# Patient Record
Sex: FEMALE | Race: WHITE | NOT HISPANIC OR LATINO | Employment: FULL TIME | URBAN - METROPOLITAN AREA
[De-identification: names, ages, dates, MRNs, and addresses within clinical notes are randomized per-mention and may not be internally consistent; named-entity substitution may affect disease eponyms.]

---

## 2017-03-07 ENCOUNTER — OFFICE VISIT (OUTPATIENT)
Dept: INTERNAL MEDICINE | Age: 57
End: 2017-03-07

## 2017-03-07 VITALS
BODY MASS INDEX: 30.66 KG/M2 | TEMPERATURE: 97.4 F | SYSTOLIC BLOOD PRESSURE: 130 MMHG | RESPIRATION RATE: 12 BRPM | HEIGHT: 65 IN | DIASTOLIC BLOOD PRESSURE: 80 MMHG | WEIGHT: 184 LBS | HEART RATE: 72 BPM

## 2017-03-07 DIAGNOSIS — K52.9 GASTROENTERITIS, ACUTE: Primary | ICD-10-CM

## 2017-03-07 PROCEDURE — 99214 OFFICE O/P EST MOD 30 MIN: CPT | Performed by: INTERNAL MEDICINE

## 2017-03-07 RX ORDER — PROMETHAZINE HYDROCHLORIDE 12.5 MG/1
TABLET ORAL
Qty: 20 TABLET | Refills: 0 | Status: SHIPPED | OUTPATIENT
Start: 2017-03-07 | End: 2018-07-03 | Stop reason: SDUPTHER

## 2017-03-07 NOTE — PROGRESS NOTES
Carlie Brennan is a 56 y.o. female who presents with   Chief Complaint   Patient presents with   • Nausea     Nausea/vomiting off and on for the past week.  No fever or chills.  No diarrhea.   • Vomiting     History as above.   .    Nausea   This is a new problem. The current episode started in the past 7 days. The problem occurs intermittently. The problem has been unchanged. Associated symptoms include fatigue, nausea, vomiting and weakness. Pertinent negatives include no abdominal pain, anorexia, change in bowel habit, chills, congestion, coughing, fever or urinary symptoms. She has tried nothing for the symptoms.   Vomiting    This is a new problem. The current episode started in the past 7 days. The problem occurs intermittently. The problem has been unchanged. Pertinent negatives include no abdominal pain, chills, coughing or fever. She has tried nothing for the symptoms.        The following portions of the patient's history were reviewed and updated as appropriate: allergies, current medications, past medical history and problem list.    Review of Systems   Constitutional: Positive for fatigue. Negative for chills and fever.   HENT: Negative.  Negative for congestion.    Eyes: Negative.    Respiratory: Negative.  Negative for cough.    Cardiovascular: Negative.    Gastrointestinal: Positive for nausea and vomiting. Negative for abdominal pain, anorexia and change in bowel habit.   Genitourinary: Negative.    Musculoskeletal: Negative.    Skin: Negative.    Neurological: Positive for weakness.   Psychiatric/Behavioral: Negative.        Objective   Physical Exam   Constitutional: She is oriented to person, place, and time. She appears well-developed and well-nourished. No distress.   HENT:   Head: Normocephalic and atraumatic.   Eyes: Conjunctivae and EOM are normal. Pupils are equal, round, and reactive to light.   Neck: Normal range of motion. Neck supple. No thyromegaly present.   Neck exam negative.   Carotid auscultation normal-no bruits heard.   Cardiovascular: Normal rate, regular rhythm, normal heart sounds and intact distal pulses.  Exam reveals no gallop and no friction rub.    No murmur heard.  Pulmonary/Chest: Effort normal and breath sounds normal. No respiratory distress. She has no wheezes. She has no rales. She exhibits no tenderness.   Abdominal: Soft. Bowel sounds are normal. She exhibits no distension and no mass. There is no tenderness. There is no rebound and no guarding.   Neurological: She is alert and oriented to person, place, and time.   Psychiatric: She has a normal mood and affect. Her behavior is normal. Judgment and thought content normal.   Nursing note and vitals reviewed.      Assessment/Plan   Carlie was seen today for nausea and vomiting.    Diagnoses and all orders for this visit:    Gastroenteritis, acute  -     promethazine (PHENERGAN) 12.5 MG tablet; Take one tablet every 4 to 6 hours as needed for nausea or vomiting.        Plan: Clear liquids to tolerance.  Phenergan tablets as above.  Follow-up for this issue as needed.  Patient was given a note to remain off for the rest of today.

## 2017-06-14 ENCOUNTER — OFFICE VISIT (OUTPATIENT)
Dept: INTERNAL MEDICINE | Age: 57
End: 2017-06-14

## 2017-06-14 VITALS
BODY MASS INDEX: 28.59 KG/M2 | RESPIRATION RATE: 12 BRPM | SYSTOLIC BLOOD PRESSURE: 120 MMHG | DIASTOLIC BLOOD PRESSURE: 70 MMHG | WEIGHT: 171.6 LBS | HEART RATE: 70 BPM | HEIGHT: 65 IN | OXYGEN SATURATION: 98 %

## 2017-06-14 DIAGNOSIS — I10 ESSENTIAL HYPERTENSION: Primary | ICD-10-CM

## 2017-06-14 DIAGNOSIS — E78.2 MIXED HYPERLIPIDEMIA: ICD-10-CM

## 2017-06-14 LAB
ALBUMIN SERPL-MCNC: 4.9 G/DL (ref 3.5–5.2)
ALBUMIN/GLOB SERPL: 1.9 G/DL
ALP SERPL-CCNC: 76 U/L (ref 39–117)
ALT SERPL-CCNC: 19 U/L (ref 1–33)
AST SERPL-CCNC: 24 U/L (ref 1–32)
BILIRUB SERPL-MCNC: 0.4 MG/DL (ref 0.1–1.2)
BUN SERPL-MCNC: 12 MG/DL (ref 6–20)
BUN/CREAT SERPL: 10.8 (ref 7–25)
CALCIUM SERPL-MCNC: 10 MG/DL (ref 8.6–10.5)
CHLORIDE SERPL-SCNC: 99 MMOL/L (ref 98–107)
CHOLEST SERPL-MCNC: 200 MG/DL (ref 0–200)
CO2 SERPL-SCNC: 26.7 MMOL/L (ref 22–29)
CREAT SERPL-MCNC: 1.11 MG/DL (ref 0.57–1)
GLOBULIN SER CALC-MCNC: 2.6 GM/DL
GLUCOSE SERPL-MCNC: 93 MG/DL (ref 65–99)
HDLC SERPL-MCNC: 43 MG/DL (ref 40–60)
LDLC SERPL CALC-MCNC: 128 MG/DL (ref 0–100)
POTASSIUM SERPL-SCNC: 5.1 MMOL/L (ref 3.5–5.2)
PROT SERPL-MCNC: 7.5 G/DL (ref 6–8.5)
SODIUM SERPL-SCNC: 142 MMOL/L (ref 136–145)
TRIGL SERPL-MCNC: 144 MG/DL (ref 0–150)
VLDLC SERPL CALC-MCNC: 28.8 MG/DL (ref 5–40)

## 2017-06-14 PROCEDURE — 99214 OFFICE O/P EST MOD 30 MIN: CPT | Performed by: INTERNAL MEDICINE

## 2017-06-14 NOTE — PROGRESS NOTES
Carlie Brennan is a 56 y.o. female who presents with   Chief Complaint   Patient presents with   • Hypertension     Yearly checkup; lab updates   • Hyperlipidemia     As above.   .    Hypertension   This is a chronic problem. The current episode started more than 1 year ago. The problem is controlled. Treatments tried: Currently taking spironolactone as directed. The current treatment provides moderate improvement. There are no compliance problems.    Hyperlipidemia   This is a chronic problem. The current episode started more than 1 year ago. The problem is controlled. Recent lipid tests were reviewed and are normal. She is currently on no antihyperlipidemic treatment.        The following portions of the patient's history were reviewed and updated as appropriate: allergies, current medications, past medical history and problem list.    Review of Systems   Constitutional: Negative.    HENT: Negative.    Eyes: Negative.    Respiratory: Negative.    Cardiovascular: Negative.    Genitourinary: Negative.    Musculoskeletal: Negative.    Skin: Negative.    Neurological: Negative.    Psychiatric/Behavioral: Negative.        Objective   Physical Exam   Constitutional: She is oriented to person, place, and time. She appears well-developed and well-nourished. No distress.   HENT:   Head: Normocephalic and atraumatic.   Eyes: Conjunctivae and EOM are normal. Pupils are equal, round, and reactive to light.   Neck: Normal range of motion. Neck supple. No thyromegaly present.   Neck exam negative.  Carotid auscultation normal-no bruits heard.   Cardiovascular: Normal rate, regular rhythm, normal heart sounds and intact distal pulses.  Exam reveals no gallop and no friction rub.    No murmur heard.  Pulmonary/Chest: Effort normal and breath sounds normal. No respiratory distress. She has no wheezes. She has no rales. She exhibits no tenderness.   Neurological: She is alert and oriented to person, place, and time.   Psychiatric:  She has a normal mood and affect. Her behavior is normal. Judgment and thought content normal.   Nursing note and vitals reviewed.      Assessment/Plan   Carlie was seen today for hypertension and hyperlipidemia.    Diagnoses and all orders for this visit:    Essential hypertension  -     Comprehensive Metabolic Panel    Mixed hyperlipidemia  -     Comprehensive Metabolic Panel  -     Lipid Panel      Plan: Labs as above.Today's exam unremarkable for any new problems or events.  Continue all current treatment as prescribed.  A follow-up checkup/lab update visit is advised for one year.

## 2017-07-25 ENCOUNTER — TELEPHONE (OUTPATIENT)
Dept: INTERNAL MEDICINE | Age: 57
End: 2017-07-25

## 2017-07-25 DIAGNOSIS — Z00.00 HEALTHCARE MAINTENANCE: Primary | ICD-10-CM

## 2017-07-25 NOTE — TELEPHONE ENCOUNTER
P/pt it has been discussed in the past with  about getting a referral for a colonoscopy.    Pt is requesting a referral for a colonoscopy.    Pt can be reached #[phone].

## 2017-09-21 ENCOUNTER — TELEPHONE (OUTPATIENT)
Dept: INTERNAL MEDICINE | Age: 57
End: 2017-09-21

## 2017-09-26 ENCOUNTER — APPOINTMENT (OUTPATIENT)
Dept: WOMENS IMAGING | Facility: HOSPITAL | Age: 57
End: 2017-09-26

## 2017-09-26 PROCEDURE — 77063 BREAST TOMOSYNTHESIS BI: CPT | Performed by: RADIOLOGY

## 2017-09-26 PROCEDURE — 77067 SCR MAMMO BI INCL CAD: CPT | Performed by: RADIOLOGY

## 2018-02-02 ENCOUNTER — OFFICE VISIT (OUTPATIENT)
Dept: INTERNAL MEDICINE | Age: 58
End: 2018-02-02

## 2018-02-02 VITALS
BODY MASS INDEX: 26.66 KG/M2 | OXYGEN SATURATION: 98 % | HEIGHT: 65 IN | RESPIRATION RATE: 12 BRPM | DIASTOLIC BLOOD PRESSURE: 60 MMHG | SYSTOLIC BLOOD PRESSURE: 120 MMHG | WEIGHT: 160 LBS | TEMPERATURE: 97.3 F | HEART RATE: 80 BPM

## 2018-02-02 DIAGNOSIS — H81.13 BENIGN PAROXYSMAL POSITIONAL VERTIGO DUE TO BILATERAL VESTIBULAR DISORDER: Primary | ICD-10-CM

## 2018-02-02 DIAGNOSIS — I10 ESSENTIAL HYPERTENSION: ICD-10-CM

## 2018-02-02 PROCEDURE — 99214 OFFICE O/P EST MOD 30 MIN: CPT | Performed by: INTERNAL MEDICINE

## 2018-02-02 RX ORDER — SPIRONOLACTONE 50 MG/1
TABLET, FILM COATED ORAL
Refills: 0 | COMMUNITY
Start: 2018-01-30 | End: 2018-07-03 | Stop reason: SDUPTHER

## 2018-02-02 NOTE — PROGRESS NOTES
"  Carlie Brennan is a 57 y.o. female who presents with   Chief Complaint   Patient presents with   • Dizziness     started this morning.  Patient states that she is having problems with more and more dizziness.  In the past she used to have it \"a couple of times a year\" and now she is having it \"every few months\".  It is characterized by sudden onset of dizziness with exacerbation by positional change.  She said the more she is up the worse it gets and it can be associated with nausea.  There are no symptoms of paresthesias or cardiac irregularities.   .    Dizziness   This is a recurrent problem. The current episode started more than 1 year ago. The problem occurs constantly. The problem has been waxing and waning. Associated symptoms include nausea, vertigo and vomiting. Pertinent negatives include no chills, congestion, coughing, diaphoresis, fever, headaches, numbness, rash, sore throat, visual change or weakness. Treatments tried: Meclizine; Antivert; Dramamine. Improvement on treatment: She does get mild relief with these medications but nothing that seems to make the problem go away.        The following portions of the patient's history were reviewed and updated as appropriate: allergies, current medications, past medical history and problem list.    Review of Systems   Constitutional: Negative for chills, diaphoresis and fever.   HENT: Negative for congestion and sore throat.    Respiratory: Negative for cough.    Gastrointestinal: Positive for nausea and vomiting.   Skin: Negative for rash.   Neurological: Positive for dizziness and vertigo. Negative for weakness, numbness and headaches.       Objective   Physical Exam   Constitutional: She is oriented to person, place, and time. She appears well-developed and well-nourished. No distress.   HENT:   Head: Normocephalic and atraumatic.   Right Ear: External ear normal.   Left Ear: External ear normal.   Nose: Nose normal.   Mouth/Throat: Oropharynx is clear " and moist. No oropharyngeal exudate.   Eyes: Conjunctivae and EOM are normal. Pupils are equal, round, and reactive to light.   Neck: Normal range of motion. Neck supple. No thyromegaly present.   Neck exam negative.  Carotid auscultation normal-no bruits heard.   Cardiovascular: Normal rate, regular rhythm, normal heart sounds and intact distal pulses.  Exam reveals no gallop and no friction rub.    No murmur heard.  Pulmonary/Chest: Effort normal and breath sounds normal. No respiratory distress. She has no wheezes. She has no rales. She exhibits no tenderness.   Neurological: She is alert and oriented to person, place, and time.   Psychiatric: She has a normal mood and affect. Her behavior is normal. Judgment and thought content normal.   Nursing note and vitals reviewed.      Assessment/Plan   Carlie was seen today for dizziness.    Diagnoses and all orders for this visit:    Benign paroxysmal positional vertigo due to bilateral vestibular disorder  -     Ambulatory Referral to ENT (Otolaryngology)    Essential hypertension        Plan: From the history it sounds as if she is having benign paroxysmal positional vertigo.  Her blood pressure is acceptable and she will continue all currently prescribed medications as they are.  We will make an ENT referral to Dr. Kishore Davis for further evaluation.  Neurology evaluation suggested if ENT referral does not come up with a reason for her problem.  Labs from June reviewed and all were acceptable.  Yearly follow-up from then has been advised..

## 2018-02-12 ENCOUNTER — PREP FOR SURGERY (OUTPATIENT)
Dept: OTHER | Facility: HOSPITAL | Age: 58
End: 2018-02-12

## 2018-02-12 DIAGNOSIS — Z12.11 SCREEN FOR COLON CANCER: Primary | ICD-10-CM

## 2018-02-13 PROBLEM — Z12.11 SCREEN FOR COLON CANCER: Status: ACTIVE | Noted: 2018-02-13

## 2018-03-07 ENCOUNTER — TELEPHONE (OUTPATIENT)
Dept: INTERNAL MEDICINE | Age: 58
End: 2018-03-07

## 2018-03-07 RX ORDER — SCOLOPAMINE TRANSDERMAL SYSTEM 1 MG/1
PATCH, EXTENDED RELEASE TRANSDERMAL
Qty: 1 PATCH | Refills: 0 | Status: SHIPPED | OUTPATIENT
Start: 2018-03-07 | End: 2018-06-18

## 2018-03-07 NOTE — TELEPHONE ENCOUNTER
Pt called in and stated that she is having a Colonoscopy March 12th, pt stated that she gets sick off the anesthesia and wants to know if Dr Lancaster will call her in the patch that you place behind the ear.  Pt Pharm [facility] on [address].  PT # [phone]

## 2018-03-12 ENCOUNTER — ANESTHESIA EVENT (OUTPATIENT)
Dept: GASTROENTEROLOGY | Facility: HOSPITAL | Age: 58
End: 2018-03-12

## 2018-03-12 ENCOUNTER — HOSPITAL ENCOUNTER (OUTPATIENT)
Facility: HOSPITAL | Age: 58
Setting detail: HOSPITAL OUTPATIENT SURGERY
Discharge: HOME | End: 2018-03-12
Attending: INTERNAL MEDICINE | Admitting: INTERNAL MEDICINE

## 2018-03-12 ENCOUNTER — ANESTHESIA (OUTPATIENT)
Dept: GASTROENTEROLOGY | Facility: HOSPITAL | Age: 58
End: 2018-03-12

## 2018-03-12 VITALS
WEIGHT: 159.5 LBS | BODY MASS INDEX: 26.57 KG/M2 | HEIGHT: 65 IN | HEART RATE: 62 BPM | OXYGEN SATURATION: 98 % | TEMPERATURE: 97.8 F | SYSTOLIC BLOOD PRESSURE: 126 MMHG | DIASTOLIC BLOOD PRESSURE: 80 MMHG | RESPIRATION RATE: 16 BRPM

## 2018-03-12 DIAGNOSIS — Z12.11 SCREEN FOR COLON CANCER: ICD-10-CM

## 2018-03-12 PROCEDURE — 88305 TISSUE EXAM BY PATHOLOGIST: CPT | Performed by: INTERNAL MEDICINE

## 2018-03-12 PROCEDURE — S0260 H&P FOR SURGERY: HCPCS | Performed by: INTERNAL MEDICINE

## 2018-03-12 PROCEDURE — 45385 COLONOSCOPY W/LESION REMOVAL: CPT | Performed by: INTERNAL MEDICINE

## 2018-03-12 PROCEDURE — 25010000002 PROPOFOL 10 MG/ML EMULSION: Performed by: NURSE ANESTHETIST, CERTIFIED REGISTERED

## 2018-03-12 RX ORDER — SODIUM CHLORIDE, SODIUM LACTATE, POTASSIUM CHLORIDE, CALCIUM CHLORIDE 600; 310; 30; 20 MG/100ML; MG/100ML; MG/100ML; MG/100ML
1000 INJECTION, SOLUTION INTRAVENOUS CONTINUOUS PRN
Status: DISCONTINUED | OUTPATIENT
Start: 2018-03-12 | End: 2018-03-12 | Stop reason: HOSPADM

## 2018-03-12 RX ORDER — PROPOFOL 10 MG/ML
VIAL (ML) INTRAVENOUS CONTINUOUS PRN
Status: DISCONTINUED | OUTPATIENT
Start: 2018-03-12 | End: 2018-03-12 | Stop reason: SURG

## 2018-03-12 RX ADMIN — SODIUM CHLORIDE, POTASSIUM CHLORIDE, SODIUM LACTATE AND CALCIUM CHLORIDE 1000 ML: 600; 310; 30; 20 INJECTION, SOLUTION INTRAVENOUS at 10:10

## 2018-03-12 RX ADMIN — PROPOFOL 160 MCG/KG/MIN: 10 INJECTION, EMULSION INTRAVENOUS at 10:49

## 2018-03-12 NOTE — H&P
"[facility]  Pre Procedure History & Physical    Chief Complaint:   Colonoscopy screening    Subjective     HPI:   Patient 57-year-old female with history of hypertension, rosacea, vertigo and GERD presenting for screening.  Patient reports no change in bowel habits no melena or bright red blood per rectum.  Patient here for colonoscopy screening.    Past Medical History:   Past Medical History:   Diagnosis Date   • GERD (gastroesophageal reflux disease)    • Hypertension    • Rosacea    • Vertigo        Past Surgical History:  Past Surgical History:   Procedure Laterality Date   • ABDOMINAL SURGERY     •  SECTION     • COLONOSCOPY  ?    Normal.  Patient says she had this done when she had EGD for heartburn       Family History:  Family History   Problem Relation Age of Onset   • No Known Problems Mother        Social History:   reports that she has never smoked. She has never used smokeless tobacco. She reports that she does not drink alcohol.    Medications:   Prescriptions Prior to Admission   Medication Sig Dispense Refill Last Dose   • omeprazole (PriLOSEC) 20 MG capsule Take 20 mg by mouth daily as needed.   Past Month at Unknown time   • Scopolamine (TRANSDERM-SCOP) 1.5 MG/3DAYS patch PLACE 1 PATCH BEHIND THE EAR AFTER SURGERY 1 patch 0 3/12/2018 at Unknown time   • promethazine (PHENERGAN) 12.5 MG tablet Take one tablet every 4 to 6 hours as needed for nausea or vomiting. 20 tablet 0 More than a month at Unknown time   • spironolactone (ALDACTONE) 50 MG tablet TK 1  PO D  0 3/9/2018       Allergies:  Review of patient's allergies indicates no known allergies.    ROS:    Pertinent items are noted in HPI     Objective     Blood pressure 124/76, pulse 62, temperature 98.6 °F (37 °C), temperature source Oral, resp. rate 12, height 165.1 cm (65\"), weight 72.3 kg (159 lb 8 oz), SpO2 99 %.    Physical Exam   Constitutional: Pt is oriented to person, place, and time and " well-developed, well-nourished, and in no distress.   Mouth/Throat: Oropharynx is clear and moist.   Neck: Normal range of motion.   Cardiovascular: Normal rate, regular rhythm and normal heart sounds.    Pulmonary/Chest: Effort normal and breath sounds normal.   Abdominal: Soft. Nontender  Skin: Skin is warm and dry.   Psychiatric: Mood, memory, affect and judgment normal.     Assessment/Plan     Diagnosis:  Colonoscopy screening    Anticipated Surgical Procedure:  Colonoscopy    The risks, benefits, and alternatives of this procedure have been discussed with the patient or the responsible party- the patient understands and agrees to proceed.

## 2018-03-12 NOTE — ANESTHESIA PREPROCEDURE EVALUATION
Anesthesia Evaluation     Patient summary reviewed and Nursing notes reviewed   NPO Solid Status: > 8 hours  NPO Liquid Status: < 2 hours           Airway   Mallampati: II  TM distance: >3 FB  Neck ROM: full  Dental - normal exam     Pulmonary - negative pulmonary ROS and normal exam    breath sounds clear to auscultation  Cardiovascular - normal exam    Rhythm: regular  Rate: normal    (+) hypertension, valvular problems/murmurs MVP,   (-) angina, orthopnea, PND, COLON      Neuro/Psych  (+) dizziness/light headedness,     GI/Hepatic/Renal/Endo    (+)  GERD,      Musculoskeletal (-) negative ROS    Abdominal    Substance History - negative use     OB/GYN negative ob/gyn ROS         Other - negative ROS                       Anesthesia Plan    ASA 2     MAC     Anesthetic plan and risks discussed with patient.

## 2018-03-12 NOTE — BRIEF OP NOTE
COLONOSCOPY  Progress Note    Carlie Brennan  3/12/2018    Pre-op Diagnosis:   Screen for colon cancer [Z12.11]       Post-Op Diagnosis Codes:     * Colon polyp [K63.5]    Procedure/CPT® Codes:      Procedure(s):  COLONOSCOPY INTO CECUM AND NORMAL TI WITH HOT SNARE POLYPECTOMY    Surgeon(s):  Lincoln Chino MD    Anesthesia: Monitor Anesthesia Care    Staff:   Endo Nurse: Do Fraire RN; Milena Thomas RN    Estimated Blood Loss: none    Urine Voided: * No values recorded between 3/12/2018 10:48 AM and 3/12/2018 11:10 AM *    Specimens:                ID Type Source Tests Collected by Time   A : SIGMOID COLON POLYP  Polyp Large Intestine, Sigmoid Colon TISSUE PATHOLOGY EXAM Lincoln Chino MD 3/12/2018 1107         Drains:      Findings: Sigmoid polyp removed hot snare    Complications: None      Lincoln Chino MD     Date: 3/12/2018  Time: 11:18 AM

## 2018-03-13 LAB
CYTO UR: NORMAL
LAB AP CASE REPORT: NORMAL
Lab: NORMAL
PATH REPORT.FINAL DX SPEC: NORMAL
PATH REPORT.GROSS SPEC: NORMAL

## 2018-04-09 ENCOUNTER — TELEPHONE (OUTPATIENT)
Dept: GASTROENTEROLOGY | Facility: CLINIC | Age: 58
End: 2018-04-09

## 2018-04-09 NOTE — TELEPHONE ENCOUNTER
Patient called advised as per Dr. Chino's note her polyp was a benign adenoma and will need to repeat her colonoscopy in 5 years. She verb understanding and is agreeable to the plan. Patient's health maintenance record updated to reflect the need to repeat colonoscopy in 5 years.

## 2018-04-09 NOTE — TELEPHONE ENCOUNTER
----- Message from Lincoln Chino MD sent at 4/9/2018 10:30 AM EDT -----  Polyp was benign adenoma.  Repeat colonoscopy 5 years as discussed

## 2018-06-18 ENCOUNTER — OFFICE VISIT (OUTPATIENT)
Dept: INTERNAL MEDICINE | Age: 58
End: 2018-06-18

## 2018-06-18 VITALS
SYSTOLIC BLOOD PRESSURE: 120 MMHG | WEIGHT: 160 LBS | DIASTOLIC BLOOD PRESSURE: 70 MMHG | OXYGEN SATURATION: 98 % | BODY MASS INDEX: 26.66 KG/M2 | TEMPERATURE: 97.5 F | HEART RATE: 72 BPM | HEIGHT: 65 IN | RESPIRATION RATE: 13 BRPM

## 2018-06-18 DIAGNOSIS — E78.2 MIXED HYPERLIPIDEMIA: ICD-10-CM

## 2018-06-18 DIAGNOSIS — I10 ESSENTIAL HYPERTENSION: Primary | ICD-10-CM

## 2018-06-18 DIAGNOSIS — T75.3XXA MOTION SICKNESS, INITIAL ENCOUNTER: ICD-10-CM

## 2018-06-18 LAB
ALBUMIN SERPL-MCNC: 4.6 G/DL (ref 3.5–5.2)
ALBUMIN/GLOB SERPL: 1.6 G/DL
ALP SERPL-CCNC: 81 U/L (ref 39–117)
ALT SERPL-CCNC: 22 U/L (ref 1–33)
AST SERPL-CCNC: 22 U/L (ref 1–32)
BILIRUB SERPL-MCNC: 0.3 MG/DL (ref 0.1–1.2)
BUN SERPL-MCNC: 14 MG/DL (ref 6–20)
BUN/CREAT SERPL: 12.8 (ref 7–25)
CALCIUM SERPL-MCNC: 9.8 MG/DL (ref 8.6–10.5)
CHLORIDE SERPL-SCNC: 104 MMOL/L (ref 98–107)
CHOLEST SERPL-MCNC: 188 MG/DL (ref 0–200)
CO2 SERPL-SCNC: 26.8 MMOL/L (ref 22–29)
CREAT SERPL-MCNC: 1.09 MG/DL (ref 0.57–1)
GFR SERPLBLD CREATININE-BSD FMLA CKD-EPI: 52 ML/MIN/1.73
GFR SERPLBLD CREATININE-BSD FMLA CKD-EPI: 63 ML/MIN/1.73
GLOBULIN SER CALC-MCNC: 2.9 GM/DL
GLUCOSE SERPL-MCNC: 101 MG/DL (ref 65–99)
HDLC SERPL-MCNC: 48 MG/DL (ref 40–60)
LDLC SERPL CALC-MCNC: 113 MG/DL (ref 0–100)
POTASSIUM SERPL-SCNC: 5.1 MMOL/L (ref 3.5–5.2)
PROT SERPL-MCNC: 7.5 G/DL (ref 6–8.5)
SODIUM SERPL-SCNC: 144 MMOL/L (ref 136–145)
TRIGL SERPL-MCNC: 137 MG/DL (ref 0–150)
VLDLC SERPL CALC-MCNC: 27.4 MG/DL (ref 5–40)

## 2018-06-18 PROCEDURE — 99214 OFFICE O/P EST MOD 30 MIN: CPT | Performed by: INTERNAL MEDICINE

## 2018-06-18 RX ORDER — SCOLOPAMINE TRANSDERMAL SYSTEM 1 MG/1
1 PATCH, EXTENDED RELEASE TRANSDERMAL
Qty: 4 PATCH | Refills: 0 | Status: SHIPPED | OUTPATIENT
Start: 2018-06-18 | End: 2018-06-18 | Stop reason: SDUPTHER

## 2018-06-18 RX ORDER — SCOLOPAMINE TRANSDERMAL SYSTEM 1 MG/1
1 PATCH, EXTENDED RELEASE TRANSDERMAL
Qty: 4 PATCH | Refills: 0 | Status: SHIPPED | OUTPATIENT
Start: 2018-06-18 | End: 2018-08-23

## 2018-06-18 NOTE — PROGRESS NOTES
Carlie Brennan is a 57 y.o. female who presents with   Chief Complaint   Patient presents with   • Hypertension   • Hyperlipidemia   .    57-year-old female presents for her yearly checkup/lab update visit.  No complaints on today's visit.  She is going on a trip to [facility] in the near future.  She will be gone 9 days and would like a refill on her Transderm-Scop patches.      Hypertension   This is a chronic problem. The current episode started more than 1 year ago. The problem is controlled. Treatments tried: Spironolactone as directed. The current treatment provides moderate improvement. There are no compliance problems.    Hyperlipidemia   This is a chronic problem. The current episode started more than 1 year ago. The problem is controlled. She is currently on no antihyperlipidemic treatment.        The following portions of the patient's history were reviewed and updated as appropriate: allergies, current medications, past medical history and problem list.    Review of Systems   Constitutional: Negative.    HENT: Negative.    Eyes: Negative.    Respiratory: Negative.    Cardiovascular: Negative.    Genitourinary: Negative.    Musculoskeletal: Negative.    Skin: Negative.    Neurological: Negative.    Psychiatric/Behavioral: Negative.        Objective   Physical Exam   Constitutional: She is oriented to person, place, and time. She appears well-developed and well-nourished. No distress.   HENT:   Head: Normocephalic and atraumatic.   Eyes: Conjunctivae and EOM are normal. Pupils are equal, round, and reactive to light.   Neck: Normal range of motion. Neck supple. No thyromegaly present.   Neck exam negative.  Carotid auscultation normal-no bruits heard.   Cardiovascular: Normal rate, regular rhythm, normal heart sounds and intact distal pulses.  Exam reveals no gallop and no friction rub.    No murmur heard.  Pulmonary/Chest: Effort normal and breath sounds normal. No respiratory distress. She has no wheezes.  She has no rales. She exhibits no tenderness.   Neurological: She is alert and oriented to person, place, and time.   Psychiatric: She has a normal mood and affect. Her behavior is normal. Judgment and thought content normal.   Nursing note and vitals reviewed.      Assessment/Plan   Carlie was seen today for hypertension and hyperlipidemia.    Diagnoses and all orders for this visit:    Essential hypertension  -     Comprehensive Metabolic Panel    Mixed hyperlipidemia  -     Comprehensive Metabolic Panel  -     Lipid Panel    Motion sickness, initial encounter        Plan: Labs as above for the issues as outlined.  Refill Transderm Scop-#5 patches .  Apply behind the ear 24 hours prior to leaving on her trip.  Change every third day to alternate year.  Leave on for 24 hours after returning.    Tentative plans for a one-year follow-up checkup/lab update visit.  Continue all treatment as prescribed.

## 2018-06-19 NOTE — PROGRESS NOTES
All labs are within normal / acceptable ranges. Continue all current meds as they are. A followup visit to be seen and have labs updated in one year is advised.

## 2018-07-02 RX ORDER — SPIRONOLACTONE 50 MG/1
100 TABLET, FILM COATED ORAL DAILY
Qty: 60 TABLET | Refills: 7 | Status: CANCELLED | OUTPATIENT
Start: 2018-06-18

## 2018-07-03 ENCOUNTER — OFFICE VISIT (OUTPATIENT)
Dept: INTERNAL MEDICINE | Age: 58
End: 2018-07-03

## 2018-07-03 VITALS
DIASTOLIC BLOOD PRESSURE: 68 MMHG | TEMPERATURE: 97.6 F | BODY MASS INDEX: 27.29 KG/M2 | OXYGEN SATURATION: 98 % | RESPIRATION RATE: 13 BRPM | HEIGHT: 65 IN | SYSTOLIC BLOOD PRESSURE: 120 MMHG | WEIGHT: 163.8 LBS | HEART RATE: 73 BPM

## 2018-07-03 DIAGNOSIS — K52.9 GASTROENTERITIS, ACUTE: ICD-10-CM

## 2018-07-03 DIAGNOSIS — R11.0 NAUSEA: ICD-10-CM

## 2018-07-03 DIAGNOSIS — R42 DIZZINESS: Primary | ICD-10-CM

## 2018-07-03 PROCEDURE — 99214 OFFICE O/P EST MOD 30 MIN: CPT | Performed by: INTERNAL MEDICINE

## 2018-07-03 RX ORDER — MECLIZINE HYDROCHLORIDE 25 MG/1
25 TABLET ORAL 3 TIMES DAILY PRN
Qty: 15 TABLET | Refills: 1 | Status: SHIPPED | OUTPATIENT
Start: 2018-07-03 | End: 2019-09-13 | Stop reason: SDUPTHER

## 2018-07-03 RX ORDER — PROMETHAZINE HYDROCHLORIDE 12.5 MG/1
TABLET ORAL
Qty: 20 TABLET | Refills: 1 | Status: SHIPPED | OUTPATIENT
Start: 2018-07-03 | End: 2018-08-23

## 2018-07-03 NOTE — PROGRESS NOTES
Carlie Brennan is a 58 y.o. female who presents with   Chief Complaint   Patient presents with   • Dizziness   • Nausea   .    58-year-old female who just returned from a trip to [facility].  While on her trip she used Transderm-Scop patches to combat motion sickness.  She also used a patch for 24 hours after returning from her trip to wean herself away from from the patches that she used while on her trip.  She presents today complaining of vague symptoms of dry nose, dizziness, nausea, and generalized weakness.  She has had no fever or chills and no cough or shortness of breath.      Dizziness   This is a new problem. The current episode started in the past 7 days. The problem occurs constantly. The problem has been unchanged. Associated symptoms include fatigue, nausea and vertigo. Pertinent negatives include no abdominal pain, chills, congestion, coughing, diaphoresis, fever, headaches, sore throat or vomiting. Nothing aggravates the symptoms. She has tried nothing for the symptoms.   Nausea   This is a new problem. The current episode started in the past 7 days. The problem occurs intermittently. The problem has been unchanged. Associated symptoms include fatigue, nausea and vertigo. Pertinent negatives include no abdominal pain, chills, congestion, coughing, diaphoresis, fever, headaches, sore throat or vomiting. Nothing aggravates the symptoms. She has tried nothing for the symptoms.        The following portions of the patient's history were reviewed and updated as appropriate: allergies, current medications, past medical history and problem list.    Review of Systems   Constitutional: Positive for fatigue. Negative for chills, diaphoresis and fever.   HENT: Negative.  Negative for congestion, ear pain, postnasal drip, rhinorrhea, sinus pain, sinus pressure and sore throat.    Eyes: Negative.    Respiratory: Negative.  Negative for cough.    Cardiovascular: Negative.    Gastrointestinal: Positive for nausea.  Negative for abdominal pain and vomiting.   Genitourinary: Negative.    Musculoskeletal: Negative.    Skin: Negative.    Neurological: Positive for dizziness and vertigo. Negative for headaches.   Psychiatric/Behavioral: Negative.        Objective   Physical Exam   Constitutional: She is oriented to person, place, and time. She appears well-developed and well-nourished. No distress.   HENT:   Head: Normocephalic and atraumatic.   Right Ear: External ear normal.   Left Ear: External ear normal.   Nose: Nose normal.   Mouth/Throat: Oropharynx is clear and moist. No oropharyngeal exudate.   Eyes: Conjunctivae and EOM are normal. Pupils are equal, round, and reactive to light.   Neck: Normal range of motion. Neck supple. No thyromegaly present.   Neck exam negative.  Carotid auscultation normal-no bruits heard.   Cardiovascular: Normal rate, regular rhythm, normal heart sounds and intact distal pulses.  Exam reveals no gallop and no friction rub.    No murmur heard.  Pulmonary/Chest: Effort normal and breath sounds normal. No respiratory distress. She has no wheezes. She has no rales. She exhibits no tenderness.   Neurological: She is alert and oriented to person, place, and time.   Psychiatric: She has a normal mood and affect. Her behavior is normal. Judgment and thought content normal.   Nursing note and vitals reviewed.      Assessment/Plan   Carlie was seen today for dizziness and nausea.    Diagnoses and all orders for this visit:    Dizziness    Nausea    Gastroenteritis, acute  -     promethazine (PHENERGAN) 12.5 MG tablet; Take one tablet every 4 to 6 hours as needed for nausea or vomiting.    Other orders  -     meclizine (ANTIVERT) 25 MG tablet; Take 1 tablet by mouth 3 (Three) Times a Day As Needed for dizziness.      Plan: Phenergan/meclizine as above for the issues as outlined but in general she has very vague and nebulous symptoms.  I suspect there may be a side effect from the Transderm-Scop that she  used but she said she has had similar symptoms in the past when not using the patches.    ENT evaluation advised if problems persist, recur, or failed to respond to current treatment.

## 2018-08-23 ENCOUNTER — OFFICE VISIT (OUTPATIENT)
Dept: INTERNAL MEDICINE | Facility: CLINIC | Age: 58
End: 2018-08-23

## 2018-08-23 VITALS
SYSTOLIC BLOOD PRESSURE: 118 MMHG | HEIGHT: 65 IN | DIASTOLIC BLOOD PRESSURE: 74 MMHG | WEIGHT: 165 LBS | BODY MASS INDEX: 27.49 KG/M2 | RESPIRATION RATE: 14 BRPM

## 2018-08-23 DIAGNOSIS — Z00.00 HEALTH CARE MAINTENANCE: Primary | ICD-10-CM

## 2018-08-23 DIAGNOSIS — H81.03 MENIERE'S DISEASE OF BOTH EARS: ICD-10-CM

## 2018-08-23 DIAGNOSIS — I10 ESSENTIAL HYPERTENSION: ICD-10-CM

## 2018-08-23 DIAGNOSIS — Z78.0 POST-MENOPAUSE: ICD-10-CM

## 2018-08-23 PROBLEM — Z86.0101 HISTORY OF ADENOMATOUS POLYP OF COLON: Status: ACTIVE | Noted: 2018-08-23

## 2018-08-23 PROBLEM — Z86.010 HISTORY OF ADENOMATOUS POLYP OF COLON: Status: ACTIVE | Noted: 2018-08-23

## 2018-08-23 PROBLEM — D50.9 IRON DEFICIENCY ANEMIA: Status: ACTIVE | Noted: 2018-08-23

## 2018-08-23 PROCEDURE — 99396 PREV VISIT EST AGE 40-64: CPT | Performed by: INTERNAL MEDICINE

## 2018-08-23 RX ORDER — TRIAMTERENE AND HYDROCHLOROTHIAZIDE 37.5; 25 MG/1; MG/1
1 TABLET ORAL DAILY
Qty: 30 TABLET | Refills: 11 | Status: SHIPPED | OUTPATIENT
Start: 2018-08-23 | End: 2019-11-01 | Stop reason: SDUPTHER

## 2018-08-23 NOTE — PROGRESS NOTES
"Subjective   Carlie Brennan is a 58 y.o. female.     History of Present Illness   Carlie Brennan 58 y.o. female who is here to establish as a new patient. In a past had been to see .  Past medical and surgical history, family and social history was reviewed and/or obtained by me in the interview and recorded in the EHR. [facility] records available in [facility] had been reviewed and discussed with patient.     /74 (BP Location: Left arm, Patient Position: Sitting, Cuff Size: Adult)   Resp 14   Ht 165.1 cm (65\")   Wt 74.8 kg (165 lb)   BMI 27.46 kg/m²     Patient rates her own health as: good.Concerned that she had been having dizzy spells.  Tobacco use: none.  Alcohol use: seldom.  Recreational drugs use: none  Medication list rewieved.  Diet: Weight Watchers.  Exercise: 5-6 days a week, walking and resistance training.  Marital status: .   Employment: full time.  Patient rates her stress level as: high.  Dental health: good. Brushes teeth 2 times a day, flosses 1 time a day . Dental visits: 2 times a year .  Vision correction: reading glasses, had lasik surgery  Hearing: normal.    Recent vaccinations:   Flu  is up to date and recommended yearly  Tdap is due and will be administered today  Shingles prevention discussed and recommended to get Shindrix at [facility]    Patient is postmenopausal. Past pregnancies: . Currently patient is on no HRT .    Patient had developed HTN 2 years ago. Had been treated with Spironolactone with good compliance. No side effects. Not on a salt restriction. Walks a not.    Patient had been having occasional spells of dizziness and nausea. First had happen 15 years ago, was treated with Meclizine at that time. She had 2 episodes in the last few months. Each lasted 3 days or so. Had vertigo and nausea. In a pasty few episodes were triggered by discontinuation of Scopolamine patch that she uses for motion sickness for the travel and cruises. Mother has Meniere's, " sister has migraines. Patient is not on strict low salt diet.    Current Outpatient Prescriptions:   •  meclizine (ANTIVERT) 25 MG tablet, Take 1 tablet by mouth 3 (Three) Times a Day As Needed for dizziness., Disp: 15 tablet, Rfl: 1  •  omeprazole (PriLOSEC) 20 MG capsule, Take 20 mg by mouth daily as needed., Disp: , Rfl:   •  spironolactone (ALDACTONE) 50 MG tablet, Take 1 tablet(s) by mouth twice a day (Patient taking differently: Daily.), Disp: 60 tablet, Rfl: 3     The following portions of the patient's history were reviewed and updated as appropriate: allergies, current medications, past family history, past medical history, past social history, past surgical history and problem list.    Review of Systems   Constitutional: Negative for chills and fever.   HENT: Negative for postnasal drip, sinus pressure and sore throat.    Eyes: Negative for pain and itching.   Respiratory: Negative for cough and chest tightness.    Cardiovascular: Negative for chest pain and leg swelling.   Gastrointestinal: Negative for abdominal pain and blood in stool.   Endocrine: Negative for cold intolerance and heat intolerance.   Genitourinary: Negative for difficulty urinating and flank pain.   Musculoskeletal: Negative for back pain and neck pain.   Skin: Negative for color change and rash.   Neurological: Negative for dizziness, weakness and headaches.   Hematological: Negative for adenopathy. Does not bruise/bleed easily.   Psychiatric/Behavioral: Negative for sleep disturbance. The patient is not nervous/anxious.        Objective   Physical Exam   Constitutional: She is oriented to person, place, and time. Vital signs are normal. She appears well-developed. No distress.   HENT:   Head: Normocephalic and atraumatic.   Right Ear: External ear normal.   Left Ear: External ear normal.   Nose: Nose normal. No mucosal edema. Right sinus exhibits no maxillary sinus tenderness and no frontal sinus tenderness. Left sinus exhibits no  maxillary sinus tenderness and no frontal sinus tenderness.   Mouth/Throat: Oropharynx is clear and moist. No oropharyngeal exudate.   Eyes: Pupils are equal, round, and reactive to light. Conjunctivae, EOM and lids are normal. Right eye exhibits no discharge. Left eye exhibits no discharge. Right conjunctiva is not injected. Left conjunctiva is not injected. No scleral icterus. Right eye exhibits normal extraocular motion. Left eye exhibits normal extraocular motion.   Neck: Normal range of motion and full passive range of motion without pain. Neck supple. No JVD present. Carotid bruit is not present. No thyromegaly present.   Cardiovascular: Normal rate, regular rhythm, S1 normal, S2 normal, normal heart sounds and intact distal pulses.  PMI is not displaced.  Exam reveals no gallop and no friction rub.    No murmur heard.  Pulmonary/Chest: Effort normal and breath sounds normal. No accessory muscle usage. No respiratory distress. She has no decreased breath sounds. She has no wheezes. She has no rhonchi. She has no rales.   Abdominal: Soft. Bowel sounds are normal. She exhibits no distension, no pulsatile liver, no fluid wave, no abdominal bruit, no ascites and no mass. There is no tenderness. There is no rebound and no guarding.   Musculoskeletal: She exhibits no edema or deformity.   Lymphadenopathy:        Head (right side): No submental, no submandibular, no preauricular, no posterior auricular and no occipital adenopathy present.        Head (left side): No submental, no submandibular, no tonsillar, no preauricular, no posterior auricular and no occipital adenopathy present.     She has no cervical adenopathy.        Right cervical: No superficial cervical, no deep cervical and no posterior cervical adenopathy present.       Left cervical: No superficial cervical, no deep cervical and no posterior cervical adenopathy present.        Right: No supraclavicular adenopathy present.        Left: No  supraclavicular adenopathy present.   Neurological: She is alert and oriented to person, place, and time. She has normal strength and normal reflexes. She displays normal reflexes. No cranial nerve deficit. She exhibits normal muscle tone. Coordination normal.   Reflex Scores:       Bicep reflexes are 2+ on the right side and 2+ on the left side.       Patellar reflexes are 2+ on the right side and 2+ on the left side.  Skin: Skin is warm and dry. No rash noted. She is not diaphoretic. No erythema.   Psychiatric: She has a normal mood and affect. Her speech is normal and behavior is normal. Thought content normal.   Vitals reviewed.      Assessment/Plan   Carlie was seen today for establish care.    Diagnoses and all orders for this visit:    Health care maintenance  -     Hepatitis C Antibody; Future  -     CBC & Differential; Future  -     TSH; Future    Post-menopause  -     DEXA Bone Density Axial; Future    Essential hypertension  -     triamterene-hydrochlorothiazide (MAXZIDE-25) 37.5-25 MG per tablet; Take 1 tablet by mouth Daily.    Meniere's disease of both ears  -     triamterene-hydrochlorothiazide (MAXZIDE-25) 37.5-25 MG per tablet; Take 1 tablet by mouth Daily.  -     Audiometry With Tympanometry; Future        Risk evaluation:  1. Cardiovascular risk factors: hypertension . Her calculated next 10 years CV risk is still very low at 2.7%.  2. Diabetes risk factors: large babies at childbirth.  3. Cancer risk factors: no risk factors for cancer.  4. Risky behavior: none. Use of seat belts: regular. Use of sunscreens: regular. SPF 50.   Tattoos: none.  H/o blood transfusions/organ transplants before 1992 or clotting factor transfusion before 1987: none.     Prevention:  Cholesterol test  up to date. Cholesterol is Your next 10 years cardiovascular disease risk calculated according to American College of Cardiology Guidelines is still low at 2.7 % and no medical treatment is indicated at that time..  Blood  sugar test : had borderline fasting blood sugar in June. Needs to limit starches and sweets in the diet. Will monitor.  Hep C testing (for patients born [id number]): discussed and recommended, will proceed with testing..  Mammogram up to date. Recommended every year.. Breast self exams recommended once a month.  Colonoscopy: up to date. Recommended every 5 years. Next screening is recommended in 2023..  PAP smear : up to date. Recommendations per GYN..  DEXA : is due, will schedule. Benefits explained.      HTN - well controlled  with current medication. Reminded of the importance of low salt diet. Normal kidney tests and electrolytes as checked in June 2018. Will change diuretic as abovce and reevaluate in a month.. Continue same treatment.                   Meniere's disease - will change Spironolactone to Maxzide and reevaluate. Refer for audiogram. Limit daily salt intake to 2-2.5 gm, and avoid high doses of salt or coffeine or alcohol at once.  H/o iron deficiency anemia - repeat CBC.

## 2018-08-23 NOTE — PATIENT INSTRUCTIONS
Risk evaluation:  1. Cardiovascular risk factors: hypertension . Her calculated next 10 years CV risk is still very low at 2.7%.  2. Diabetes risk factors: large babies at childbirth.  3. Cancer risk factors: no risk factors for cancer.  4. Risky behavior: none. Use of seat belts: regular. Use of sunscreens: regular. SPF 50.   Tattoos: none.  H/o blood transfusions/organ transplants before 1992 or clotting factor transfusion before 1987: none.     Prevention:  Cholesterol test  up to date. Cholesterol is Your next 10 years cardiovascular disease risk calculated according to American College of Cardiology Guidelines is still low at 2.7 % and no medical treatment is indicated at that time..  Blood sugar test : had borderline fasting blood sugar in June. Needs to limit starches and sweets in the diet. Will monitor.  Hep C testing (for patients born [id number]): discussed and recommended, will proceed with testing..  Mammogram up to date. Recommended every year.. Breast self exams recommended once a month.  Colonoscopy: up to date. Recommended every 5 years. Next screening is recommended in 2023..  PAP smear : up to date. Recommendations per GYN..  DEXA : is due, will schedule. Benefits explained.      HTN - well controlled  with current medication. Reminded of the importance of low salt diet. Normal kidney tests and electrolytes as checked in June 2018. Will change diuretic as abovce and reevaluate in a month.. Continue same treatment.                   Meniere's disease - will change Spironolactone to Maxzide and reevaluate. Refer for audiogram. Limit daily salt intake to 2-2.5 gm, and avoid high doses of salt or coffeine or alcohol at once.  H/o iron deficiency anemia - repeat CBC.

## 2018-08-29 ENCOUNTER — LAB (OUTPATIENT)
Dept: INTERNAL MEDICINE | Facility: CLINIC | Age: 58
End: 2018-08-29

## 2018-08-29 DIAGNOSIS — Z00.00 HEALTH CARE MAINTENANCE: ICD-10-CM

## 2018-08-29 DIAGNOSIS — D75.1 POLYCYTHEMIA: ICD-10-CM

## 2018-08-29 LAB
BASOPHILS # BLD AUTO: 0.04 10*3/MM3 (ref 0–0.2)
BASOPHILS NFR BLD AUTO: 0.7 % (ref 0–2)
DEPRECATED RDW RBC AUTO: 38.9 FL (ref 37–54)
EOSINOPHIL # BLD AUTO: 0.15 10*3/MM3 (ref 0–0.7)
EOSINOPHIL NFR BLD AUTO: 2.6 % (ref 0–5)
ERYTHROCYTE [DISTWIDTH] IN BLOOD BY AUTOMATED COUNT: 12.3 % (ref 11.5–15)
HCT VFR BLD AUTO: 49.1 % (ref 34.1–44.9)
HGB BLD-MCNC: 16.9 G/DL (ref 11.2–15.7)
LYMPHOCYTES # BLD AUTO: 2.29 10*3/MM3 (ref 0.8–7)
LYMPHOCYTES NFR BLD AUTO: 39.4 % (ref 10–60)
MCH RBC QN AUTO: 30.2 PG (ref 26–34)
MCHC RBC AUTO-ENTMCNC: 34.4 G/DL (ref 31–37)
MCV RBC AUTO: 87.7 FL (ref 80–100)
MONOCYTES # BLD AUTO: 0.6 10*3/MM3 (ref 0–1)
MONOCYTES NFR BLD AUTO: 10.3 % (ref 0–13)
NEUTROPHILS # BLD AUTO: 2.73 10*3/MM3 (ref 1–11)
NEUTROPHILS NFR BLD AUTO: 47 % (ref 30–85)
PLATELET # BLD AUTO: 281 10*3/MM3 (ref 150–450)
PMV BLD AUTO: 9.4 FL (ref 6–12)
RBC # BLD AUTO: 5.6 10*6/MM3 (ref 3.93–5.22)
TSH SERPL-ACNC: 2.29 MIU/ML (ref 0.27–4.2)
WBC NRBC COR # BLD: 5.81 10*3/MM3 (ref 5–10)

## 2018-08-29 PROCEDURE — 85025 COMPLETE CBC W/AUTO DIFF WBC: CPT | Performed by: INTERNAL MEDICINE

## 2018-08-30 DIAGNOSIS — D75.1 POLYCYTHEMIA: Primary | ICD-10-CM

## 2018-08-30 PROBLEM — D50.9 IRON DEFICIENCY ANEMIA: Status: RESOLVED | Noted: 2018-08-23 | Resolved: 2018-08-30

## 2018-08-30 LAB
HCV AB S/CO SERPL IA: 0.1 S/CO RATIO (ref 0–0.9)
SPECIMEN STATUS: NORMAL

## 2018-08-31 ENCOUNTER — CLINICAL SUPPORT (OUTPATIENT)
Dept: INTERNAL MEDICINE | Facility: CLINIC | Age: 58
End: 2018-08-31

## 2018-08-31 DIAGNOSIS — Z78.0 POST-MENOPAUSE: ICD-10-CM

## 2018-08-31 LAB
ETHNIC BACKGROUND STATED: 5.9 MIU/ML (ref 2.6–18.5)
REF LAB TEST METHOD: NORMAL

## 2018-08-31 PROCEDURE — 77080 DXA BONE DENSITY AXIAL: CPT | Performed by: INTERNAL MEDICINE

## 2018-09-10 ENCOUNTER — LAB (OUTPATIENT)
Dept: LAB | Facility: HOSPITAL | Age: 58
End: 2018-09-10

## 2018-09-10 DIAGNOSIS — D75.1 POLYCYTHEMIA: ICD-10-CM

## 2018-09-10 PROCEDURE — 81270 JAK2 GENE: CPT

## 2018-09-10 PROCEDURE — 36415 COLL VENOUS BLD VENIPUNCTURE: CPT

## 2018-09-13 LAB
JAK2 P.V617F BLD/T QL: NORMAL
LAB DIRECTOR NAME PROVIDER: NORMAL
LABORATORY COMMENT REPORT: NORMAL

## 2018-09-14 DIAGNOSIS — H81.03 MENIERE'S DISEASE OF BOTH EARS: ICD-10-CM

## 2018-09-20 ENCOUNTER — OFFICE VISIT (OUTPATIENT)
Dept: INTERNAL MEDICINE | Facility: CLINIC | Age: 58
End: 2018-09-20

## 2018-09-20 VITALS
RESPIRATION RATE: 14 BRPM | DIASTOLIC BLOOD PRESSURE: 70 MMHG | BODY MASS INDEX: 27.16 KG/M2 | HEIGHT: 65 IN | WEIGHT: 163 LBS | SYSTOLIC BLOOD PRESSURE: 110 MMHG

## 2018-09-20 DIAGNOSIS — I10 ESSENTIAL HYPERTENSION: Primary | ICD-10-CM

## 2018-09-20 DIAGNOSIS — D75.1 POLYCYTHEMIA: Primary | ICD-10-CM

## 2018-09-20 DIAGNOSIS — H81.01 MENIERE'S DISEASE OF RIGHT EAR: ICD-10-CM

## 2018-09-20 DIAGNOSIS — D75.1 POLYCYTHEMIA: ICD-10-CM

## 2018-09-20 PROBLEM — Z12.11 SCREEN FOR COLON CANCER: Status: RESOLVED | Noted: 2018-02-13 | Resolved: 2018-09-20

## 2018-09-20 LAB
ANION GAP SERPL CALCULATED.3IONS-SCNC: 10 MMOL/L
BASOPHILS # BLD AUTO: 0.04 10*3/MM3 (ref 0–0.2)
BASOPHILS NFR BLD AUTO: 0.7 % (ref 0–2)
BUN BLD-MCNC: 14 MG/DL (ref 6–20)
BUN/CREAT SERPL: 16.5 (ref 7–25)
CALCIUM SPEC-SCNC: 9.7 MG/DL (ref 8.6–10.5)
CHLORIDE SERPL-SCNC: 101 MMOL/L (ref 98–107)
CO2 SERPL-SCNC: 30 MMOL/L (ref 22–29)
CREAT BLD-MCNC: 0.85 MG/DL (ref 0.57–1)
DEPRECATED RDW RBC AUTO: 39.5 FL (ref 37–54)
EOSINOPHIL # BLD AUTO: 0.13 10*3/MM3 (ref 0–0.7)
EOSINOPHIL NFR BLD AUTO: 2.4 % (ref 0–5)
ERYTHROCYTE [DISTWIDTH] IN BLOOD BY AUTOMATED COUNT: 12.5 % (ref 11.5–15)
GFR SERPL CREATININE-BSD FRML MDRD: 69 ML/MIN/1.73
GLUCOSE BLD-MCNC: 99 MG/DL (ref 65–99)
HCT VFR BLD AUTO: 41.6 % (ref 34.1–44.9)
HGB BLD-MCNC: 14.5 G/DL (ref 11.2–15.7)
LYMPHOCYTES # BLD AUTO: 2.45 10*3/MM3 (ref 0.8–7)
LYMPHOCYTES NFR BLD AUTO: 45.3 % (ref 10–60)
MCH RBC QN AUTO: 30.7 PG (ref 26–34)
MCHC RBC AUTO-ENTMCNC: 34.9 G/DL (ref 31–37)
MCV RBC AUTO: 88.1 FL (ref 80–100)
MONOCYTES # BLD AUTO: 0.56 10*3/MM3 (ref 0–1)
MONOCYTES NFR BLD AUTO: 10.4 % (ref 0–13)
NEUTROPHILS # BLD AUTO: 2.23 10*3/MM3 (ref 1–11)
NEUTROPHILS NFR BLD AUTO: 41.2 % (ref 30–85)
PLATELET # BLD AUTO: 250 10*3/MM3 (ref 150–450)
PMV BLD AUTO: 9 FL (ref 6–12)
POTASSIUM BLD-SCNC: 4.2 MMOL/L (ref 3.5–5.2)
RBC # BLD AUTO: 4.72 10*6/MM3 (ref 3.93–5.22)
SODIUM BLD-SCNC: 141 MMOL/L (ref 136–145)
WBC NRBC COR # BLD: 5.41 10*3/MM3 (ref 5–10)

## 2018-09-20 PROCEDURE — 36415 COLL VENOUS BLD VENIPUNCTURE: CPT | Performed by: INTERNAL MEDICINE

## 2018-09-20 PROCEDURE — 99213 OFFICE O/P EST LOW 20 MIN: CPT | Performed by: INTERNAL MEDICINE

## 2018-09-20 PROCEDURE — 80048 BASIC METABOLIC PNL TOTAL CA: CPT | Performed by: INTERNAL MEDICINE

## 2018-09-20 PROCEDURE — 85025 COMPLETE CBC W/AUTO DIFF WBC: CPT | Performed by: INTERNAL MEDICINE

## 2018-09-20 NOTE — PROGRESS NOTES
Please call patient: please cALL PATIENT: PERFECTLY NORMAL LABS!! No need for any extra tests. Will see her in 6 months as planned.

## 2018-09-20 NOTE — PATIENT INSTRUCTIONS
HTN - well controlled  with current medication. Reminded of the importance of low salt diet. Will check kidney tests and electrolytes. Continue same treatment.  Menieres disease - doing well with low salt diet and Triamterene/HCTZ. Continue.  Policytemia - normal erythropoietin level and negative JAK2 mutation. Will repeat CBC.

## 2018-09-20 NOTE — PROGRESS NOTES
"Subjective   Carlie Brennan is a 58 y.o. female.     History of Present Illness   Carlie Brennan 58 y.o. female presents today for benign essential hypertension f/u. Last was seen on 08/23/2018 and on that visit medication was changed due to newly diagnosed Menieres disease..We had discontinued Spironolactone and started Triamterene/HCTZ.  Patient is compliant with treatment and denies  side effects. Patient reports no change in symptoms with medication change.Had no more episodes of lightheadedness/vertigo.    Patient was noticed to have elevated Hb and Hct. Erythropoietin level is normal and PATRICA 2 mutation was negative.    /70 (BP Location: Left arm, Patient Position: Sitting, Cuff Size: Adult)   Resp 14   Ht 165.1 cm (65\")   Wt 73.9 kg (163 lb)   BMI 27.12 kg/m²     Current Outpatient Prescriptions:   •  meclizine (ANTIVERT) 25 MG tablet, Take 1 tablet by mouth 3 (Three) Times a Day As Needed for dizziness., Disp: 15 tablet, Rfl: 1  •  omeprazole (PriLOSEC) 20 MG capsule, Take 20 mg by mouth daily as needed., Disp: , Rfl:   •  triamterene-hydrochlorothiazide (MAXZIDE-25) 37.5-25 MG per tablet, Take 1 tablet by mouth Daily., Disp: 30 tablet, Rfl: 11  The following portions of the patient's history were reviewed and updated as appropriate: allergies, current medications, past family history, past medical history, past social history, past surgical history and problem list.    Review of Systems   Constitutional: Negative for chills and fever.   Eyes: Negative for pain and redness.   Respiratory: Negative for cough and shortness of breath.    Cardiovascular: Negative for chest pain and leg swelling.   Neurological: Negative for dizziness and headaches.       Objective   Physical Exam   Constitutional: She is oriented to person, place, and time. She appears well-developed and well-nourished.   HENT:   Head: Normocephalic and atraumatic.   Right Ear: Tympanic membrane, external ear and ear canal normal. "   Left Ear: Tympanic membrane, external ear and ear canal normal.   Nose: Nose normal. Right sinus exhibits no maxillary sinus tenderness and no frontal sinus tenderness. Left sinus exhibits no maxillary sinus tenderness and no frontal sinus tenderness.   Mouth/Throat: Uvula is midline, oropharynx is clear and moist and mucous membranes are normal.   Eyes: Pupils are equal, round, and reactive to light. Conjunctivae and EOM are normal. Right eye exhibits no discharge. Left eye exhibits no discharge. No scleral icterus.   Neck: Neck supple. No JVD present.   Cardiovascular: Normal rate, regular rhythm and normal heart sounds.  Exam reveals no gallop and no friction rub.    No murmur heard.  Pulmonary/Chest: Effort normal and breath sounds normal. She has no wheezes. She has no rales.   Musculoskeletal: She exhibits no edema.   Lymphadenopathy:     She has no cervical adenopathy.   Neurological: She is alert and oriented to person, place, and time. No cranial nerve deficit.   Skin: Skin is warm and dry. No rash noted.   Psychiatric: She has a normal mood and affect. Her behavior is normal.   Vitals reviewed.      Assessment/Plan   Carlie was seen today for hypertension.    Diagnoses and all orders for this visit:    Essential hypertension    Meniere's disease of both ears    Polycythemia  -     CBC & Differential; Future    HTN - well controlled  with current medication. Reminded of the importance of low salt diet. Will check kidney tests and electrolytes. Continue same treatment.  Menieres disease - doing well with low salt diet and Triamterene/HCTZ. Continue.  Policytemia - normal erythropoietin level and negative JAK2 mutation. Will repeat CBC.

## 2018-10-10 ENCOUNTER — APPOINTMENT (OUTPATIENT)
Dept: WOMENS IMAGING | Facility: HOSPITAL | Age: 58
End: 2018-10-10

## 2018-10-10 PROCEDURE — 77067 SCR MAMMO BI INCL CAD: CPT | Performed by: RADIOLOGY

## 2018-10-31 ENCOUNTER — OFFICE VISIT (OUTPATIENT)
Dept: INTERNAL MEDICINE | Facility: CLINIC | Age: 58
End: 2018-10-31

## 2018-10-31 VITALS
TEMPERATURE: 99.3 F | DIASTOLIC BLOOD PRESSURE: 86 MMHG | HEART RATE: 76 BPM | OXYGEN SATURATION: 99 % | BODY MASS INDEX: 27.16 KG/M2 | WEIGHT: 163 LBS | SYSTOLIC BLOOD PRESSURE: 110 MMHG | HEIGHT: 65 IN

## 2018-10-31 DIAGNOSIS — J01.90 ACUTE SINUSITIS, RECURRENCE NOT SPECIFIED, UNSPECIFIED LOCATION: Primary | ICD-10-CM

## 2018-10-31 PROCEDURE — 99213 OFFICE O/P EST LOW 20 MIN: CPT | Performed by: NURSE PRACTITIONER

## 2018-10-31 RX ORDER — GUAIFENESIN 600 MG/1
600 TABLET, EXTENDED RELEASE ORAL EVERY 12 HOURS SCHEDULED
Qty: 14 TABLET | Refills: 0
Start: 2018-10-31 | End: 2018-11-07

## 2018-10-31 RX ORDER — FLUTICASONE PROPIONATE 50 MCG
2 SPRAY, SUSPENSION (ML) NASAL DAILY
Qty: 16 G | Refills: 1 | Status: SHIPPED | OUTPATIENT
Start: 2018-10-31 | End: 2018-11-30

## 2018-10-31 RX ORDER — AMOXICILLIN 875 MG/1
875 TABLET, COATED ORAL 2 TIMES DAILY
Qty: 14 TABLET | Refills: 0 | Status: SHIPPED | OUTPATIENT
Start: 2018-10-31 | End: 2018-11-07

## 2018-10-31 NOTE — PATIENT INSTRUCTIONS

## 2018-10-31 NOTE — PROGRESS NOTES
Subjective   Carlie Brennan is a 58 y.o. female.     She did have flu shot about 2 weeks ago. She has no recent travel. Several individuals at work have been sick      Sinusitis   This is a new problem. The current episode started in the past 7 days. The problem has been gradually worsening since onset. There has been no fever. Her pain is at a severity of 7/10. The pain is mild. Associated symptoms include chills, congestion, ear pain (both ), headaches, sinus pressure and a sore throat (irritated). Pertinent negatives include no coughing, shortness of breath or sneezing. (Body aches ) Treatments tried: aleve  The treatment provided mild relief.        The following portions of the patient's history were reviewed and updated as appropriate: allergies, current medications, past family history, past medical history, past social history, past surgical history and problem list.    Review of Systems   Constitutional: Positive for chills. Negative for appetite change, fatigue and fever.   HENT: Positive for congestion, ear pain (both ), sinus pressure and sore throat (irritated). Negative for ear discharge, facial swelling, hearing loss, postnasal drip, rhinorrhea, sneezing and tinnitus.    Respiratory: Negative for cough, chest tightness, shortness of breath and wheezing.    Cardiovascular: Negative for chest pain, palpitations and leg swelling.   Gastrointestinal: Negative for abdominal pain, diarrhea, nausea and vomiting.   Musculoskeletal: Positive for myalgias.   Allergic/Immunologic: Negative for environmental allergies.   Neurological: Positive for headaches.   Hematological: Negative for adenopathy.       Objective   Physical Exam   Constitutional: She appears well-developed and well-nourished. She is cooperative. She does not have a sickly appearance. She does not appear ill.   HENT:   Head: Normocephalic.   Right Ear: Hearing, tympanic membrane and external ear normal. No drainage, swelling or tenderness. No  mastoid tenderness. Tympanic membrane is not injected, not scarred, not erythematous and not bulging. Tympanic membrane mobility is normal. No middle ear effusion. No decreased hearing is noted.   Left Ear: Hearing, tympanic membrane and external ear normal. No drainage, swelling or tenderness. No mastoid tenderness. Tympanic membrane is not injected, not scarred, not erythematous and not bulging. Tympanic membrane mobility is normal.  No middle ear effusion. No decreased hearing is noted.   Nose: No mucosal edema, rhinorrhea, sinus tenderness or nasal deformity. Right sinus exhibits maxillary sinus tenderness and frontal sinus tenderness. Left sinus exhibits maxillary sinus tenderness and frontal sinus tenderness.   Mouth/Throat: Mucous membranes are normal. Normal dentition. Posterior oropharyngeal erythema present. No tonsillar exudate.   Eyes: Pupils are equal, round, and reactive to light. Conjunctivae and lids are normal. Right eye exhibits no discharge and no exudate. Left eye exhibits no discharge and no exudate.   Neck: Trachea normal and normal range of motion. No edema present. No thyroid mass and no thyromegaly present.   Cardiovascular: Regular rhythm, normal heart sounds and normal pulses.    No murmur heard.  Pulmonary/Chest: Breath sounds normal. No respiratory distress. She has no decreased breath sounds. She has no wheezes. She has no rhonchi. She has no rales.   Lymphadenopathy:        Head (right side): No submental, no submandibular, no tonsillar, no preauricular, no posterior auricular and no occipital adenopathy present.        Head (left side): No submental, no submandibular, no tonsillar, no preauricular, no posterior auricular and no occipital adenopathy present.     She has no cervical adenopathy.   Neurological: She is alert.   Skin: Skin is warm, dry and intact. No cyanosis. Nails show no clubbing.       Assessment/Plan   Carlie was seen today for sinusitis.    Diagnoses and all orders  for this visit:    Acute sinusitis, recurrence not specified, unspecified location  Comments:  drink plenty of water   Orders:  -     guaiFENesin (MUCINEX) 600 MG 12 hr tablet; Take 1 tablet by mouth Every 12 (Twelve) Hours for 7 days.  -     fluticasone (FLONASE) 50 MCG/ACT nasal spray; 2 sprays into the nostril(s) as directed by provider Daily for 30 days. Administer 2 sprays in each nostril for each dose.  -     amoxicillin (AMOXIL) 875 MG tablet; Take 1 tablet by mouth 2 (Two) Times a Day for 7 days.    She will return for worsening of symptoms. She will wait 2-3 days prior to starting antibiotic.

## 2019-02-20 ENCOUNTER — TRANSCRIBE ORDERS (OUTPATIENT)
Dept: PHYSICAL THERAPY | Facility: HOSPITAL | Age: 59
End: 2019-02-20

## 2019-02-20 DIAGNOSIS — S46.912A STRAIN OF LEFT SHOULDER, INITIAL ENCOUNTER: Primary | ICD-10-CM

## 2019-02-21 ENCOUNTER — HOSPITAL ENCOUNTER (OUTPATIENT)
Dept: PHYSICAL THERAPY | Facility: HOSPITAL | Age: 59
Setting detail: THERAPIES SERIES
Discharge: HOME | End: 2019-02-21

## 2019-02-21 DIAGNOSIS — M25.512 LEFT SHOULDER PAIN, UNSPECIFIED CHRONICITY: Primary | ICD-10-CM

## 2019-02-21 PROCEDURE — 97162 PT EVAL MOD COMPLEX 30 MIN: CPT

## 2019-02-21 PROCEDURE — 97110 THERAPEUTIC EXERCISES: CPT

## 2019-02-21 NOTE — THERAPY EVALUATION
"    Outpatient Physical Therapy Ortho Initial Evaluation  [facility]     Patient Name: Carlie Brennan  : 1960  MRN: [id number]  Today's Date: 2019      Visit Date: 2019    Patient Active Problem List   Diagnosis   • Essential hypertension   • Gastroesophageal reflux disease without esophagitis   • Mitral valve prolapse   • Rosacea   • Hyperlipidemia   • Health care maintenance   • History of adenomatous polyp of colon   • Meniere's disease of right ear   • Polycythemia        Past Medical History:   Diagnosis Date   • GERD (gastroesophageal reflux disease)    • Hypertension    • Iron deficiency anemia 2018   • Pregnancy        • Rosacea    • Vertigo         Past Surgical History:   Procedure Laterality Date   • ABDOMINOPLASTY     •  SECTION      x 2   • COLONOSCOPY N/A 3/12/2018    Procedure: COLONOSCOPY INTO CECUM AND NORMAL TI WITH HOT SNARE POLYPECTOMY;  Surgeon: Lincoln Chino MD;  Location: [facility] ENDOSCOPY;  Service: Gastroenterology   • LASIK         Visit Dx:     ICD-10-CM ICD-9-CM   1. Left shoulder pain, unspecified chronicity M25.512 719.41       Patient History     Row Name 19 1500             History    Chief Complaint  Pain  -CA      Type of Pain  Shoulder pain  -CA      Date Current Problem(s) Began  19  -CA      Brief Description of Current Complaint  Ms. Brennan is a 58 y.o. female referred to PT for L shoulder pain. 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 Pt went to occupational medicine and was referred to PT for RTC strain, no imaging has been done. Pt denies n/t in the L UE. Pt reports difficulty with upper body dressing, reaching behind back, reaching overhead, and stocking at work. Pt has had to modify some of her job tasks and " ADLs. PMH includes mitral valve prolapse, high BP.   -CA      Previous treatment for THIS PROBLEM  Medication  -CA      Current Tobacco Use  No  -CA      Smoking Status  never  -CA      Hand Dominance  right-handed  -CA      Occupation/sports/leisure activities  Materials Management Employee at [employer]  -CA         Pain     Pain Location  Shoulder  -CA      Pain at Present  0  -CA      Pain at Best  0  -CA      Pain at Worst  7  -CA      Pain Frequency  Intermittent  -CA      Pain Description  Aching  -CA      What Performance Factors Make the Current Problem(s) WORSE?  activity: overhead, reaching behind back  -CA      What Performance Factors Make the Current Problem(s) BETTER?  ice, heat, Aleve, Naprosyn  -CA      Is your sleep disturbed?  No  -CA         Fall Risk Assessment    Any falls in the past year:  Yes  -CA         Daily Activities    Primary Language  English  -CA      Are you able to read  Yes  -CA      Are you able to write  Yes  -CA      How does patient learn best?  Listening;Reading;Demonstration  -CA      Teaching needs identified  Home Exercise Program;Management of Condition  -CA      Patient is concerned about/has problems with  Difficulty with self care (i.e. bathing, dressing, toileting:;Performing home management (household chores, shopping, care of dependents);Performing job responsibilities/community activities (work, school,;Reaching over head;Repetitive movements of the hand, arm, shoulder  -CA      Does patient have problems with the following?  None  -CA      Barriers to learning  None  -CA      Pt Participated in POC and Goals  Yes  -CA         Safety    Are you being hurt, hit, or frightened by anyone at home or in your life?  No  -CA      Are you being neglected by a caregiver  No  -CA        User Key  (r) = Recorded By, (t) = Taken By, (c) = Cosigned By    Initials Name Provider Type    Nina Jordan PT Physical Therapist          PT Ortho     Row Name 02/21/19 6528        Posture/Observations    Posture/Observations Comments  mild fwd head and rounded shoulders  -CA       Shoulder Impingement/Rotator Cuff Special Tests    Leo-Robson Test (RC Lesion vs. Bursitis)  Left:;Positive  -CA    Neer Impingement Test (RC Lesion vs. Bursitis)  Left:;Negative  -CA    Full Can Test (RC Lesion)  Left:;Positive  -CA    Empty Can Test (RC Lesion)  Left:;Positive  -CA    Drop Arm Test (Full Thickness RC Lesion)  Left:;Positive  -CA    Shoulder Impingement/Rotator Cuff Special Tests Comments  R side all negative  -CA       Shoulder Girdle Palpation    Shoulder Girdle Palpation?  No Abnormality/Tenderness  -CA       Right Upper Ext    Rt Shoulder Flexion AROM  0-175 in supine  -CA    Rt Shoulder External Rotation AROM  Occiput  -CA    Rt Shoulder Internal Rotation AROM  Sacrum  -CA       Left Upper Ext    Lt Shoulder Flexion AROM  0-125 in supine  -CA    Lt Shoulder External Rotation AROM  T3  -CA    Lt Shoulder Internal Rotation AROM  T5  -CA       MMT Right Upper Ext    Rt Shoulder Flexion MMT, Gross Movement  (5/5) normal  -CA    Rt Shoulder ABduction MMT, Gross Movement  (5/5) normal  -CA    Rt Shoulder Internal Rotation MMT, Gross Movement  (5/5) normal  -CA    Rt Shoulder External Rotation MMT, Gross Movement  (5/5) normal  -CA    Rt Elbow Flexion MMT, Gross Movement:  (5/5) normal  -CA    Rt Elbow Extension MMT, Gross Movement:  (5/5) normal  -CA    Rt Wrist Flexion MMT, Gross Movement  (5/5) normal  -CA    Rt Wrist Extension MMT, Gross Movement  (5/5) normal  -CA       MMT Left Upper Ext    Lt Shoulder Flexion MMT, Gross Movement  (2/5) poor  -CA    Lt Shoulder ABduction MMT, Gross Movement  (2/5) poor  -CA    Lt Shoulder Internal Rotation MMT, Gross Movement  (3-/5) fair minus  -CA    Lt Shoulder External Rotation MMT, Gross Movement  (3-/5) fair minus  -CA    Lt Elbow Flexion MMT, Gross Movement  (5/5) normal  -CA    Lt Elbow Extension MMT, Gross Movement  (5/5) normal  -CA    Lt Wrist  Flexion MMT, Gross Movement  (5/5) normal  -CA    Lt Wrist Extension MMT, Gross Movement  (5/5) normal  -CA       Sensation    Sensation WNL?  WNL  -CA      User Key  (r) = Recorded By, (t) = Taken By, (c) = Cosigned By    Initials Name Provider Type    Nina Jordan, PT Physical Therapist                            PT OP Goals     Row Name 02/21/19 1800          PT Short Term Goals    STG Date to Achieve  03/21/19  -CA     STG 1  Pt will be independent and verbalized good understanding of initial HEP to improve ability to manage pain, as well as improve strength and ROM  -CA     STG 1 Progress  New  -CA     STG 2  Pt will improve L shoulder flexion to at least 145 deg to improve ability to reach overhead.   -CA     STG 2 Progress  New  -CA        Long Term Goals    LTG Date to Achieve  04/18/19  -CA     LTG 1  Pt will be independent and verbalized good understanding of advanced HEP to improve ability to manage pain, as well as improve strength and ROM.  -CA     LTG 1 Progress  New  -CA     LTG 2  Pt will improve L shoulder flexion to at least 160 deg to improve ability to reach overhead.   -CA     LTG 2 Progress  New  -CA     LTG 3  Pt will report </=3/10 pain in L shoulder with overhead activities to improve participation in ADLs.  -CA     LTG 3 Progress  New  -CA     LTG 4  Pt will improve DASH score to at least </=20% perceived disability to show overall improved quality of life.  -CA     LTG 4 Progress  New  -CA       User Key  (r) = Recorded By, (t) = Taken By, (c) = Cosigned By    Initials Name Provider Type    Nina Jordan, PT Physical Therapist          PT Assessment/Plan     Row Name 02/21/19 1800          PT Assessment    Functional Limitations  Limitation in home management;Limitations in community activities;Performance in leisure activities;Performance in self-care ADL;Performance in work activities  -CA     Impairments  Range of motion;Posture;Pain;Muscle strength;Joint mobility  -CA      Assessment Comments  Carlie Brennan is a 58 y.o. female referred to physical therapy for L RTC strain. She presents with a Evolving clinical presentation, along with comorbidities of mitral valve prolapse and personal factors of active job that requires overhead lifting  that may impact her progress in the plan of care. Pt presents today with L shoulder weakness, decreased shoulder ROM, pain with activity, and poor posture . These impairments limit her ability to reach overhead, perform job requirements, don upper body garments, and reach behind her back. Pt will benefit from skilled PT to address the previous impairments and return to PLOF.  -CA     Please refer to paper survey for additional self-reported information  Yes  -CA     Rehab Potential  Good  -CA     Patient/caregiver participated in establishment of treatment plan and goals  Yes  -CA     Patient would benefit from skilled therapy intervention  Yes  -CA        PT Plan    PT Frequency  2x/week  -CA     Predicted Duration of Therapy Intervention (Therapy Eval)  10 weeks  -CA     Planned CPT's?  PT EVAL MOD COMPLELITY: 58798;PT RE-EVAL: 50655;PT THER PROC EA 15 MIN: 13168;PT THER ACT EA 15 MIN: 15757;PT MANUAL THERAPY EA 15 MIN: 07837;PT NEUROMUSC RE-EDUCATION EA 15 MIN: 52957;PT GAIT TRAINING EA 15 MIN: 29100;PT SELF CARE/HOME MGMT/TRAIN EA 15: 63254;PT ELECTRICAL STIM UNATTEND: ;PT ULTRASOUND EA 15 MIN: 21202;PT HOT/COLD PACK WC NONMCARE: 03034  -CA     PT Plan Comments  PT POC includes manual therapy, L shoulder girdle strengthening, education, postural awareness, l shoulder ROM, and modalities as needed. Next visit consider addition of L shoulder Grade I joint mobs and PROM, rows, and long duration shoulder isometrics.  -CA       User Key  (r) = Recorded By, (t) = Taken By, (c) = Cosigned By    Initials Name Provider Type    Nina Jordan, IMANI Physical Therapist            Exercises     Row Name 02/21/19 1600             Total Minutes     50779 - PT Therapeutic Exercise Minutes  15  -CA         Exercise 1    Exercise Name 1  AAROM, R hand hold L wrist in suping  -CA      Cueing 1  Verbal  -CA      Reps 1  10  -CA      Time 1  10s  -CA      Additional Comments  pain free range  -CA         Exercise 2    Exercise Name 2  Scap Squeeze  -CA      Cueing 2  Verbal  -CA      Reps 2  15  -CA      Time 2  5s  -CA         Exercise 3    Exercise Name 3  Posterior Shldr Roll  -CA      Cueing 3  Verbal  -CA      Reps 3  15  -CA         Exercise 4    Exercise Name 4  B ER Isometric  -CA      Reps 4  3  -CA      Time 4  30s  -CA      Additional Comments  Pt in hooklying, neutral abduction and performing ER isometric by pressing hands into gait belt  -CA        User Key  (r) = Recorded By, (t) = Taken By, (c) = Cosigned By    Initials Name Provider Type    Nina Jordan, IMANI Physical Therapist                        Outcome Measure Options: Disabilities of the Arm, Shoulder, and Hand (DASH)  DASH  Open a tight or new jar.: No Difficulty  Write: No Difficulty  Turn a key: No Difficulty  Prepare a meal: No Difficulty  Push open a heavy door: No Difficulty  Place an object on a shelf above your head: Moderate Difficulty  Do heavy household chores (e.g., wash walls, wash floors): No Difficulty  Garden or do yard work: No Difficulty  Make a bed: No Difficulty  Carry a shopping bag or briefcase: No Difficulty  Carry a heavy object (over 10 lbs): Mild Difficulty  Change a lightbulb overhead: Moderate Difficulty  Wash or blow dry your hair: Moderate Difficulty  Wash your back: Moderate Difficulty  Put on a pullover sweater: Severe Difficulty  Use a knife to cut food: No Difficulty  Recreational activities in which require little effort (e.g., cardplaying, knitting, etc.): No Difficulty  Recreational activities in which you take some force or impact through your arm, should or hand (e.g. golf, hammering, tennis, etc.): Severe Difficulty  Recreational Activities in which  you move your arm freely (e.g., frisbee, badminton, etc.): Severe Difficulty  Manage transportation needs (getting from one place to another): No Difficulty  During the past week, to what extent has your arm, shoulder, or hand problem interfered with your normal social activites with family, friends, neighbors or groups?: Slightly  During the past week, were you limited in your work or other regular daily activities as a result of your arm, shoulder or hand problem?: Very limited  DASH Sum : 44  Number of Questions Answered: 22  DASH Score: 25         Time Calculation:     Therapy Suggested Charges     Code   Minutes Charges    82096 (CPT®) Hc Pt Neuromusc Re Education Ea 15 Min      66598 (CPT®) Hc Pt Ther Proc Ea 15 Min 15 1    45069 (CPT®) Hc Gait Training Ea 15 Min      69808 (CPT®) Hc Pt Therapeutic Act Ea 15 Min      63318 (CPT®) Hc Pt Manual Therapy Ea 15 Min      64059 (CPT®) Hc Pt Ther Massage- Per 15 Min      97045 (CPT®) Hc Pt Iontophoresis Ea 15 Min      68883 (CPT®) Hc Pt Elec Stim Ea-Per 15 Min      16274 (CPT®) Hc Pt Ultrasound Ea 15 Min      65890 (CPT®) Hc Pt Self Care/Mgmt/Train Ea 15 Min      46497 (CPT®) Hc Pt Prosthetic (S) Train Initial Encounter, Each 15 Min      85623 (CPT®) Hc Orthotic(S) Mgmt/Train Initial Encounter, Each 15min      00197 (CPT®) Hc Pt Aquatic Therapy Ea 15 Min      45479 (CPT®) Hc Pt Orthotic(S)/Prosthetic(S) Encounter, Each 15 Min       (CPT®) Hc Pt Electrical Stim Unattended      Total  15 1          Start Time: 1530  Stop Time: 1610  Time Calculation (min): 40 min  Total Timed Code Minutes- PT: 15 minute(s)     Therapy Charges for Today     Code Description Service Date Service Provider Modifiers Qty    28795733605 HC PT THER PROC EA 15 MIN 2/21/2019 Nina Castellanos, PT GP 1    48889566216 HC PT EVAL MOD COMPLEXITY 2 2/21/2019 Nina Castellanos, PT GP 1          PT G-Codes  Outcome Measure Options: Disabilities of the Arm, Shoulder, and Hand (DASH)         Nina  Emanuel, PT  2/21/2019

## 2019-02-26 ENCOUNTER — HOSPITAL ENCOUNTER (OUTPATIENT)
Dept: PHYSICAL THERAPY | Facility: HOSPITAL | Age: 59
Setting detail: THERAPIES SERIES
Discharge: HOME | End: 2019-02-26

## 2019-02-26 DIAGNOSIS — M25.512 LEFT SHOULDER PAIN, UNSPECIFIED CHRONICITY: Primary | ICD-10-CM

## 2019-02-26 PROCEDURE — 97140 MANUAL THERAPY 1/> REGIONS: CPT | Performed by: PHYSICAL THERAPIST

## 2019-02-26 PROCEDURE — 97112 NEUROMUSCULAR REEDUCATION: CPT | Performed by: PHYSICAL THERAPIST

## 2019-02-26 PROCEDURE — 97110 THERAPEUTIC EXERCISES: CPT | Performed by: PHYSICAL THERAPIST

## 2019-02-26 NOTE — THERAPY TREATMENT NOTE
Outpatient Physical Therapy Ortho Treatment Note  [facility]     Patient Name: Carlie Brennan  : 1960  MRN: [id number]  Today's Date: 2019      Visit Date: 2019    Visit Dx:    ICD-10-CM ICD-9-CM   1. Left shoulder pain, unspecified chronicity M25.512 719.41       Patient Active Problem List   Diagnosis   • Essential hypertension   • Gastroesophageal reflux disease without esophagitis   • Mitral valve prolapse   • Rosacea   • Hyperlipidemia   • Health care maintenance   • History of adenomatous polyp of colon   • Meniere's disease of right ear   • Polycythemia        Past Medical History:   Diagnosis Date   • GERD (gastroesophageal reflux disease)    • Hypertension    • Iron deficiency anemia 2018   • Pregnancy        • Rosacea    • Vertigo         Past Surgical History:   Procedure Laterality Date   • ABDOMINOPLASTY     •  SECTION      x 2   • COLONOSCOPY N/A 3/12/2018    Procedure: COLONOSCOPY INTO CECUM AND NORMAL TI WITH HOT SNARE POLYPECTOMY;  Surgeon: Lincoln Chino MD;  Location: [facility] ENDOSCOPY;  Service: Gastroenterology   • LASIK                         PT Assessment/Plan     Row Name 19 0816          PT Assessment    Assessment Comments  Carlie returns for first f/u after initial evaluation left shoulder pain s/s consistent with L RTC strain. Updated HEP as tolerated. Progressed well with theraband activities. Marked increased symptoms with manual techniques, may focus on therapeutic exercises pending progress.   -SC        PT Plan    PT Plan Comments  assess updated HEP, progress with focus on left shoulder girdle strengthening, manual if indicated  -SC       User Key  (r) = Recorded By, (t) = Taken By, (c) = Cosigned By    Initials Name Provider Type    Lashonda Harrison, PT Physical Therapist              Exercises     Row Name 19 0816             Precautions    Existing Precautions/Restrictions  no known precautions/restrictions  -SC          Subjective Comments    Subjective Comments  Doing well. I have been doing my exercises the PT gave me. They hurt but I do them. My arm at rest no pain at all. Just reaching up and reaching behind, like changing my shirt and putting on my jacket. I can do my job ok though. I go back to the MD on 03/12.   -SC         Subjective Pain    Able to rate subjective pain?  yes  -SC      Pre-Treatment Pain Level  0  -SC      Subjective Pain Comment  0/10 at rest, 5/10 with movement  -SC         Total Minutes    86268 - PT Manual Therapy Minutes  10  -SC         Exercise 1    Exercise Name 1  --  -SC      Cueing 1  --  -SC      Reps 1  --  -SC      Time 1  --  -SC         Exercise 2    Exercise Name 2  --  -SC      Cueing 2  --  -SC      Reps 2  --  -SC      Time 2  --  -SC         Exercise 3    Exercise Name 3  --  -SC      Cueing 3  --  -SC      Reps 3  --  -SC         Exercise 4    Exercise Name 4  --  -SC      Reps 4  --  -SC      Time 4  --  -SC         Exercise 5    Exercise Name 5  pulleys flex, ER  -SC      Reps 5  10  -SC      Time 5  5 seconds  -SC      Additional Comments  ea  -SC         Exercise 6    Exercise Name 6  lat pull down TB seated  -SC      Reps 6  10  -SC      Additional Comments  RTB  -SC         Exercise 7    Exercise Name 7  shoulder rows TB standing  -SC      Reps 7  10  -SC      Additional Comments  RTB  -SC         Exercise 8    Exercise Name 8  shoulder ext TB standing  -SC      Reps 8  10  -SC      Additional Comments  RTB  -SC         Exercise 9    Exercise Name 9  shoulder ER TB standing palms up  -SC      Reps 9  10  -SC      Additional Comments  RTB  -SC         Exercise 10    Exercise Name 10  standing doorway stretch (arms down)  -SC      Reps 10  3  -SC      Time 10  20 seconds  -SC        User Key  (r) = Recorded By, (t) = Taken By, (c) = Cosigned By    Initials Name Provider Type    Lashonda Harrison, PT Physical Therapist                        Manual Rx (last 36 hours)       Manual Treatments     Row Name 02/26/19 0816             Total Minutes    95031 - PT Manual Therapy Minutes  10  -SC         Manual Rx 1    Manual Rx 1 Location  left shoulder  -SC      Manual Rx 1 Type  patient supine with one pillow, AP and inferior joint mobs, inferior oscillations and lateral distraction. PROM left shoulder all planes  -SC      Manual Rx 1 Grade  II-IV  -SC        User Key  (r) = Recorded By, (t) = Taken By, (c) = Cosigned By    Initials Name Provider Type    SC Lashonda Hernandez, PT Physical Therapist          PT OP Goals     Row Name 02/26/19 0816          PT Short Term Goals    STG Date to Achieve  03/21/19  -SC     STG 1  Pt will be independent and verbalized good understanding of initial HEP to improve ability to manage pain, as well as improve strength and ROM  -SC     STG 1 Progress  Progressing  -SC     STG 1 Progress Comments  updated and instructed today  -SC     STG 2  Pt will improve L shoulder flexion to at least 145 deg to improve ability to reach overhead.   -SC     STG 2 Progress  Progressing  -SC     STG 2 Progress Comments  achieved AAROM wallslides today without increase in symptoms  -SC        Long Term Goals    LTG Date to Achieve  04/18/19  -SC     LTG 1  Pt will be independent and verbalized good understanding of advanced HEP to improve ability to manage pain, as well as improve strength and ROM.  -SC     LTG 1 Progress  Ongoing  -SC     LTG 2  Pt will improve L shoulder flexion to at least 160 deg to improve ability to reach overhead.   -SC     LTG 2 Progress  Ongoing  -SC     LTG 3  Pt will report </=3/10 pain in L shoulder with overhead activities to improve participation in ADLs.  -[facility] 3 Progress  Progressing  -SC     LTG 4  Pt will improve DASH score to at least </=20% perceived disability to show overall improved quality of life.  -[facility] 4 Progress  Ongoing  -SC        Time Calculation    PT Goal Re-Cert Due Date  04/18/19  -SC       User Key  (r) =  Recorded By, (t) = Taken By, (c) = Cosigned By    Initials Name Provider Type    SC Lashonda Hernandez, PT Physical Therapist          Therapy Education  Education Details: updated HEP and provided red theraband for home program, diagnosis, reasons for symptoms, prognosis  Given: HEP, Symptoms/condition management, Pain management, Mobility training, Other (comment)  Program: New  How Provided: Verbal, Demonstration, Written  Provided to: Patient  Level of Understanding: Teach back education performed, Verbalized, Demonstrated              Time Calculation:   Start Time: 0816  Stop Time: 0855  Time Calculation (min): 39 min  Therapy Suggested Charges     Code   Minutes Charges    69340 (CPT®) Hc Pt Neuromusc Re Education Ea 15 Min      77261 (CPT®) Hc Pt Ther Proc Ea 15 Min      81127 (CPT®) Hc Gait Training Ea 15 Min      04442 (CPT®) Hc Pt Therapeutic Act Ea 15 Min      77703 (CPT®) Hc Pt Manual Therapy Ea 15 Min 10 1    74457 (CPT®) Hc Pt Ther Massage- Per 15 Min      47390 (CPT®) Hc Pt Iontophoresis Ea 15 Min      65995 (CPT®) Hc Pt Elec Stim Ea-Per 15 Min      55778 (CPT®) Hc Pt Ultrasound Ea 15 Min      66190 (CPT®) Hc Pt Self Care/Mgmt/Train Ea 15 Min      66846 (CPT®) Hc Pt Prosthetic (S) Train Initial Encounter, Each 15 Min      50763 (CPT®) Hc Orthotic(S) Mgmt/Train Initial Encounter, Each 15min      44767 (CPT®) Hc Pt Aquatic Therapy Ea 15 Min      53965 (CPT®) Hc Pt Orthotic(S)/Prosthetic(S) Encounter, Each 15 Min       (CPT®) Hc Pt Electrical Stim Unattended      Total  10 1        Therapy Charges for Today     Code Description Service Date Service Provider Modifiers Qty    97472948563 HC PT THER PROC EA 15 MIN 2/26/2019 Lashonda Hernandez, PT GP 1    95639438997 HC PT NEUROMUSC RE EDUCATION EA 15 MIN 2/26/2019 Lashonda Hernandez, PT GP 1    23453543455 HC PT MANUAL THERAPY EA 15 MIN 2/26/2019 Lashonda Hernandez, PT GP 1                    Lashonda Villatoro PT  2/26/2019

## 2019-03-01 ENCOUNTER — HOSPITAL ENCOUNTER (OUTPATIENT)
Dept: PHYSICAL THERAPY | Facility: HOSPITAL | Age: 59
Setting detail: THERAPIES SERIES
Discharge: HOME | End: 2019-03-01

## 2019-03-01 DIAGNOSIS — M25.512 LEFT SHOULDER PAIN, UNSPECIFIED CHRONICITY: Primary | ICD-10-CM

## 2019-03-01 PROCEDURE — 97110 THERAPEUTIC EXERCISES: CPT | Performed by: PHYSICAL THERAPIST

## 2019-03-01 NOTE — THERAPY TREATMENT NOTE
Outpatient Physical Therapy Ortho Treatment Note  [facility]     Patient Name: Carlie Brennan  : 1960  MRN: [id number]  Today's Date: 3/1/2019      Visit Date: 2019    Visit Dx:    ICD-10-CM ICD-9-CM   1. Left shoulder pain, unspecified chronicity M25.512 719.41       Patient Active Problem List   Diagnosis   • Essential hypertension   • Gastroesophageal reflux disease without esophagitis   • Mitral valve prolapse   • Rosacea   • Hyperlipidemia   • Health care maintenance   • History of adenomatous polyp of colon   • Meniere's disease of right ear   • Polycythemia        Past Medical History:   Diagnosis Date   • GERD (gastroesophageal reflux disease)    • Hypertension    • Iron deficiency anemia 2018   • Pregnancy        • Rosacea    • Vertigo         Past Surgical History:   Procedure Laterality Date   • ABDOMINOPLASTY     •  SECTION      x 2   • COLONOSCOPY N/A 3/12/2018    Procedure: COLONOSCOPY INTO CECUM AND NORMAL TI WITH HOT SNARE POLYPECTOMY;  Surgeon: Lincoln Chino MD;  Location: [facility] ENDOSCOPY;  Service: Gastroenterology   • LASIK                         PT Assessment/Plan     Row Name 19 1436          PT Assessment    Assessment Comments  excellent scapular mobility, s/s consistent with RTC strain.   -SC        PT Plan    PT Plan Comments  progress with focus on left shoulder girdle strengthening, continue manual if indicated, possible progression with prone if tolerates.   -SC       User Key  (r) = Recorded By, (t) = Taken By, (c) = Cosigned By    Initials Name Provider Type    SC Lashonda Hernandez, PT Physical Therapist              Exercises     Row Name 19 1436             Precautions    Existing Precautions/Restrictions  no known precautions/restrictions  -SC         Subjective Comments    Subjective Comments  Doing about the same. I felt fine after I left here last visit. Was sore when you stretched me but totally fine after. hurts only  with certain movements. At rest no pain. The stretch in the door hurts. Overhead with light lifting at work increases my pain. Reaching behind is the worse. Avoiding that movement right now. I go back to the  MD next week. 03/12. I have to modifiy how I put my bra on and take shirts and coats off.   -SC         Subjective Pain    Able to rate subjective pain?  yes  -SC      Pre-Treatment Pain Level  0  -SC      Subjective Pain Comment  5/10 with movement  -SC         Total Minutes    91558 - PT Therapeutic Exercise Minutes  27  -SC      96937 - PT Manual Therapy Minutes  6  -SC         Exercise 5    Exercise Name 5  pulleys flex, ER  -SC      Reps 5  10  -SC      Time 5  5 seconds  -SC      Additional Comments  ea  -SC         Exercise 6    Exercise Name 6  lat pull down TB seated  -SC      Reps 6  15  -SC      Additional Comments  RTB  -SC         Exercise 7    Exercise Name 7  shoulder rows TB standing  -SC      Reps 7  15  -SC      Additional Comments  RTB  -SC         Exercise 8    Exercise Name 8  shoulder ext TB standing  -SC      Reps 8  15  -SC      Additional Comments  RTB  -SC         Exercise 9    Exercise Name 9  shoulder ER TB standing palms up  -SC      Reps 9  10  -SC         Exercise 10    Exercise Name 10  standing doorway stretch (arms down)  -SC      Reps 10  3  -SC      Time 10  20 seconds  -SC         Exercise 11    Exercise Name 11  wallslides flex B standing  -SC      Reps 11  10  -SC      Time 11  5 seconds  -SC         Exercise 12    Exercise Name 12  UBE  -SC      Time 12  3 minutes   -SC      Additional Comments  L2  -SC         Exercise 13    Exercise Name 13  supine PNF D2 flx TB HL  -SC      Reps 13  15  -SC      Additional Comments  RTB  -SC        User Key  (r) = Recorded By, (t) = Taken By, (c) = Cosigned By    Initials Name Provider Type    SC Lashonda Hernandez PT Physical Therapist                        Manual Rx (last 36 hours)      Manual Treatments     Row Name 03/01/19  1436             Total Minutes    51276 - PT Manual Therapy Minutes  6  -SC         Manual Rx 1    Manual Rx 1 Location  left shoulder  -SC      Manual Rx 1 Type  patient in right sidelying, scapular mobs into retraction, depression, upward rotation, scapular lift/tilt, scapular clock, patient supine with one pillow, AP and inferior joint mobs, inferior oscillations and lateral distraction. PROM left shoulder all planes  -SC      Manual Rx 1 Grade  II-IV  -SC        User Key  (r) = Recorded By, (t) = Taken By, (c) = Cosigned By    Initials Name Provider Type    Lashonda Harrison, PT Physical Therapist          PT OP Goals     Row Name 03/01/19 1347          PT Short Term Goals    STG Date to Achieve  03/21/19  -SC     STG 1  Pt will be independent and verbalized good understanding of initial HEP to improve ability to manage pain, as well as improve strength and ROM  -SC     STG 1 Progress  Progressing  -SC     STG 2  Pt will improve L shoulder flexion to at least 145 deg to improve ability to reach overhead.   -SC     STG 2 Progress  Progressing  -SC     STG 2 Progress Comments  AAROM pulleys left 149 today (right 160)  -SC        Long Term Goals    LTG Date to Achieve  04/18/19  -SC     LTG 1  Pt will be independent and verbalized good understanding of advanced HEP to improve ability to manage pain, as well as improve strength and ROM.  -SC     LTG 1 Progress  Ongoing  -SC     LTG 2  Pt will improve L shoulder flexion to at least 160 deg to improve ability to reach overhead.   -SC     LTG 2 Progress  Ongoing  -SC     LTG 3  Pt will report </=3/10 pain in L shoulder with overhead activities to improve participation in ADLs.  -SC     LTG 3 Progress  Progressing  -SC     LTG 4  Pt will improve DASH score to at least </=20% perceived disability to show overall improved quality of life.  [facility]  Ongoing  -SC        Time Calculation    PT Goal Re-Cert Due Date  04/18/19  -SC       User Key  (r) =  Recorded By, (t) = Taken By, (c) = Cosigned By    Initials Name Provider Type    SC Lashonda Hernandez, PT Physical Therapist          Therapy Education  Education Details: reviewed HEP, education of prognosis, referral to ortho if indicated (returns to w/c MD next week)  Given: HEP, Symptoms/condition management, Pain management, Mobility training, Other (comment)  Program: Reinforced  How Provided: Verbal, Demonstration  Provided to: Patient  Level of Understanding: Teach back education performed, Demonstrated, Verbalized              Time Calculation:   Start Time: 1436  Stop Time: 1509  Time Calculation (min): 33 min  Therapy Suggested Charges     Code   Minutes Charges    52503 (CPT®) Hc Pt Neuromusc Re Education Ea 15 Min      10535 (CPT®) Hc Pt Ther Proc Ea 15 Min 27 2    63052 (CPT®) Hc Gait Training Ea 15 Min      35957 (CPT®) Hc Pt Therapeutic Act Ea 15 Min      84449 (CPT®) Hc Pt Manual Therapy Ea 15 Min 6     40053 (CPT®) Hc Pt Ther Massage- Per 15 Min      64769 (CPT®) Hc Pt Iontophoresis Ea 15 Min      69470 (CPT®) Hc Pt Elec Stim Ea-Per 15 Min      08980 (CPT®) Hc Pt Ultrasound Ea 15 Min      50910 (CPT®) Hc Pt Self Care/Mgmt/Train Ea 15 Min      25810 (CPT®) Hc Pt Prosthetic (S) Train Initial Encounter, Each 15 Min      80131 (CPT®) Hc Orthotic(S) Mgmt/Train Initial Encounter, Each 15min      37612 (CPT®) Hc Pt Aquatic Therapy Ea 15 Min      75985 (CPT®) Hc Pt Orthotic(S)/Prosthetic(S) Encounter, Each 15 Min       (CPT®) Hc Pt Electrical Stim Unattended      Total  33 2        Therapy Charges for Today     Code Description Service Date Service Provider Modifiers Qty    75165184803 HC PT THER PROC EA 15 MIN 3/1/2019 Lashonda Hernandez, PT GP 2                    Lashonda Villatoro PT  3/1/2019

## 2019-03-05 ENCOUNTER — HOSPITAL ENCOUNTER (OUTPATIENT)
Dept: PHYSICAL THERAPY | Facility: HOSPITAL | Age: 59
Setting detail: THERAPIES SERIES
Discharge: HOME | End: 2019-03-05

## 2019-03-05 DIAGNOSIS — M25.512 LEFT SHOULDER PAIN, UNSPECIFIED CHRONICITY: Primary | ICD-10-CM

## 2019-03-05 PROCEDURE — 97110 THERAPEUTIC EXERCISES: CPT

## 2019-03-05 PROCEDURE — 97140 MANUAL THERAPY 1/> REGIONS: CPT

## 2019-03-05 NOTE — THERAPY TREATMENT NOTE
Outpatient Physical Therapy Ortho Treatment Note  [facility]     Patient Name: Carlie Brennan  : 1960  MRN: [id number]  Today's Date: 3/5/2019      Visit Date: 2019    Visit Dx:    ICD-10-CM ICD-9-CM   1. Left shoulder pain, unspecified chronicity M25.512 719.41       Patient Active Problem List   Diagnosis   • Essential hypertension   • Gastroesophageal reflux disease without esophagitis   • Mitral valve prolapse   • Rosacea   • Hyperlipidemia   • Health care maintenance   • History of adenomatous polyp of colon   • Meniere's disease of right ear   • Polycythemia        Past Medical History:   Diagnosis Date   • GERD (gastroesophageal reflux disease)    • Hypertension    • Iron deficiency anemia 2018   • Pregnancy        • Rosacea    • Vertigo         Past Surgical History:   Procedure Laterality Date   • ABDOMINOPLASTY     •  SECTION      x 2   • COLONOSCOPY N/A 3/12/2018    Procedure: COLONOSCOPY INTO CECUM AND NORMAL TI WITH HOT SNARE POLYPECTOMY;  Surgeon: Lincoln Chino MD;  Location: [facility] ENDOSCOPY;  Service: Gastroenterology   • LASIK                         PT Assessment/Plan     Row Name 19 9664          PT Assessment    Assessment Comments  Pt reports slow, steady improvement in symptoms and tolerating manual and exercises without increased pain. Added several strenghtening exercises met with fatigue and soreness noted at end of session. Pt to continue with current HEP as tolerated and will continue to progress with strengthening and ROM as able.   -CN        PT Plan    PT Plan Comments  Assess response to added exercises and continue to progress shoulder girdle strengthening as tolerated. Consider prone strengthening next visit.   -CN       User Key  (r) = Recorded By, (t) = Taken By, (c) = Cosigned By    Initials Name Provider Type    Sumaya Irizarry, PT Physical Therapist              Exercises     Row Name 19 1600 19 1500           Subjective Comments    Subjective Comments  It is getting better I think. Things are going well at home.   -CN  --        Subjective Pain    Able to rate subjective pain?  yes  -CN  --     Pre-Treatment Pain Level  0  -CN  --     Subjective Pain Comment  5/10 with movement  -CN  --        Total Minutes    20881 - PT Therapeutic Exercise Minutes  30  -CN  --     10219 - PT Manual Therapy Minutes  --  10  -CN        Exercise 1    Exercise Name 1  Supine B UE flexion with band at wrist  -CN  --     Cueing 1  Verbal;Demo  -CN  --     Reps 1  10  -CN  --     Additional Comments  RTB  -CN  --        Exercise 2    Exercise Name 2  SL ER  -CN  --     Cueing 2  Verbal;Tactile  -CN  --     Sets 2  2  -CN  --     Reps 2  10  -CN  --        Exercise 5    Exercise Name 5  pulleys flex, ER  -CN  --     Reps 5  10  -CN  --     Time 5  5 seconds  -CN  --     Additional Comments  ea  -CN  --        Exercise 6    Exercise Name 6  lat pull down TB seated  -CN  --     Reps 6  15  -CN  --     Additional Comments  RTB  -CN  --        Exercise 7    Exercise Name 7  shoulder rows TB standing  -CN  --     Reps 7  15  -CN  --     Additional Comments  RTB  -CN  --        Exercise 8    Exercise Name 8  shoulder ext TB standing  -CN  --     Reps 8  15  -CN  --     Additional Comments  RTB  -CN  --        Exercise 9    Exercise Name 9  shoulder ER TB standing palms up  -CN  --     Reps 9  10  -CN  --        Exercise 10    Exercise Name 10  standing doorway stretch (arms down)  -CN  --     Reps 10  3  -CN  --     Time 10  20 seconds  -CN  --        Exercise 11    Exercise Name 11  wallslides flex B standing  -CN  --     Reps 11  10  -CN  --     Time 11  5 seconds  -CN  --        Exercise 12    Exercise Name 12  UBE  -CN  --     Time 12  3 minutes   -CN  --        Exercise 13    Exercise Name 13  supine PNF D2 flx TB HL  -CN  --     Sets 13  2  -CN  --     Reps 13  10  -CN  --     Additional Comments  RTB  -CN  --        Exercise 14    Exercise  Name 14  Supine punches  -CN  --     Cueing 14  Demo  -CN  --     Reps 14  15  -CN  --       User Key  (r) = Recorded By, (t) = Taken By, (c) = Cosigned By    Initials Name Provider Type    Sumaya Irizarry, PT Physical Therapist                        Manual Rx (last 36 hours)      Manual Treatments     Row Name 03/05/19 1500             Total Minutes    80365 - PT Manual Therapy Minutes  10  -CN         Manual Rx 1    Manual Rx 1 Location  left shoulder  -CN      Manual Rx 1 Type  patient in right sidelying, scapular mobs into retraction, depression, upward rotation, scapular lift/tilt, scapular clock, patient supine with one pillow, AP and inferior joint mobs, inferior oscillations and lateral distraction. PROM left shoulder all planes  -CN      Manual Rx 1 Grade  II-IV  -CN        User Key  (r) = Recorded By, (t) = Taken By, (c) = Cosigned By    Initials Name Provider Type    Sumaya Irizarry, PT Physical Therapist          PT OP Goals     Row Name 03/05/19 1700          PT Short Term Goals    STG Date to Achieve  03/21/19  -CN     STG 1  Pt will be independent and verbalized good understanding of initial HEP to improve ability to manage pain, as well as improve strength and ROM  -CN     STG 1 Progress  Progressing  -CN     STG 1 Progress Comments  Pt compliant with current HEP.   -CN     STG 2  Pt will improve L shoulder flexion to at least 145 deg to improve ability to reach overhead.   -CN     STG 2 Progress  Progressing  -CN        Long Term Goals    LTG Date to Achieve  04/18/19  -CN     LTG 1  Pt will be independent and verbalized good understanding of advanced HEP to improve ability to manage pain, as well as improve strength and ROM.  -CN     LTG 1 Progress  Ongoing  -CN     LTG 2  Pt will improve L shoulder flexion to at least 160 deg to improve ability to reach overhead.   -CN     LTG 2 Progress  Ongoing  -CN     LTG 3  Pt will report </=3/10 pain in L shoulder with overhead  activities to improve participation in ADLs.  -CN     LTG 3 Progress  Progressing  -CN     LTG 4  Pt will improve DASH score to at least </=20% perceived disability to show overall improved quality of life.  -CN     LTG 4 Progress  Ongoing  -CN       User Key  (r) = Recorded By, (t) = Taken By, (c) = Cosigned By    Initials Name Provider Type    Sumaya Irizarry, PT Physical Therapist          Therapy Education  Given: HEP, Symptoms/condition management, Pain management, Mobility training, Other (comment)  Program: Reinforced  How Provided: Verbal, Demonstration  Provided to: Patient  Level of Understanding: Teach back education performed, Demonstrated, Verbalized              Time Calculation:   Start Time: 1615  Stop Time: 1655  Time Calculation (min): 40 min  Therapy Suggested Charges     Code   Minutes Charges    38536 (CPT®) Hc Pt Neuromusc Re Education Ea 15 Min      19602 (CPT®) Hc Pt Ther Proc Ea 15 Min 30 2    55704 (CPT®) Hc Gait Training Ea 15 Min      92684 (CPT®) Hc Pt Therapeutic Act Ea 15 Min      95545 (CPT®) Hc Pt Manual Therapy Ea 15 Min 10 1    43648 (CPT®) Hc Pt Ther Massage- Per 15 Min      00249 (CPT®) Hc Pt Iontophoresis Ea 15 Min      82994 (CPT®) Hc Pt Elec Stim Ea-Per 15 Min      52534 (CPT®) Hc Pt Ultrasound Ea 15 Min      25258 (CPT®) Hc Pt Self Care/Mgmt/Train Ea 15 Min      35420 (CPT®) Hc Pt Prosthetic (S) Train Initial Encounter, Each 15 Min      21338 (CPT®) Hc Orthotic(S) Mgmt/Train Initial Encounter, Each 15min      02350 (CPT®) Hc Pt Aquatic Therapy Ea 15 Min      48172 (CPT®) Hc Pt Orthotic(S)/Prosthetic(S) Encounter, Each 15 Min       (CPT®) Hc Pt Electrical Stim Unattended      Total  40 3        Therapy Charges for Today     Code Description Service Date Service Provider Modifiers Qty    78747830399 HC PT THER PROC EA 15 MIN 3/5/2019 Sumaya Hernandez, PT GP 2    48773351076 HC PT MANUAL THERAPY EA 15 MIN 3/5/2019 Sumaya Hernandez, PT GP 1                     Sumaya Hernandez, PT  3/5/2019

## 2019-03-07 ENCOUNTER — HOSPITAL ENCOUNTER (OUTPATIENT)
Dept: PHYSICAL THERAPY | Facility: HOSPITAL | Age: 59
Setting detail: THERAPIES SERIES
Discharge: HOME | End: 2019-03-07

## 2019-03-07 DIAGNOSIS — M25.512 LEFT SHOULDER PAIN, UNSPECIFIED CHRONICITY: Primary | ICD-10-CM

## 2019-03-07 PROCEDURE — 97140 MANUAL THERAPY 1/> REGIONS: CPT

## 2019-03-07 PROCEDURE — 97110 THERAPEUTIC EXERCISES: CPT

## 2019-03-07 NOTE — THERAPY TREATMENT NOTE
Outpatient Physical Therapy Ortho Treatment Note  [facility]     Patient Name: Carlie Brennan  : 1960  MRN: [id number]  Today's Date: 3/7/2019      Visit Date: 2019    Visit Dx:    ICD-10-CM ICD-9-CM   1. Left shoulder pain, unspecified chronicity M25.512 719.41       Patient Active Problem List   Diagnosis   • Essential hypertension   • Gastroesophageal reflux disease without esophagitis   • Mitral valve prolapse   • Rosacea   • Hyperlipidemia   • Health care maintenance   • History of adenomatous polyp of colon   • Meniere's disease of right ear   • Polycythemia        Past Medical History:   Diagnosis Date   • GERD (gastroesophageal reflux disease)    • Hypertension    • Iron deficiency anemia 2018   • Pregnancy        • Rosacea    • Vertigo         Past Surgical History:   Procedure Laterality Date   • ABDOMINOPLASTY     •  SECTION      x 2   • COLONOSCOPY N/A 3/12/2018    Procedure: COLONOSCOPY INTO CECUM AND NORMAL TI WITH HOT SNARE POLYPECTOMY;  Surgeon: Lincoln Chino MD;  Location: [facility] ENDOSCOPY;  Service: Gastroenterology   • LASIK                         PT Assessment/Plan     Row Name 19 1716          PT Assessment    Assessment Comments  Pt reports continued low level symptoms at rest and tolerating manual techniques well with only reports of discomfort at end ranges. Pt most limited in ER at this time and reports compliance with current HEP. Added several prone scapular stabilization exercises this date without reports of pain.   -CN        PT Plan    PT Plan Comments  Assess response to prone exercises and update HEP next visit.   -CN       User Key  (r) = Recorded By, (t) = Taken By, (c) = Cosigned By    Initials Name Provider Type    Sumaya Irizarry, PT Physical Therapist              Exercises     Row Name 19 1500             Subjective Comments    Subjective Comments  It's not too bad today. It felt ok after last time.   -CN          Subjective Pain    Able to rate subjective pain?  yes  -CN      Pre-Treatment Pain Level  1  -CN         Total Minutes    40058 - PT Therapeutic Exercise Minutes  30  -CN      29208 - PT Manual Therapy Minutes  10  -CN         Exercise 1    Exercise Name 1  Supine B UE flexion with band at wrist  -CN      Cueing 1  Verbal;Demo  -CN      Reps 1  10  -CN      Additional Comments  RTB  -CN         Exercise 2    Exercise Name 2  SL ER  -CN      Cueing 2  Verbal;Tactile  -CN      Sets 2  2  -CN      Reps 2  10  -CN         Exercise 5    Exercise Name 5  pulleys flex, ER  -CN      Reps 5  10  -CN      Time 5  5 seconds  -CN      Additional Comments  ea  -CN         Exercise 7    Exercise Name 7  shoulder rows TB standing  -CN      Reps 7  15  -CN      Additional Comments  RTB  -CN         Exercise 8    Exercise Name 8  shoulder ext TB standing  -CN      Reps 8  15  -CN      Additional Comments  RTB  -CN         Exercise 9    Exercise Name 9  shoulder ER TB standing palms up  -CN      Reps 9  10  -CN         Exercise 11    Exercise Name 11  wallslides flex B standing  -CN      Reps 11  10  -CN      Time 11  5 seconds  -CN         Exercise 12    Exercise Name 12  UBE  -CN      Time 12  4 min  -CN         Exercise 13    Exercise Name 13  supine PNF D2 flx TB HL  -CN      Sets 13  2  -CN      Reps 13  10  -CN      Additional Comments  RTB  -CN         Exercise 14    Exercise Name 14  Supine punches  -CN      Cueing 14  Demo  -CN      Reps 14  15  -CN      Additional Comments  2#  -CN         Exercise 15    Exercise Name 15  Prone Ts, rows and extension  -CN      Cueing 15  Verbal;Tactile  -CN      Reps 15  10 ea  -CN        User Key  (r) = Recorded By, (t) = Taken By, (c) = Cosigned By    Initials Name Provider Type    Sumaya Irizarry, PT Physical Therapist                        Manual Rx (last 36 hours)      Manual Treatments     Row Name 03/07/19 1500             Total Minutes    87558 - PT Manual Therapy  Minutes  10  -CN         Manual Rx 1    Manual Rx 1 Location  left shoulder  -CN      Manual Rx 1 Type  PROM all planes, gentle post and inf joint mobs  -CN        User Key  (r) = Recorded By, (t) = Taken By, (c) = Cosigned By    Initials Name Provider Type    Sumaya Irizarry, PT Physical Therapist              Therapy Education  Given: HEP, Symptoms/condition management, Pain management, Mobility training, Other (comment)  Program: Reinforced  How Provided: Verbal, Demonstration  Provided to: Patient  Level of Understanding: Teach back education performed, Demonstrated, Verbalized              Time Calculation:   Start Time: 1530  Stop Time: 1610  Time Calculation (min): 40 min  Therapy Suggested Charges     Code   Minutes Charges    04773 (CPT®) Hc Pt Neuromusc Re Education Ea 15 Min      86637 (CPT®) Hc Pt Ther Proc Ea 15 Min 30 2    73456 (CPT®) Hc Gait Training Ea 15 Min      06744 (CPT®) Hc Pt Therapeutic Act Ea 15 Min      66813 (CPT®) Hc Pt Manual Therapy Ea 15 Min 10 1    41529 (CPT®) Hc Pt Ther Massage- Per 15 Min      05131 (CPT®) Hc Pt Iontophoresis Ea 15 Min      34608 (CPT®) Hc Pt Elec Stim Ea-Per 15 Min      43233 (CPT®) Hc Pt Ultrasound Ea 15 Min      52616 (CPT®) Hc Pt Self Care/Mgmt/Train Ea 15 Min      96895 (CPT®) Hc Pt Prosthetic (S) Train Initial Encounter, Each 15 Min      60304 (CPT®) Hc Orthotic(S) Mgmt/Train Initial Encounter, Each 15min      78583 (CPT®) Hc Pt Aquatic Therapy Ea 15 Min      12446 (CPT®) Hc Pt Orthotic(S)/Prosthetic(S) Encounter, Each 15 Min       (CPT®) Hc Pt Electrical Stim Unattended      Total  40 3        Therapy Charges for Today     Code Description Service Date Service Provider Modifiers Qty    98083859712 HC PT THER PROC EA 15 MIN 3/7/2019 Sumaya Hernandez, PT GP 2    01048295257 HC PT MANUAL THERAPY EA 15 MIN 3/7/2019 Smuaya Hernandez, PT GP 1                    Sumaya Hernandez PT  3/7/2019

## 2019-03-11 ENCOUNTER — HOSPITAL ENCOUNTER (OUTPATIENT)
Dept: PHYSICAL THERAPY | Facility: HOSPITAL | Age: 59
Setting detail: THERAPIES SERIES
Discharge: HOME | End: 2019-03-11

## 2019-03-11 DIAGNOSIS — M25.512 LEFT SHOULDER PAIN, UNSPECIFIED CHRONICITY: Primary | ICD-10-CM

## 2019-03-11 PROCEDURE — 97110 THERAPEUTIC EXERCISES: CPT

## 2019-03-11 PROCEDURE — 97140 MANUAL THERAPY 1/> REGIONS: CPT

## 2019-03-11 NOTE — THERAPY TREATMENT NOTE
Outpatient Physical Therapy Ortho Treatment Note  [facility]     Patient Name: Carlie Brennan  : 1960  MRN: [id number]  Today's Date: 3/11/2019      Visit Date: 2019    Visit Dx:    ICD-10-CM ICD-9-CM   1. Left shoulder pain, unspecified chronicity M25.512 719.41       Patient Active Problem List   Diagnosis   • Essential hypertension   • Gastroesophageal reflux disease without esophagitis   • Mitral valve prolapse   • Rosacea   • Hyperlipidemia   • Health care maintenance   • History of adenomatous polyp of colon   • Meniere's disease of right ear   • Polycythemia        Past Medical History:   Diagnosis Date   • GERD (gastroesophageal reflux disease)    • Hypertension    • Iron deficiency anemia 2018   • Pregnancy        • Rosacea    • Vertigo         Past Surgical History:   Procedure Laterality Date   • ABDOMINOPLASTY     •  SECTION      x 2   • COLONOSCOPY N/A 3/12/2018    Procedure: COLONOSCOPY INTO CECUM AND NORMAL TI WITH HOT SNARE POLYPECTOMY;  Surgeon: Lincoln Chino MD;  Location: [facility] ENDOSCOPY;  Service: Gastroenterology   • LASIK                         PT Assessment/Plan     Row Name 19 1628          PT Assessment    Assessment Comments  Pt reporting only soreness after last visit and decresed consistency of pain with movements. Pt able to tolerate progressed weights and resistance today without exacerbation of symptoms and demonstrates improved posture and decreased cues required. Pt continues to be limited with work tasks as well as ADLs and would benefit from skilled PT to address the deficits and return to PLOF.   -CN        PT Plan    PT Plan Comments  Continue to progress exercises as tolerated. Consider scapular star and begin to address functional impairments as able.   -CN       User Key  (r) = Recorded By, (t) = Taken By, (c) = Cosigned By    Initials Name Provider Type    Sumaya Irizarry, PT Physical Therapist               Exercises     Row Name 03/11/19 1500             Subjective Comments    Subjective Comments  It felt ok after last time, no increased pain.   -CN         Subjective Pain    Able to rate subjective pain?  yes  -CN      Pre-Treatment Pain Level  0  -CN         Total Minutes    86901 - PT Therapeutic Exercise Minutes  30  -CN      78002 - PT Manual Therapy Minutes  10  -CN         Exercise 1    Exercise Name 1  Supine B UE flexion with band at wrist  -CN      Cueing 1  Verbal;Demo  -CN      Reps 1  15  -CN      Additional Comments  RTB  -CN         Exercise 2    Exercise Name 2  SL ER  -CN      Cueing 2  Verbal;Tactile  -CN      Sets 2  2  -CN      Reps 2  10  -CN      Additional Comments  1#  -CN         Exercise 5    Exercise Name 5  pulleys flex, ER  -CN      Reps 5  10  -CN      Time 5  5 seconds  -CN      Additional Comments  ea  -CN         Exercise 6    Exercise Name 6  lat pull down TB seated  -CN      Reps 6  15  -CN      Additional Comments  GTB  -CN         Exercise 7    Exercise Name 7  shoulder rows TB standing  -CN      Reps 7  15  -CN      Additional Comments  GTB  -CN         Exercise 8    Exercise Name 8  shoulder ext TB standing  -CN      Reps 8  15  -CN      Additional Comments  GTB  -CN         Exercise 9    Exercise Name 9  shoulder ER TB standing palms up  -CN      Reps 9  15  -CN      Additional Comments  RTB  -CN         Exercise 11    Exercise Name 11  wallslides flex B standing  -CN      Reps 11  10  -CN      Time 11  5 seconds  -CN         Exercise 12    Exercise Name 12  UBE  -CN      Time 12  4 min  -CN         Exercise 13    Exercise Name 13  supine PNF D2 flx TB HL  -CN      Sets 13  2  -CN      Reps 13  10  -CN      Additional Comments  GTB  -CN         Exercise 14    Exercise Name 14  Supine punches  -CN      Cueing 14  Demo  -CN      Reps 14  15  -CN      Additional Comments  2#  -CN         Exercise 15    Exercise Name 15  Prone Ts, rows and extension  -CN      Cueing 15   Verbal;Tactile  -CN      Reps 15  10 ea  -CN      Additional Comments  2# for rows and ext  -CN        User Key  (r) = Recorded By, (t) = Taken By, (c) = Cosigned By    Initials Name Provider Type    Sumaya Irizarry, PT Physical Therapist                        Manual Rx (last 36 hours)      Manual Treatments     Row Name 03/11/19 1500             Total Minutes    84132 - PT Manual Therapy Minutes  10  -CN         Manual Rx 1    Manual Rx 1 Location  left shoulder  -CN      Manual Rx 1 Type  PROM all planes, gentle post and inf joint mobs  -CN        User Key  (r) = Recorded By, (t) = Taken By, (c) = Cosigned By    Initials Name Provider Type    Sumaya Irizarry, PT Physical Therapist          PT OP Goals     Row Name 03/11/19 1600          PT Short Term Goals    STG Date to Achieve  03/21/19  -CN     STG 1  Pt will be independent and verbalized good understanding of initial HEP to improve ability to manage pain, as well as improve strength and ROM  -CN     STG 1 Progress  Met  -CN     STG 2  Pt will improve L shoulder flexion to at least 145 deg to improve ability to reach overhead.   -CN     STG 2 Progress  Progressing  -CN        Long Term Goals    LTG Date to Achieve  04/18/19  -CN     LTG 1  Pt will be independent and verbalized good understanding of advanced HEP to improve ability to manage pain, as well as improve strength and ROM.  -CN     LTG 1 Progress  Ongoing  -CN     LTG 2  Pt will improve L shoulder flexion to at least 160 deg to improve ability to reach overhead.   -CN     LTG 2 Progress  Ongoing  -CN     LTG 3  Pt will report </=3/10 pain in L shoulder with overhead activities to improve participation in ADLs.  -CN     LTG 3 Progress  Progressing  -CN     LTG 4  Pt will improve DASH score to at least </=20% perceived disability to show overall improved quality of life.  -CN     LTG 4 Progress  Ongoing  -CN       User Key  (r) = Recorded By, (t) = Taken By, (c) = Cosigned By     Initials Name Provider Type    Sumaya Irizarry, PT Physical Therapist          Therapy Education  Given: HEP, Symptoms/condition management, Pain management, Mobility training, Other (comment)  Program: Progressed  How Provided: Verbal, Demonstration  Provided to: Patient  Level of Understanding: Teach back education performed, Demonstrated, Verbalized              Time Calculation:   Start Time: 1540  Stop Time: 1620  Time Calculation (min): 40 min  Therapy Suggested Charges     Code   Minutes Charges    90706 (CPT®) Hc Pt Neuromusc Re Education Ea 15 Min      59998 (CPT®) Hc Pt Ther Proc Ea 15 Min 30 2    58457 (CPT®) Hc Gait Training Ea 15 Min      80516 (CPT®) Hc Pt Therapeutic Act Ea 15 Min      85828 (CPT®) Hc Pt Manual Therapy Ea 15 Min 10 1    82921 (CPT®) Hc Pt Ther Massage- Per 15 Min      95005 (CPT®) Hc Pt Iontophoresis Ea 15 Min      36347 (CPT®) Hc Pt Elec Stim Ea-Per 15 Min      18112 (CPT®) Hc Pt Ultrasound Ea 15 Min      63917 (CPT®) Hc Pt Self Care/Mgmt/Train Ea 15 Min      60240 (CPT®) Hc Pt Prosthetic (S) Train Initial Encounter, Each 15 Min      66444 (CPT®) Hc Orthotic(S) Mgmt/Train Initial Encounter, Each 15min      02309 (CPT®) Hc Pt Aquatic Therapy Ea 15 Min      64234 (CPT®) Hc Pt Orthotic(S)/Prosthetic(S) Encounter, Each 15 Min       (CPT®) Hc Pt Electrical Stim Unattended      Total  40 3        Therapy Charges for Today     Code Description Service Date Service Provider Modifiers Qty    25205956690 HC PT THER PROC EA 15 MIN 3/11/2019 Sumaya Hernandez, PT GP 2    11905463467 HC PT MANUAL THERAPY EA 15 MIN 3/11/2019 Sumaya Hernandez, PT GP 1                    Sumaya Hernandez PT  3/11/2019

## 2019-03-14 ENCOUNTER — HOSPITAL ENCOUNTER (OUTPATIENT)
Dept: PHYSICAL THERAPY | Facility: HOSPITAL | Age: 59
Setting detail: THERAPIES SERIES
Discharge: HOME | End: 2019-03-14

## 2019-03-14 DIAGNOSIS — M25.512 LEFT SHOULDER PAIN, UNSPECIFIED CHRONICITY: Primary | ICD-10-CM

## 2019-03-14 PROCEDURE — 97140 MANUAL THERAPY 1/> REGIONS: CPT

## 2019-03-14 PROCEDURE — 97110 THERAPEUTIC EXERCISES: CPT

## 2019-03-14 NOTE — THERAPY TREATMENT NOTE
Outpatient Physical Therapy Ortho Treatment Note  [facility]     Patient Name: Carlie Brennan  : 1960  MRN: [id number]  Today's Date: 3/14/2019      Visit Date: 2019    Visit Dx:    ICD-10-CM ICD-9-CM   1. Left shoulder pain, unspecified chronicity M25.512 719.41       Patient Active Problem List   Diagnosis   • Essential hypertension   • Gastroesophageal reflux disease without esophagitis   • Mitral valve prolapse   • Rosacea   • Hyperlipidemia   • Health care maintenance   • History of adenomatous polyp of colon   • Meniere's disease of right ear   • Polycythemia        Past Medical History:   Diagnosis Date   • GERD (gastroesophageal reflux disease)    • Hypertension    • Iron deficiency anemia 2018   • Pregnancy        • Rosacea    • Vertigo         Past Surgical History:   Procedure Laterality Date   • ABDOMINOPLASTY     •  SECTION      x 2   • COLONOSCOPY N/A 3/12/2018    Procedure: COLONOSCOPY INTO CECUM AND NORMAL TI WITH HOT SNARE POLYPECTOMY;  Surgeon: Lincoln Chino MD;  Location: [facility] ENDOSCOPY;  Service: Gastroenterology   • LASIK                         PT Assessment/Plan     Row Name 19 4853          PT Assessment    Assessment Comments  Pt reports slight increase in soreness last visit, however states she has been using it more with ADLs and work tasks. Pt continues with painful and limited ROM, however able to progress strengthening exercises with only mild reports of pain. Pt with poor scapular strength leading to decreased stability with UE movement.   -CN        PT Plan    PT Plan Comments  Continue with scapular and shoulder girdle strengthening as tolerated.   -CN       User Key  (r) = Recorded By, (t) = Taken By, (c) = Cosigned By    Initials Name Provider Type    Sumaya Irizarry, PT Physical Therapist              Exercises     Row Name 19 1500             Subjective Comments    Subjective Comments  It has been sore this  week. I haven't been babying it as much and I think thats why.   -CN         Subjective Pain    Able to rate subjective pain?  yes  -CN      Pre-Treatment Pain Level  3  -CN         Total Minutes    28391 - PT Therapeutic Exercise Minutes  30  -CN      11199 - PT Manual Therapy Minutes  10  -CN         Exercise 1    Exercise Name 1  Supine B UE flexion with band at wrist  -CN      Cueing 1  Verbal;Demo  -CN      Reps 1  15  -CN      Additional Comments  RTB  -CN         Exercise 2    Exercise Name 2  SL ER  -CN      Cueing 2  Verbal;Tactile  -CN      Sets 2  2  -CN      Reps 2  10  -CN      Additional Comments  1#  -CN         Exercise 3    Exercise Name 3  SL abduction  -CN      Cueing 3  Demo  -CN      Reps 3  10  -CN      Additional Comments  cues to retract scapula  -CN         Exercise 4    Exercise Name 4  SL scapular clock  -CN      Cueing 4  Demo  -CN      Reps 4  10 ea  -CN         Exercise 5    Exercise Name 5  pulleys flex, ER  -CN      Reps 5  10  -CN      Time 5  5 seconds  -CN      Additional Comments  ea  -CN         Exercise 6    Exercise Name 6  lat pull down TB seated  -CN      Reps 6  15  -CN      Additional Comments  GTB  -CN         Exercise 7    Exercise Name 7  shoulder rows TB standing  -CN      Sets 7  2  -CN      Reps 7  10  -CN      Additional Comments  GTB  -CN         Exercise 8    Exercise Name 8  shoulder ext TB standing  -CN      Sets 8  2  -CN      Reps 8  10  -CN      Additional Comments  GTB  -CN         Exercise 9    Exercise Name 9  shoulder ER TB standing palms up  -CN      Reps 9  15  -CN      Additional Comments  RTB  -CN         Exercise 11    Exercise Name 11  wallslides flex B standing  -CN      Reps 11  10  -CN      Time 11  5 seconds  -CN         Exercise 12    Exercise Name 12  UBE  -CN      Time 12  4 min  -CN         Exercise 13    Exercise Name 13  supine PNF D2 flx TB HL  -CN      Sets 13  2  -CN      Reps 13  10  -CN      Additional Comments  GTB  -CN          Exercise 14    Exercise Name 14  Supine punches  -CN      Cueing 14  Demo  -CN      Reps 14  15  -CN      Additional Comments  3#  -CN         Exercise 16    Exercise Name 16  Supine star  -CN      Cueing 16  Demo  -CN      Reps 16  10 ea  -CN      Additional Comments  RTB  -CN        User Key  (r) = Recorded By, (t) = Taken By, (c) = Cosigned By    Initials Name Provider Type    Sumaya Irizarry, IMANI Physical Therapist                        Manual Rx (last 36 hours)      Manual Treatments     Row Name 03/14/19 1500             Total Minutes    01481 - PT Manual Therapy Minutes  10  -CN         Manual Rx 1    Manual Rx 1 Location  left shoulder  -CN      Manual Rx 1 Type  PROM all planes, gentle post and inf joint mobs  -CN        User Key  (r) = Recorded By, (t) = Taken By, (c) = Cosigned By    Initials Name Provider Type    Sumaya Irizarry, IMANI Physical Therapist              Therapy Education  Given: HEP, Symptoms/condition management, Pain management, Mobility training, Other (comment)  Program: Progressed  How Provided: Verbal, Demonstration  Provided to: Patient  Level of Understanding: Teach back education performed, Demonstrated, Verbalized              Time Calculation:   Start Time: 1530  Stop Time: 1610  Time Calculation (min): 40 min  Therapy Suggested Charges     Code   Minutes Charges    77681 (CPT®) Hc Pt Neuromusc Re Education Ea 15 Min      30352 (CPT®) Hc Pt Ther Proc Ea 15 Min 30 2    60173 (CPT®) Hc Gait Training Ea 15 Min      26287 (CPT®) Hc Pt Therapeutic Act Ea 15 Min      70063 (CPT®) Hc Pt Manual Therapy Ea 15 Min 10 1    70325 (CPT®) Hc Pt Ther Massage- Per 15 Min      22680 (CPT®) Hc Pt Iontophoresis Ea 15 Min      29465 (CPT®) Hc Pt Elec Stim Ea-Per 15 Min      85971 (CPT®) Hc Pt Ultrasound Ea 15 Min      89211 (CPT®) Hc Pt Self Care/Mgmt/Train Ea 15 Min      59729 (CPT®) Hc Pt Prosthetic (S) Train Initial Encounter, Each 15 Min      00230 (CPT®) Hc Orthotic(S)  Mgmt/Train Initial Encounter, Each 15min      04644 (CPT®) Hc Pt Aquatic Therapy Ea 15 Min      14665 (CPT®) Hc Pt Orthotic(S)/Prosthetic(S) Encounter, Each 15 Min       (CPT®) Hc Pt Electrical Stim Unattended      Total  40 3        Therapy Charges for Today     Code Description Service Date Service Provider Modifiers Qty    51353569132 HC PT THER PROC EA 15 MIN 3/14/2019 Sumaya Hernandez, PT GP 2    11118953879 HC PT MANUAL THERAPY EA 15 MIN 3/14/2019 Sumaya Hernandez, PT GP 1                    Sumaya Hernandez, PT  3/14/2019

## 2019-03-18 ENCOUNTER — HOSPITAL ENCOUNTER (OUTPATIENT)
Dept: PHYSICAL THERAPY | Facility: HOSPITAL | Age: 59
Setting detail: THERAPIES SERIES
Discharge: HOME | End: 2019-03-18

## 2019-03-18 DIAGNOSIS — M25.512 LEFT SHOULDER PAIN, UNSPECIFIED CHRONICITY: Primary | ICD-10-CM

## 2019-03-18 PROCEDURE — 97110 THERAPEUTIC EXERCISES: CPT

## 2019-03-18 PROCEDURE — 97140 MANUAL THERAPY 1/> REGIONS: CPT

## 2019-03-21 ENCOUNTER — HOSPITAL ENCOUNTER (OUTPATIENT)
Dept: PHYSICAL THERAPY | Facility: HOSPITAL | Age: 59
Setting detail: THERAPIES SERIES
Discharge: HOME | End: 2019-03-21

## 2019-03-21 DIAGNOSIS — M25.512 LEFT SHOULDER PAIN, UNSPECIFIED CHRONICITY: Primary | ICD-10-CM

## 2019-03-21 PROCEDURE — 97140 MANUAL THERAPY 1/> REGIONS: CPT

## 2019-03-21 PROCEDURE — 97110 THERAPEUTIC EXERCISES: CPT

## 2019-03-21 NOTE — THERAPY TREATMENT NOTE
Outpatient Physical Therapy Ortho Treatment Note  [facility]     Patient Name: Carlie Brennan  : 1960  MRN: [id number]  Today's Date: 3/21/2019      Visit Date: 2019    Visit Dx:    ICD-10-CM ICD-9-CM   1. Left shoulder pain, unspecified chronicity M25.512 719.41       Patient Active Problem List   Diagnosis   • Essential hypertension   • Gastroesophageal reflux disease without esophagitis   • Mitral valve prolapse   • Rosacea   • Hyperlipidemia   • Health care maintenance   • History of adenomatous polyp of colon   • Meniere's disease of right ear   • Polycythemia        Past Medical History:   Diagnosis Date   • GERD (gastroesophageal reflux disease)    • Hypertension    • Iron deficiency anemia 2018   • Pregnancy        • Rosacea    • Vertigo         Past Surgical History:   Procedure Laterality Date   • ABDOMINOPLASTY     •  SECTION      x 2   • COLONOSCOPY N/A 3/12/2018    Procedure: COLONOSCOPY INTO CECUM AND NORMAL TI WITH HOT SNARE POLYPECTOMY;  Surgeon: Lincoln Chino MD;  Location: [facility] ENDOSCOPY;  Service: Gastroenterology   • LASIK                         PT Assessment/Plan     Row Name 19 1616          PT Assessment    Assessment Comments  Pt reports no pain this date and continued to progress shoulder girdle and scapular strenghtening. Pt tolerated increased weights/resistance today with cues for scap retraction and dec UT activation at times. Added prone Ys, Ts and Ws which pt was able to perform in small range, however much difficulty noted raising against gravity.   -CN        PT Plan    PT Plan Comments  Assess response to progressed weights/resistance and continue to work on scapular strengthening to improve ROM with functional activities.   -CN       User Key  (r) = Recorded By, (t) = Taken By, (c) = Cosigned By    Initials Name Provider Type    Sumaya Irizarry, PT Physical Therapist            Exercises     Row Name 19 0374              Subjective Comments    Subjective Comments  It feels ok today. Nothing new going on.   -CN         Subjective Pain    Able to rate subjective pain?  yes  -CN      Pre-Treatment Pain Level  0  -CN         Total Minutes    68261 - PT Therapeutic Exercise Minutes  25  -CN      99408 - PT Manual Therapy Minutes  15  -CN         Exercise 1    Exercise Name 1  Supine B UE flexion with band at wrist  -CN      Cueing 1  Verbal;Demo  -CN      Reps 1  15  -CN      Additional Comments  GTB  -CN         Exercise 2    Exercise Name 2  SL ER  -CN      Cueing 2  Verbal;Tactile  -CN      Sets 2  2  -CN      Reps 2  10  -CN      Additional Comments  2#  -CN         Exercise 3    Exercise Name 3  SL abduction  -CN      Cueing 3  Demo  -CN      Reps 3  10  -CN      Additional Comments  cues for scap retraction  -CN         Exercise 4    Exercise Name 4  SL scapular clock  -CN      Cueing 4  Demo  -CN      Reps 4  10 ea  -CN         Exercise 5    Exercise Name 5  pulleys flex, ER  -CN      Reps 5  10  -CN      Time 5  5 seconds  -CN      Additional Comments  ea  -CN         Exercise 7    Exercise Name 7  shoulder rows TB standing  -CN      Sets 7  2  -CN      Reps 7  10  -CN      Additional Comments  BTB  -CN         Exercise 8    Exercise Name 8  shoulder ext TB standing  -CN      Sets 8  2  -CN      Reps 8  10  -CN      Additional Comments  BTB  -CN         Exercise 10    Exercise Name 10  Prone Ys, Ts, Ws  -CN      Cueing 10  Demo;Verbal;Tactile  -CN      Reps 10  10 ea  -CN         Exercise 11    Exercise Name 11  wallslides flex B standing  -CN      Reps 11  10  -CN      Time 11  5 seconds  -CN         Exercise 12    Exercise Name 12  UBE  -CN      Time 12  4 min  -CN         Exercise 13    Exercise Name 13  supine PNF D2 flx TB HL  -CN      Sets 13  2  -CN      Reps 13  10  -CN      Additional Comments  GTB  -CN         Exercise 14    Exercise Name 14  Supine punches  -CN      Cueing 14  Demo  -CN      Reps 14  15  -CN       Additional Comments  4#  -CN         Exercise 16    Exercise Name 16  Supine star  -CN      Cueing 16  Demo  -CN      Reps 16  10 ea  -CN      Additional Comments  GTB  -CN         Exercise 17    Exercise Name 17  Supine small circles  -CN      Cueing 17  Demo  -CN      Reps 17  10 CW and CCW  -CN      Additional Comments  2#  -CN        User Key  (r) = Recorded By, (t) = Taken By, (c) = Cosigned By    Initials Name Provider Type    Sumaya Irizarry, IMANI Physical Therapist                      Manual Rx (last 36 hours)      Manual Treatments     Row Name 03/21/19 1500             Total Minutes    91366 - PT Manual Therapy Minutes  15  -CN         Manual Rx 1    Manual Rx 1 Location  left shoulder  -CN      Manual Rx 1 Type  PROM all planes, gentle post and inf joint mobs  -CN         Manual Rx 2    Manual Rx 2 Location  D2 PNF patterns  -CN        User Key  (r) = Recorded By, (t) = Taken By, (c) = Cosigned By    Initials Name Provider Type    Sumaya Irizarry PT Physical Therapist              Therapy Education  Given: HEP, Symptoms/condition management, Pain management, Mobility training, Other (comment)  Program: Progressed  How Provided: Verbal, Demonstration  Provided to: Patient  Level of Understanding: Teach back education performed, Demonstrated, Verbalized              Time Calculation:   Start Time: 1537  Stop Time: 1617  Time Calculation (min): 40 min  Therapy Charges for Today     Code Description Service Date Service Provider Modifiers Qty    94168695463  PT THER PROC EA 15 MIN 3/21/2019 Sumaya Hernandez, PT GP 2    [id number]  PT MANUAL THERAPY EA 15 MIN 3/21/2019 Sumaya Hernandez, PT GP 1                    Sumaya Hernandez PT  3/21/2019

## 2019-03-29 ENCOUNTER — OFFICE VISIT (OUTPATIENT)
Dept: INTERNAL MEDICINE | Facility: CLINIC | Age: 59
End: 2019-03-29

## 2019-03-29 VITALS
DIASTOLIC BLOOD PRESSURE: 78 MMHG | HEIGHT: 65 IN | BODY MASS INDEX: 27.66 KG/M2 | SYSTOLIC BLOOD PRESSURE: 118 MMHG | RESPIRATION RATE: 14 BRPM | WEIGHT: 166 LBS

## 2019-03-29 DIAGNOSIS — H81.01 MENIERE'S DISEASE OF RIGHT EAR: ICD-10-CM

## 2019-03-29 DIAGNOSIS — Z00.00 HEALTH CARE MAINTENANCE: ICD-10-CM

## 2019-03-29 DIAGNOSIS — Z23 NEED FOR SHINGLES VACCINE: ICD-10-CM

## 2019-03-29 DIAGNOSIS — I10 ESSENTIAL HYPERTENSION: Primary | ICD-10-CM

## 2019-03-29 DIAGNOSIS — Z23 NEED FOR TETANUS BOOSTER: ICD-10-CM

## 2019-03-29 PROBLEM — D75.1 POLYCYTHEMIA: Status: RESOLVED | Noted: 2018-08-30 | Resolved: 2019-03-29

## 2019-03-29 PROCEDURE — 99213 OFFICE O/P EST LOW 20 MIN: CPT | Performed by: INTERNAL MEDICINE

## 2019-03-29 PROCEDURE — 90472 IMMUNIZATION ADMIN EACH ADD: CPT | Performed by: INTERNAL MEDICINE

## 2019-03-29 PROCEDURE — 90471 IMMUNIZATION ADMIN: CPT | Performed by: INTERNAL MEDICINE

## 2019-03-29 PROCEDURE — 90715 TDAP VACCINE 7 YRS/> IM: CPT | Performed by: INTERNAL MEDICINE

## 2019-03-29 PROCEDURE — 90750 HZV VACC RECOMBINANT IM: CPT | Performed by: INTERNAL MEDICINE

## 2019-03-29 NOTE — PROGRESS NOTES
"Subjective   Carlie Brennan is a 58 y.o. female.     History of Present Illness     /78 (BP Location: Left arm, Patient Position: Sitting, Cuff Size: Adult)   Resp 14   Ht 165.1 cm (65\")   Wt 75.3 kg (166 lb)   BMI 27.62 kg/m²     Current Outpatient Medications:   •  meclizine (ANTIVERT) 25 MG tablet, Take 1 tablet by mouth 3 (Three) Times a Day As Needed for dizziness., Disp: 15 tablet, Rfl: 1  •  metroNIDAZOLE (METROGEL) 1 % gel, Apply  topically to the appropriate area on face  as directed., Disp: 60 g, Rfl: PRN  •  naproxen (NAPROSYN) 500 MG tablet, Take 1 tablet by mouth 2 (Two) Times a Day With Meals., Disp: 60 tablet, Rfl: 0  •  omeprazole (PriLOSEC) 20 MG capsule, Take 20 mg by mouth daily as needed., Disp: , Rfl:   •  triamterene-hydrochlorothiazide (MAXZIDE-25) 37.5-25 MG per tablet, Take 1 tablet by mouth Daily., Disp: 30 tablet, Rfl: 11    Patient has long-standing benign essential HTN.  BP is usually well controlled with daily use of thiazide diuretic. On low salt diet with good compliance. Takes medication regularly. Denies chest pain, dyspnea,lightheadedness,  lower extremity edema. Patient does not check blood pressure    Patient has been diagnosed with Meniere's disease. Patient is on low salt diet with good compliance. Patient is compliant with treatment: daily use of diuretic. Side effects of medication: none. Had only one very mild episode since we had changed her medication.      The following portions of the patient's history were reviewed and updated as appropriate: allergies, current medications, past family history, past medical history, past social history, past surgical history and problem list.    Review of Systems   Constitutional: Negative for chills and fever.   Eyes: Negative for pain and redness.   Respiratory: Negative for cough and shortness of breath.    Cardiovascular: Negative for chest pain and leg swelling.   Neurological: Negative for dizziness and headaches. "       Objective   Physical Exam   Constitutional: She is oriented to person, place, and time. She appears well-developed and well-nourished.   HENT:   Head: Normocephalic and atraumatic.   Right Ear: Tympanic membrane, external ear and ear canal normal.   Left Ear: Tympanic membrane, external ear and ear canal normal.   Nose: Nose normal. Right sinus exhibits no maxillary sinus tenderness and no frontal sinus tenderness. Left sinus exhibits no maxillary sinus tenderness and no frontal sinus tenderness.   Mouth/Throat: Uvula is midline, oropharynx is clear and moist and mucous membranes are normal.   Eyes: Conjunctivae and EOM are normal. Pupils are equal, round, and reactive to light. Right eye exhibits no discharge. Left eye exhibits no discharge. No scleral icterus.   Neck: Neck supple. No JVD present.   Cardiovascular: Normal rate, regular rhythm and normal heart sounds. Exam reveals no gallop and no friction rub.   No murmur heard.  Pulmonary/Chest: Effort normal and breath sounds normal. She has no wheezes. She has no rales.   Musculoskeletal: She exhibits no edema.   Lymphadenopathy:     She has no cervical adenopathy.   Neurological: She is alert and oriented to person, place, and time. No cranial nerve deficit.   Skin: Skin is warm and dry. No rash noted.   Psychiatric: She has a normal mood and affect. Her behavior is normal.   Vitals reviewed.      Assessment/Plan   Carlie was seen today for hypertension.    Diagnoses and all orders for this visit:    Essential hypertension    Meniere's disease of right ear    Health care maintenance  -     CBC & Differential; Future  -     Comprehensive Metabolic Panel; Future  -     Lipid Panel; Future  -     TSH; Future    Need for tetanus booster  -     Tdap Vaccine Greater Than or Equal To 8yo IM    Need for shingles vaccine  -     Shingrix Vaccine      HTN - well controlled with current medication regimen. Normal kidney tests and electrolytes. Recommended low salt  diet. Continue same medical treatment.  Meniere's disease - well controlled with low salt diet and current medication. Will continue same.

## 2019-04-03 ENCOUNTER — HOSPITAL ENCOUNTER (OUTPATIENT)
Dept: PHYSICAL THERAPY | Facility: HOSPITAL | Age: 59
Setting detail: THERAPIES SERIES
Discharge: HOME | End: 2019-04-03

## 2019-04-03 DIAGNOSIS — M25.512 LEFT SHOULDER PAIN, UNSPECIFIED CHRONICITY: Primary | ICD-10-CM

## 2019-04-03 PROCEDURE — 97110 THERAPEUTIC EXERCISES: CPT

## 2019-04-03 PROCEDURE — 97140 MANUAL THERAPY 1/> REGIONS: CPT

## 2019-04-03 NOTE — THERAPY PROGRESS REPORT/RE-CERT
Outpatient Physical Therapy Ortho Re-Assessment  [facility]     Patient Name: Carlie Brennan  : 1960  MRN: [id number]  Today's Date: 4/3/2019      Visit Date: 2019    Patient Active Problem List   Diagnosis   • Essential hypertension   • Gastroesophageal reflux disease without esophagitis   • Mitral valve prolapse   • Rosacea   • Hyperlipidemia   • Health care maintenance   • History of adenomatous polyp of colon   • Meniere's disease of right ear        Past Medical History:   Diagnosis Date   • GERD (gastroesophageal reflux disease)    • Hypertension    • Iron deficiency anemia 2018   • Pregnancy        • Rosacea    • Vertigo         Past Surgical History:   Procedure Laterality Date   • ABDOMINOPLASTY     •  SECTION      x 2   • COLONOSCOPY N/A 3/12/2018    Procedure: COLONOSCOPY INTO CECUM AND NORMAL TI WITH HOT SNARE POLYPECTOMY;  Surgeon: Lincoln Chino MD;  Location: [facility] ENDOSCOPY;  Service: Gastroenterology   • LASIK         Visit Dx:     ICD-10-CM ICD-9-CM   1. Left shoulder pain, unspecified chronicity M25.512 719.41             PT Ortho     Row Name 19 1600       Left Upper Ext    Lt Shoulder Flexion AROM  0-134  -CN      User Key  (r) = Recorded By, (t) = Taken By, (c) = Cosigned By    Initials Name Provider Type    Sumaya Irizarry, PT Physical Therapist                    Therapy Education  Given: HEP, Symptoms/condition management, Pain management, Mobility training, Other (comment)  Program: Progressed  How Provided: Verbal, Demonstration  Provided to: Patient  Level of Understanding: Teach back education performed, Demonstrated, Verbalized     PT OP Goals     Row Name 19 1600          PT Short Term Goals    STG Date to Achieve  19  -CN     STG 1  Pt will be independent and verbalized good understanding of initial HEP to improve ability to manage pain, as well as improve strength and ROM  -CN     STG 1 Progress  Met  -CN      STG 2  Pt will improve L shoulder flexion to at least 145 deg to improve ability to reach overhead.   -CN     STG 2 Progress  Progressing  -CN     STG 2 Progress Comments  Pt able to achieve active flexion to 134 degrees today.   -CN        Long Term Goals    LTG Date to Achieve  04/18/19  -CN     LTG 1  Pt will be independent and verbalized good understanding of advanced HEP to improve ability to manage pain, as well as improve strength and ROM.  -CN     LTG 1 Progress  Progressing  -CN     LTG 1 Progress Comments  Progressing HEP to tolerance for improved RTC and scapular stablity.   -CN     LTG 2  Pt will improve L shoulder flexion to at least 160 deg to improve ability to reach overhead.   -CN     LTG 2 Progress  Ongoing  -CN     LTG 3  Pt will report </=3/10 pain in L shoulder with overhead activities to improve participation in ADLs.  -CN     LTG 3 Progress  Progressing  -CN     LTG 3 Progress Comments  Pt continues with increased pain levels with OH and behind back reaching.   -CN     LTG 4  Pt will improve DASH score to at least </=20% perceived disability to show overall improved quality of life.  -CN     LTG 4 Progress  Progressing  -CN     LTG 4 Progress Comments  Pt scored 21% where 0% is no disability.   -CN       User Key  (r) = Recorded By, (t) = Taken By, (c) = Cosigned By    Initials Name Provider Type    Sumaya Irizarry, PT Physical Therapist          PT Assessment/Plan     Row Name 04/03/19 4947          PT Assessment    Assessment Comments  Pt has attended 10 skilled therapy sessions for treatment of L shoulder pain. Pt with improved ROM measurements, however continues with functional limitations with reaching OH and into IR. Pt reports difficulty reaching into cabinets and donning/doffing shirts, especially scrub top. Pt is progressing well with strengthening and tolerating increased weights/resistance and scored 21% on the DASH where 0% is no disability. Pt would benefit from  continued skilled therapy services to address the deficits and regain full functional mobility.   -CN        PT Plan    PT Frequency  1x/week;2x/week  -CN     Predicted Duration of Therapy Intervention (Therapy Eval)  4 weeks  -CN     PT Plan Comments  PT to continue treating 1-2x/week for 4 weeks consisting of ROM and GH/scapular stability exercises.   -CN       User Key  (r) = Recorded By, (t) = Taken By, (c) = Cosigned By    Initials Name Provider Type    Sumaya Irizarry, PT Physical Therapist            Exercises     Row Name 04/03/19 1500             Subjective Comments    Subjective Comments  It feels ok, I still have trouble reaching OH and putting on a shirt.   -CN         Subjective Pain    Able to rate subjective pain?  yes  -CN         Total Minutes    51510 - PT Therapeutic Exercise Minutes  25  -CN      47968 - PT Manual Therapy Minutes  15  -CN         Exercise 1    Exercise Name 1  Supine B UE flexion with band at wrist  -CN      Cueing 1  Verbal;Demo  -CN      Reps 1  15  -CN      Additional Comments  GTB  -CN         Exercise 2    Exercise Name 2  SL ER  -CN      Cueing 2  Verbal;Tactile  -CN      Sets 2  2  -CN      Reps 2  10  -CN      Additional Comments  2#  -CN         Exercise 3    Exercise Name 3  SL abduction  -CN      Cueing 3  Demo  -CN      Reps 3  10  -CN      Additional Comments  cues for scap retraction  -CN         Exercise 4    Exercise Name 4  SL scapular clock  -CN      Cueing 4  Demo  -CN      Reps 4  10 ea  -CN         Exercise 5    Exercise Name 5  pulleys flex  -CN      Reps 5  10  -CN      Time 5  5 seconds  -CN         Exercise 7    Exercise Name 7  shoulder rows TB standing  -CN      Sets 7  2  -CN      Reps 7  10  -CN      Additional Comments  BTB  -CN         Exercise 8    Exercise Name 8  shoulder ext TB standing  -CN      Sets 8  2  -CN      Reps 8  10  -CN      Additional Comments  BTB  -CN         Exercise 11    Exercise Name 11  wallslides flex B standing   -CN      Reps 11  10  -CN      Time 11  5 seconds  -CN         Exercise 12    Exercise Name 12  UBE  -CN      Time 12  4 min  -CN         Exercise 13    Exercise Name 13  supine PNF D2 flx TB HL  -CN      Sets 13  2  -CN      Reps 13  10  -CN      Additional Comments  GTB  -CN         Exercise 14    Exercise Name 14  Supine punches  -CN      Cueing 14  Demo  -CN      Reps 14  15  -CN      Additional Comments  4#  -CN         Exercise 16    Exercise Name 16  Supine star  -CN      Cueing 16  Demo  -CN      Reps 16  10 ea  -CN      Additional Comments  GTB  -CN         Exercise 17    Exercise Name 17  Supine small circles  -CN      Cueing 17  Demo  -CN      Reps 17  10 CW and CCW  -CN      Additional Comments  3#  -CN        User Key  (r) = Recorded By, (t) = Taken By, (c) = Cosigned By    Initials Name Provider Type    Sumaya Irizarry, PT Physical Therapist           Manual Rx (last 36 hours)      Manual Treatments     Row Name 04/03/19 1500             Total Minutes    32657 - PT Manual Therapy Minutes  15  -CN         Manual Rx 1    Manual Rx 1 Location  left shoulder  -CN      Manual Rx 1 Type  PROM all planes, gentle post and inf joint mobs  -CN         Manual Rx 2    Manual Rx 2 Location  D2 PNF patterns  -CN        User Key  (r) = Recorded By, (t) = Taken By, (c) = Cosigned By    Initials Name Provider Type    Sumaya Irizarry, IMANI Physical Therapist                      Outcome Measure Options: Disabilities of the Arm, Shoulder, and Hand (DASH)(21% where 0% is no disability)         Time Calculation:     Start Time: 1533  Stop Time: 1613  Time Calculation (min): 40 min     Therapy Charges for Today     Code Description Service Date Service Provider Modifiers Qty    10891356853  PT THER PROC EA 15 MIN 4/3/2019 Sumaya Hernandez, PT GP 2    [id number] HC PT MANUAL THERAPY EA 15 MIN 4/3/2019 Sumaya Hernandez, PT GP 1          PT G-Codes  Outcome Measure Options: Disabilities of  the Arm, Shoulder, and Hand (DASH)(21% where 0% is no disability)         Sumaya Hernandez, PT  4/3/2019

## 2019-04-08 ENCOUNTER — APPOINTMENT (OUTPATIENT)
Dept: PHYSICAL THERAPY | Facility: HOSPITAL | Age: 59
End: 2019-04-08

## 2019-04-17 ENCOUNTER — HOSPITAL ENCOUNTER (OUTPATIENT)
Dept: PHYSICAL THERAPY | Facility: HOSPITAL | Age: 59
Setting detail: THERAPIES SERIES
Discharge: HOME | End: 2019-04-17

## 2019-04-17 DIAGNOSIS — M25.512 LEFT SHOULDER PAIN, UNSPECIFIED CHRONICITY: Primary | ICD-10-CM

## 2019-04-17 PROCEDURE — 97110 THERAPEUTIC EXERCISES: CPT

## 2019-04-17 PROCEDURE — 97140 MANUAL THERAPY 1/> REGIONS: CPT

## 2019-04-17 NOTE — THERAPY TREATMENT NOTE
Outpatient Physical Therapy Ortho Treatment Note  [facility]     Patient Name: Carlie Brennan  : 1960  MRN: [id number]  Today's Date: 2019      Visit Date: 2019    Visit Dx:    ICD-10-CM ICD-9-CM   1. Left shoulder pain, unspecified chronicity M25.512 719.41       Patient Active Problem List   Diagnosis   • Essential hypertension   • Gastroesophageal reflux disease without esophagitis   • Mitral valve prolapse   • Rosacea   • Hyperlipidemia   • Health care maintenance   • History of adenomatous polyp of colon   • Meniere's disease of right ear        Past Medical History:   Diagnosis Date   • GERD (gastroesophageal reflux disease)    • Hypertension    • Iron deficiency anemia 2018   • Pregnancy        • Rosacea    • Vertigo         Past Surgical History:   Procedure Laterality Date   • ABDOMINOPLASTY     •  SECTION      x 2   • COLONOSCOPY N/A 3/12/2018    Procedure: COLONOSCOPY INTO CECUM AND NORMAL TI WITH HOT SNARE POLYPECTOMY;  Surgeon: Lincoln Chino MD;  Location: [facility] ENDOSCOPY;  Service: Gastroenterology   • LASIK                         PT Assessment/Plan     Row Name 19 6065          PT Assessment    Assessment Comments  Pt with no change in symptoms since last visit, however low level symptoms due to avoiding exacerbating activities. Pt continues with ROM limitations and to return to MD on Friday. Pt with decreased scapular stability overall, however tolerating current program well with cues.   -CN        PT Plan    PT Plan Comments  Follow up regarding MD visit and continue with emphasis on scapular strenghtening. Consider beach chair stretch next visit.   -CN       User Key  (r) = Recorded By, (t) = Taken By, (c) = Cosigned By    Initials Name Provider Type    Sumaya Irizarry, PT Physical Therapist            Exercises     Row Name 19 1500             Subjective Comments    Subjective Comments  It feels ok, I just avoid the things  that hurt. I go to the orthopedist on Friday.   -CN         Subjective Pain    Able to rate subjective pain?  yes  -CN      Pre-Treatment Pain Level  0  -CN         Total Minutes    89643 - PT Therapeutic Exercise Minutes  25  -CN      24515 - PT Manual Therapy Minutes  15  -CN         Exercise 1    Exercise Name 1  Supine B UE flexion with band at wrist  -CN      Cueing 1  Verbal;Demo  -CN      Reps 1  15  -CN      Additional Comments  GTB  -CN         Exercise 2    Exercise Name 2  SL ER  -CN      Cueing 2  Verbal;Tactile  -CN      Sets 2  2  -CN      Reps 2  10  -CN      Additional Comments  2#  -CN         Exercise 3    Exercise Name 3  SL abduction  -CN      Cueing 3  Demo  -CN      Reps 3  10  -CN      Additional Comments  cue for scap retraction  -CN         Exercise 4    Exercise Name 4  SL scapular clock  -CN      Cueing 4  Demo  -CN      Reps 4  10 ea  -CN         Exercise 5    Exercise Name 5  pulleys flex  -CN      Reps 5  10  -CN      Time 5  5 seconds  -CN         Exercise 9    Exercise Name 9  shoulder ER TB standing palms up  -CN      Sets 9  2  -CN      Reps 9  10  -CN      Additional Comments  GTB  -CN         Exercise 10    Exercise Name 10  Prone Ys, Ts, Ws  -CN      Cueing 10  Demo;Verbal;Tactile  -CN      Reps 10  10 ea  -CN         Exercise 11    Exercise Name 11  wallslides flex B standing  -CN      Reps 11  10  -CN      Time 11  5 seconds  -CN      Additional Comments  a  -CN         Exercise 12    Exercise Name 12  UBE  -CN      Time 12  4 min  -CN         Exercise 13    Exercise Name 13  supine PNF D2 flx TB HL  -CN      Sets 13  2  -CN      Reps 13  10  -CN      Additional Comments  GTB  -CN         Exercise 14    Exercise Name 14  Supine punches  -CN      Cueing 14  Demo  -CN      Reps 14  15  -CN      Additional Comments  4#  -CN         Exercise 16    Exercise Name 16  Supine star  -CN      Cueing 16  Demo  -CN      Reps 16  10 ea  -CN      Additional Comments  GTB  -CN          Exercise 17    Exercise Name 17  Supine small circles  -CN      Cueing 17  Demo  -CN      Reps 17  10 CW and CCW  -CN      Additional Comments  3#  -CN        User Key  (r) = Recorded By, (t) = Taken By, (c) = Cosigned By    Initials Name Provider Type    Sumaya Irizarry, PT Physical Therapist                      Manual Rx (last 36 hours)      Manual Treatments     Row Name 04/17/19 1500             Total Minutes    85888 - PT Manual Therapy Minutes  15  -CN         Manual Rx 1    Manual Rx 1 Location  left shoulder  -CN      Manual Rx 1 Type  PROM all planes, gentle post and inf joint mobs  -CN         Manual Rx 2    Manual Rx 2 Location  D2 PNF patterns, rhythmic stabilization into IR/ER  -CN        User Key  (r) = Recorded By, (t) = Taken By, (c) = Cosigned By    Initials Name Provider Type    Sumaya Irizarry, PT Physical Therapist          PT OP Goals     Row Name 04/17/19 1700          PT Short Term Goals    STG Date to Achieve  03/21/19  -CN     STG 1  Pt will be independent and verbalized good understanding of initial HEP to improve ability to manage pain, as well as improve strength and ROM  -CN     STG 1 Progress  Met  -CN     STG 2  Pt will improve L shoulder flexion to at least 145 deg to improve ability to reach overhead.   -CN     STG 2 Progress  Progressing  -CN     STG 2 Progress Comments  Pt continues with ROM impairments into flexion, abduction and ER.   -CN        Long Term Goals    LTG Date to Achieve  04/18/19  -CN     LTG 1  Pt will be independent and verbalized good understanding of advanced HEP to improve ability to manage pain, as well as improve strength and ROM.  -CN     LTG 1 Progress  Progressing  -CN     LTG 2  Pt will improve L shoulder flexion to at least 160 deg to improve ability to reach overhead.   -CN     LTG 2 Progress  Ongoing  -CN     LTG 3  Pt will report </=3/10 pain in L shoulder with overhead activities to improve participation in ADLs.  -CN     LTG 3  Progress  Progressing  -CN     LTG 4  Pt will improve DASH score to at least </=20% perceived disability to show overall improved quality of life.  -CN     LTG 4 Progress  Progressing  -CN       User Key  (r) = Recorded By, (t) = Taken By, (c) = Cosigned By    Initials Name Provider Type    Sumaya Irizarry, PT Physical Therapist          Therapy Education  Given: HEP, Symptoms/condition management, Pain management, Mobility training, Other (comment)  Program: Progressed  How Provided: Verbal, Demonstration  Provided to: Patient  Level of Understanding: Teach back education performed, Demonstrated, Verbalized              Time Calculation:   Start Time: 1530  Stop Time: 1610  Time Calculation (min): 40 min  Therapy Charges for Today     Code Description Service Date Service Provider Modifiers Qty    64573088192  PT THER PROC EA 15 MIN 4/17/2019 Sumaya Hernandez, PT GP 2    [id number]  PT MANUAL THERAPY EA 15 MIN 4/17/2019 Sumaya Hernandez, PT GP 1                    Sumaya Hernandez PT  4/17/2019

## 2019-04-19 ENCOUNTER — OFFICE VISIT (OUTPATIENT)
Dept: ORTHOPEDIC SURGERY | Facility: CLINIC | Age: 59
End: 2019-04-19

## 2019-04-19 VITALS — TEMPERATURE: 98.4 F | BODY MASS INDEX: 26.66 KG/M2 | HEIGHT: 65 IN | WEIGHT: 160 LBS

## 2019-04-19 DIAGNOSIS — M25.512 LEFT SHOULDER PAIN, UNSPECIFIED CHRONICITY: Primary | ICD-10-CM

## 2019-04-19 DIAGNOSIS — M75.02 ADHESIVE CAPSULITIS OF LEFT SHOULDER: ICD-10-CM

## 2019-04-19 PROCEDURE — 99203 OFFICE O/P NEW LOW 30 MIN: CPT | Performed by: ORTHOPAEDIC SURGERY

## 2019-04-19 PROCEDURE — 20610 DRAIN/INJ JOINT/BURSA W/O US: CPT | Performed by: ORTHOPAEDIC SURGERY

## 2019-04-19 PROCEDURE — 73030 X-RAY EXAM OF SHOULDER: CPT | Performed by: ORTHOPAEDIC SURGERY

## 2019-04-19 RX ADMIN — METHYLPREDNISOLONE ACETATE 80 MG: 80 INJECTION, SUSPENSION INTRA-ARTICULAR; INTRALESIONAL; INTRAMUSCULAR; SOFT TISSUE at 15:16

## 2019-04-19 RX ADMIN — LIDOCAINE HYDROCHLORIDE 4 ML: 20 INJECTION, SOLUTION EPIDURAL; INFILTRATION; INTRACAUDAL; PERINEURAL at 15:16

## 2019-04-19 NOTE — PROGRESS NOTES
New left  Shoulder      Patient: Carlie Brennan        YOB: 1960    Medical Record Number: [id number]        Chief Complaints: left shoulder pain  Chief Complaint   Patient presents with   • Left Shoulder - Pain, Establish Care           History of Present Illness: This is a  Is 58-year-old female who is right-hand-dominant presents with left shoulder pain began 2019 she was then [employer] lifting a package off the top shelf when it fell she caught it with her left arm she had pain that time much worse pain the next day.  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   • metroNIDAZOLE (METROGEL) 1 % gel Apply  topically to the appropriate area on face  as directed.   • naproxen (NAPROSYN) 500 MG tablet Take 1 tablet by mouth 2 (Two) Times a Day With Meals.   • omeprazole (PriLOSEC) 20 MG capsule Take 20 mg by mouth daily as needed.   • triamterene-hydrochlorothiazide (MAXZIDE-25) 37.5-25 MG per tablet Take 1 tablet by mouth Daily.     No current facility-administered medications on file prior to visit.      Current Medications:  Scheduled Meds:  Continuous Infusions:  No current facility-administered medications for this visit.   PRN Meds:.    Past Medical History:   Diagnosis Date   • GERD (gastroesophageal reflux disease)    • Hypertension    • Iron deficiency anemia 2018   • Pregnancy        • Rosacea    • Vertigo         Past Surgical History:   Procedure Laterality Date   • ABDOMINOPLASTY     •  " SECTION      x 2   • COLONOSCOPY N/A 3/12/2018    Procedure: COLONOSCOPY INTO CECUM AND NORMAL TI WITH HOT SNARE POLYPECTOMY;  Surgeon: Lincoln Chino MD;  Location: [facility] ENDOSCOPY;  Service: Gastroenterology   • LASIK          Social History     Occupational History   • Not on file   Tobacco Use   • Smoking status: Never Smoker   • Smokeless tobacco: Never Used   Substance and Sexual Activity   • Alcohol use: No   • Drug use: No   • Sexual activity: Defer      Social History     Social History Narrative   • Not on file        Family History   Problem Relation Age of Onset   • Meniere's disease Mother    • Migraines Sister              Review of Systems: Shoulder pain only the remainder negative per the patient    Review of Systems      Physical Exam: 58 y.o. female  General Appearance:    Alert, cooperative, in no acute distress                   Vitals:    19 1446   Temp: 98.4 °F (36.9 °C)   Weight: 72.6 kg (160 lb)   Height: 165.1 cm (65\")      Patient is alert and read ×3 no acute distress appears her above-listed at height weight and age.  Affect is normal respiratory rate is normal unlabored. Heart rate regular rate rhythm, sclera, dentition and hearing are normal for the purpose of this exam.    Ortho Exam  Physical exam of the left shoulder reveals no overlying skin changes no lymphedema no lymphadenopathy.  Patient has active flexion 150 with mild symptoms passively I can get him to about 160 abduction is similar external rotation is to 0 and internal rotation to his buttocks with  symptoms.  Patient has good rotator cuff strength 4+ over 5 with isometric strength testing with pain.  Patient has a positive impingement and a positive Leo sign.  Patient has good cervical range of motion which is full and asymptomatic no radicular symptoms.  Patient has a normal elbow exam.  Good distal pulses are present    Large Joint Arthrocentesis: L glenohumeral  Date/Time: 2019 3:16 " PM  Consent given by: patient  Site marked: site marked  Timeout: Immediately prior to procedure a time out was called to verify the correct patient, procedure, equipment, support staff and site/side marked as required   Supporting Documentation  Indications: pain   Procedure Details  Location: shoulder - L glenohumeral  Preparation: Patient was prepped and draped in the usual sterile fashion  Needle size: 22 G  Approach: posterior  Medications administered: 80 mg methylPREDNISolone acetate 80 MG/ML; 4 mL lidocaine PF 2% 2 %  Patient tolerance: patient tolerated the procedure well with no immediate complications                Radiology:   AP, Scapular Y and Axillary Lateral of the left shoulder were ordered/reviewed to evauate shoulder pain.  Have no comparative films these are normal other than some mild narrowing of her acromioclavicular joint otherwise no acute bony pathology  Imaging Results (most recent)     Procedure Component Value Units Date/Time    XR Shoulder 2+ View Left [[id number]] Resulted:  04/19/19 1448     Updated:  04/19/19 1448    Impression:       Ordering physician's impression is located in the Encounter Note dated 04/19/19. X-ray performed in the DR room.          Assessment/Plan: Left shoulder pain I think this is a frozen shoulder.  She could have some underlying pathology the rotator cuff or possibly labral pathology that started this.  Plan is to treat the frozen shoulder I will inject her today start her into physical therapy and I will see her back in 3-4 weeks if she fails to improve we will pursue other means of testing  Cortisone Injection. See procedure note.  Cortisone Injection for DIAGNOSTIC and THERAPUTIC purposes.

## 2019-04-22 ENCOUNTER — HOSPITAL ENCOUNTER (OUTPATIENT)
Dept: PHYSICAL THERAPY | Facility: HOSPITAL | Age: 59
Setting detail: THERAPIES SERIES
Discharge: HOME | End: 2019-04-22

## 2019-04-22 DIAGNOSIS — M25.512 LEFT SHOULDER PAIN, UNSPECIFIED CHRONICITY: Primary | ICD-10-CM

## 2019-04-22 PROCEDURE — 97140 MANUAL THERAPY 1/> REGIONS: CPT

## 2019-04-22 PROCEDURE — 97110 THERAPEUTIC EXERCISES: CPT

## 2019-04-23 RX ORDER — METHYLPREDNISOLONE ACETATE 80 MG/ML
80 INJECTION, SUSPENSION INTRA-ARTICULAR; INTRALESIONAL; INTRAMUSCULAR; SOFT TISSUE
Status: COMPLETED | OUTPATIENT
Start: 2019-04-19 | End: 2019-04-19

## 2019-04-23 RX ORDER — LIDOCAINE HYDROCHLORIDE 20 MG/ML
4 INJECTION, SOLUTION EPIDURAL; INFILTRATION; INTRACAUDAL; PERINEURAL
Status: COMPLETED | OUTPATIENT
Start: 2019-04-19 | End: 2019-04-19

## 2019-04-24 ENCOUNTER — HOSPITAL ENCOUNTER (OUTPATIENT)
Dept: PHYSICAL THERAPY | Facility: HOSPITAL | Age: 59
Setting detail: THERAPIES SERIES
Discharge: HOME | End: 2019-04-24

## 2019-04-24 DIAGNOSIS — M25.512 LEFT SHOULDER PAIN, UNSPECIFIED CHRONICITY: Primary | ICD-10-CM

## 2019-04-24 PROCEDURE — 97110 THERAPEUTIC EXERCISES: CPT

## 2019-04-24 PROCEDURE — 97140 MANUAL THERAPY 1/> REGIONS: CPT

## 2019-04-24 NOTE — THERAPY TREATMENT NOTE
Outpatient Physical Therapy Ortho Treatment Note  [facility]     Patient Name: Carlie Brennan  : 1960  MRN: [id number]  Today's Date: 2019      Visit Date: 2019    Visit Dx:    ICD-10-CM ICD-9-CM   1. Left shoulder pain, unspecified chronicity M25.512 719.41       Patient Active Problem List   Diagnosis   • Essential hypertension   • Gastroesophageal reflux disease without esophagitis   • Mitral valve prolapse   • Rosacea   • Hyperlipidemia   • Health care maintenance   • History of adenomatous polyp of colon   • Meniere's disease of right ear        Past Medical History:   Diagnosis Date   • GERD (gastroesophageal reflux disease)    • Hypertension    • Iron deficiency anemia 2018   • Pregnancy        • Rosacea    • Vertigo         Past Surgical History:   Procedure Laterality Date   • ABDOMINOPLASTY     •  SECTION      x 2   • COLONOSCOPY N/A 3/12/2018    Procedure: COLONOSCOPY INTO CECUM AND NORMAL TI WITH HOT SNARE POLYPECTOMY;  Surgeon: Lincoln Chino MD;  Location: [facility] ENDOSCOPY;  Service: Gastroenterology   • LASIK                         PT Assessment/Plan     Row Name 19 1718          PT Assessment    Assessment Comments  Pt with low pain levels this date and impoved ROM noted into abduction this visit. Pt tolerating current ROM program with minimal increased symptoms and good form. Pt with hypomobility noted with joint mobs this date which improved after manual therapy.   -CN        PT Plan    PT Plan Comments  Continue with ROM and strengthening as tolerated. Likey resume scapular strengthening next visit.   -CN       User Key  (r) = Recorded By, (t) = Taken By, (c) = Cosigned By    Initials Name Provider Type    Sumaya Irizarry, PT Physical Therapist            Exercises     Row Name 19 1500             Subjective Comments    Subjective Comments  It feels ok, it is just sore sometimes. I have been doing the exercsies at home and it  is tight.   -CN         Subjective Pain    Able to rate subjective pain?  yes  -CN      Pre-Treatment Pain Level  0  -CN         Total Minutes    19103 - PT Therapeutic Exercise Minutes  25  -CN      36580 - PT Manual Therapy Minutes  15  -CN         Exercise 1    Exercise Name 1  AAROM flexion with bar  -CN      Cueing 1  Demo  -CN      Reps 1  10  -CN      Time 1  5 sec  -CN      Additional Comments  2#  -CN         Exercise 5    Exercise Name 5  pulleys flex  -CN      Reps 5  10  -CN      Time 5  5 seconds  -CN         Exercise 6    Exercise Name 6  PROM with cane into ER in supine  -CN      Cueing 6  Demo;Verbal  -CN      Reps 6  10  -CN      Time 6  5 sec  -CN         Exercise 11    Exercise Name 11  wallslides flex, abd and rainbow standing  -CN      Reps 11  10  -CN      Time 11  5 seconds  -CN         Exercise 12    Exercise Name 12  UBE  -CN      Time 12  4 min  -CN         Exercise 15    Exercise Name 15  beach chair stretch  -CN      Cueing 15  Demo  -CN      Reps 15  10  -CN      Time 15  5 sec  -CN         Exercise 18    Exercise Name 18  IR towel stretch  -CN      Cueing 18  Demo  -CN      Reps 18  10  -CN      Time 18  5 sec  -CN        User Key  (r) = Recorded By, (t) = Taken By, (c) = Cosigned By    Initials Name Provider Type    Sumaya Irizarry PT Physical Therapist                      Manual Rx (last 36 hours)      Manual Treatments     Row Name 04/24/19 1500             Total Minutes    01096 - PT Manual Therapy Minutes  15  -CN         Manual Rx 1    Manual Rx 1 Location  left shoulder  -CN      Manual Rx 1 Type  PROM all planes, gentle post and inf joint mobs  -CN        User Key  (r) = Recorded By, (t) = Taken By, (c) = Cosigned By    Initials Name Provider Type    Sumaya Irizarry PT Physical Therapist              Therapy Education  Given: HEP, Symptoms/condition management, Pain management, Mobility training, Other (comment)  Program: Progressed  How Provided:  Verbal, Demonstration  Provided to: Patient  Level of Understanding: Teach back education performed, Demonstrated, Verbalized              Time Calculation:   Start Time: 1524  Stop Time: 1604  Time Calculation (min): 40 min  Therapy Charges for Today     Code Description Service Date Service Provider Modifiers Qty    65638262741  PT THER PROC EA 15 MIN 4/24/2019 Sumaya Hernandez, PT GP 2    [id number]  PT MANUAL THERAPY EA 15 MIN 4/24/2019 Sumaya Hernandez, PT GP 1                    Sumaya Hernandez, PT  4/24/2019

## 2019-04-29 ENCOUNTER — HOSPITAL ENCOUNTER (OUTPATIENT)
Dept: PHYSICAL THERAPY | Facility: HOSPITAL | Age: 59
Setting detail: THERAPIES SERIES
Discharge: HOME | End: 2019-04-29

## 2019-04-29 DIAGNOSIS — M25.512 LEFT SHOULDER PAIN, UNSPECIFIED CHRONICITY: Primary | ICD-10-CM

## 2019-04-29 PROCEDURE — 97140 MANUAL THERAPY 1/> REGIONS: CPT

## 2019-04-29 PROCEDURE — 97110 THERAPEUTIC EXERCISES: CPT

## 2019-04-29 NOTE — THERAPY TREATMENT NOTE
Outpatient Physical Therapy Ortho Treatment Note  [facility]     Patient Name: Carlie Brennan  : 1960  MRN: [id number]  Today's Date: 2019      Visit Date: 2019    Visit Dx:    ICD-10-CM ICD-9-CM   1. Left shoulder pain, unspecified chronicity M25.512 719.41       Patient Active Problem List   Diagnosis   • Essential hypertension   • Gastroesophageal reflux disease without esophagitis   • Mitral valve prolapse   • Rosacea   • Hyperlipidemia   • Health care maintenance   • History of adenomatous polyp of colon   • Meniere's disease of right ear        Past Medical History:   Diagnosis Date   • GERD (gastroesophageal reflux disease)    • Hypertension    • Iron deficiency anemia 2018   • Pregnancy        • Rosacea    • Vertigo         Past Surgical History:   Procedure Laterality Date   • ABDOMINOPLASTY     •  SECTION      x 2   • COLONOSCOPY N/A 3/12/2018    Procedure: COLONOSCOPY INTO CECUM AND NORMAL TI WITH HOT SNARE POLYPECTOMY;  Surgeon: Lincoln Chino MD;  Location: [facility] ENDOSCOPY;  Service: Gastroenterology   • LASIK                         PT Assessment/Plan     Row Name 19 1609          PT Assessment    Assessment Comments  Pt reports increased pain this weekend and stiffness/guardning noted with PROM this date. Improved range noted with manual techniques and added SL abduction with weight in order to increase abduction strength and ROM.   -CN        PT Plan    PT Plan Comments  Reassess next visit.   -CN       User Key  (r) = Recorded By, (t) = Taken By, (c) = Cosigned By    Initials Name Provider Type    Sumaya Irizarry, PT Physical Therapist            Exercises     Row Name 19 1500             Subjective Comments    Subjective Comments  Well, I did the weedeater over the weekend and it has been sore since. It is down my whole arm. It got tired almost immediately.   -CN         Subjective Pain    Able to rate subjective pain?  yes   -CN      Pre-Treatment Pain Level  2  -CN         Total Minutes    93223 - PT Therapeutic Exercise Minutes  25  -CN      71494 - PT Manual Therapy Minutes  15  -CN         Exercise 1    Exercise Name 1  AAROM flexion with bar  -CN      Cueing 1  Demo  -CN      Reps 1  10  -CN      Time 1  5 sec  -CN      Additional Comments  3#  -CN         Exercise 3    Exercise Name 3  SL abduction  -CN      Cueing 3  Demo  -CN      Reps 3  10  -CN      Time 3  5 sec  -CN         Exercise 5    Exercise Name 5  pulleys flex  -CN      Reps 5  10  -CN      Time 5  5 seconds  -CN         Exercise 6    Exercise Name 6  PROM with cane into ER in supine  -CN      Cueing 6  Demo;Verbal  -CN      Reps 6  10  -CN      Time 6  5 sec  -CN         Exercise 11    Exercise Name 11  wallslides flex, abd and rainbow standing  -CN      Reps 11  10  -CN      Time 11  5 seconds  -CN         Exercise 12    Exercise Name 12  UBE  -CN      Time 12  4 min  -CN         Exercise 15    Exercise Name 15  beach chair stretch  -CN      Cueing 15  Demo  -CN      Reps 15  10  -CN      Time 15  5 sec  -CN        User Key  (r) = Recorded By, (t) = Taken By, (c) = Cosigned By    Initials Name Provider Type    Sumaya Irizarry, IMANI Physical Therapist                      Manual Rx (last 36 hours)      Manual Treatments     Row Name 04/29/19 1500             Total Minutes    90666 - PT Manual Therapy Minutes  15  -CN         Manual Rx 1    Manual Rx 1 Location  left shoulder  -CN      Manual Rx 1 Type  PROM all planes, gentle post and inf joint mobs  -CN        User Key  (r) = Recorded By, (t) = Taken By, (c) = Cosigned By    Initials Name Provider Type    Sumaya Irizarry PT Physical Therapist              Therapy Education  Given: HEP, Symptoms/condition management, Pain management, Mobility training, Other (comment)  Program: Progressed  How Provided: Verbal, Demonstration  Provided to: Patient  Level of Understanding: Teach back education  performed, Demonstrated, Verbalized              Time Calculation:   Start Time: 1534  Stop Time: 1614  Time Calculation (min): 40 min  Therapy Charges for Today     Code Description Service Date Service Provider Modifiers Qty    14774153061  PT THER PROC EA 15 MIN 4/29/2019 Sumaya Hernandez, PT GP 2    [id number]  PT MANUAL THERAPY EA 15 MIN 4/29/2019 Sumaya Hernandez, PT GP 1                    Sumaya Hernandez, PT  4/29/2019

## 2019-05-08 ENCOUNTER — HOSPITAL ENCOUNTER (OUTPATIENT)
Dept: PHYSICAL THERAPY | Facility: HOSPITAL | Age: 59
Setting detail: THERAPIES SERIES
Discharge: HOME | End: 2019-05-08

## 2019-05-08 DIAGNOSIS — M25.512 LEFT SHOULDER PAIN, UNSPECIFIED CHRONICITY: Primary | ICD-10-CM

## 2019-05-08 PROCEDURE — 97110 THERAPEUTIC EXERCISES: CPT

## 2019-05-08 PROCEDURE — 97140 MANUAL THERAPY 1/> REGIONS: CPT

## 2019-05-08 NOTE — THERAPY PROGRESS REPORT/RE-CERT
Outpatient Physical Therapy Ortho Progress Note  [facility]     Patient Name: Carlie Brennan  : 1960  MRN: [id number]  Today's Date: 2019      Visit Date: 2019    Patient Active Problem List   Diagnosis   • Essential hypertension   • Gastroesophageal reflux disease without esophagitis   • Mitral valve prolapse   • Rosacea   • Hyperlipidemia   • Health care maintenance   • History of adenomatous polyp of colon   • Meniere's disease of right ear        Past Medical History:   Diagnosis Date   • GERD (gastroesophageal reflux disease)    • Hypertension    • Iron deficiency anemia 2018   • Pregnancy        • Rosacea    • Vertigo         Past Surgical History:   Procedure Laterality Date   • ABDOMINOPLASTY     •  SECTION      x 2   • COLONOSCOPY N/A 3/12/2018    Procedure: COLONOSCOPY INTO CECUM AND NORMAL TI WITH HOT SNARE POLYPECTOMY;  Surgeon: Lincoln Chino MD;  Location: [facility] ENDOSCOPY;  Service: Gastroenterology   • LASIK         Visit Dx:     ICD-10-CM ICD-9-CM   1. Left shoulder pain, unspecified chronicity M25.512 719.41                             Therapy Education  Education Details: encouraged scapular strengthening at home with addition of RTB row/extension to HEP  Given: Symptoms/condition management, HEP  Program: Reinforced, Progressed  How Provided: Verbal, Demonstration  Provided to: Patient  Level of Understanding: Teach back education performed, Verbalized     PT OP Goals     Row Name 19 1500          PT Short Term Goals    STG Date to Achieve  19  -LB     STG 1  Pt will be independent and verbalized good understanding of initial HEP to improve ability to manage pain, as well as improve strength and ROM  -LB     STG 1 Progress  Met  -LB     STG 2  Pt will improve L shoulder flexion to at least 145 deg to improve ability to reach overhead.   -LB     STG 2 Progress  Met  -LB     STG 2 Progress Comments  L shoulder flexion 155 deg, abduction  150 deg, FIR to L1, ELIZABETH to C7 with tightness  -LB        Long Term Goals    LTG Date to Achieve  04/18/19  -LB     LTG 1  Pt will be independent and verbalized good understanding of advanced HEP to improve ability to manage pain, as well as improve strength and ROM.  -LB     LTG 1 Progress  Progressing  -LB     LTG 2  Pt will improve L shoulder flexion to at least 160 deg to improve ability to reach overhead.   -LB     LTG 2 Progress  Ongoing  -LB     LTG 2 Progress Comments  L shoulder flexion 155 deg, abduction 150 deg, FIR to L1, ELIZABETH to C7 with tightness  -LB     LTG 3  Pt will report </=3/10 pain in L shoulder with overhead activities to improve participation in ADLs.  -LB     LTG 3 Progress  Progressing  -LB     LTG 3 Progress Comments  Pt reports < 3/10 pain today and over weekend  -LB     LTG 4  Pt will improve DASH score to at least </=20% perceived disability to show overall improved quality of life.  -LB     LTG 4 Progress  Progressing  -LB     LTG 4 Progress Comments  Will assess next visit.  -LB       User Key  (r) = Recorded By, (t) = Taken By, (c) = Cosigned By    Initials Name Provider Type    Elif Parker, PT Physical Therapist          PT Assessment/Plan     Row Name 05/08/19 180          PT Assessment    Functional Limitations  Limitation in home management;Limitations in community activities;Performance in leisure activities;Performance in work activities;Performance in self-care ADL  -LB     Impairments  Range of motion;Posture;Pain;Muscle strength;Joint mobility  -LB     Assessment Comments  Pt has participated in 14 skilled PT sessions to address L shoulder pain. Her AROM has progressed well with flexion to 155 deg, abduction to 150 deg, FIR to L1, and ELIZABETH to C7 with tightness. She demonstrates improving tolerance to functional mobility and household tasks. Her pain has remained low over last few sessions with ability to tolerate return to scapular strengthening today. Plan to assess DASH  disability score next visit. Pt is progressing well with skilled PT and recommend continued skilled PT to progress scapular strengthening to improve her ability to perform dressing and household tasks.   -LB     Please refer to paper survey for additional self-reported information  Yes  -LB     Rehab Potential  Good  -LB     Patient/caregiver participated in establishment of treatment plan and goals  Yes  -LB     Patient would benefit from skilled therapy intervention  Yes  -LB        PT Plan    PT Frequency  2x/week  -LB     Predicted Duration of Therapy Intervention (Therapy Eval)  4 weeks   -LB     Planned CPT's?  PT RE-EVAL: 23490;PT THER ACT EA 15 MIN: 40710;PT THER PROC EA 15 MIN: 54758;PT MANUAL THERAPY EA 15 MIN: 83124;PT GAIT TRAINING EA 15 MIN: 99010;PT HOT OR COLD PACK TREAT MCARE;PT ELECTRICAL STIM UNATTEND: ;PT ULTRASOUND EA 15 MIN: 30933;PT EVAL MOD COMPLELITY: 78066;PT HOT/COLD PACK WC NONMCARE: 99098  -LB     PT Plan Comments  Issue DASH, assess tolerance to return to scapular strengthening and progress as able.   -LB       User Key  (r) = Recorded By, (t) = Taken By, (c) = Cosigned By    Initials Name Provider Type    LB Elif Olsen, PT Physical Therapist            Exercises     Row Name 05/08/19 1700 05/08/19 1500          Subjective Comments    Subjective Comments  --  I feel good. I feel much stronger. I weedeated again and it felt fine this time.  -LB        Subjective Pain    Able to rate subjective pain?  --  yes  -LB     Pre-Treatment Pain Level  --  0  -LB     Subjective Pain Comment  --  I don't have pain today.  -LB        Total Minutes    94183 - PT Therapeutic Exercise Minutes  --  30  -LB     33204 - PT Manual Therapy Minutes  15  -LB  --        Exercise 1    Exercise Name 1  --  AAROM flexion with bar  -LB     Cueing 1  --  Demo  -LB     Reps 1  --  10  -LB     Time 1  --  5 sec  -LB     Additional Comments  --  3#  -LB        Exercise 2    Exercise Name 2  --  tband row  -LB      Sets 2  --  2  -LB     Reps 2  --  10  -LB     Additional Comments  --  RTB; cuing for retraction  -LB        Exercise 3    Exercise Name 3  --  --  -LB     Cueing 3  --  --  -LB     Reps 3  --  --  -LB     Time 3  --  --  -LB        Exercise 4    Exercise Name 4  --  tband extension  -LB     Sets 4  --  2  -LB     Reps 4  --  10  -LB     Additional Comments  --  RTB; cuing for retraction  -LB        Exercise 5    Exercise Name 5  --  pulleys flex  -LB     Reps 5  --  10  -LB     Time 5  --  5 seconds  -LB        Exercise 6    Exercise Name 6  --  PROM with cane into ER in supine  -LB     Cueing 6  --  Demo;Verbal  -LB     Reps 6  --  10  -LB     Time 6  --  5 sec  -LB        Exercise 7    Exercise Name 7  --  supine horizontal abduction  -LB     Reps 7  --  12  -LB     Additional Comments  --  RTB  -LB        Exercise 8    Exercise Name 8  --  supine B ER  -LB     Reps 8  --  10  -LB     Additional Comments  --  RTB  -LB        Exercise 11    Exercise Name 11  --  wallslides flex, abd and rainbow standing  -LB     Reps 11  --  10  -LB     Time 11  --  5 seconds  -LB        Exercise 12    Exercise Name 12  --  UBE  -LB     Time 12  --  4 min  -LB        Exercise 15    Exercise Name 15  --  beach chair stretch  -LB     Cueing 15  --  Demo  -LB     Reps 15  --  10  -LB     Time 15  --  5 sec  -LB       User Key  (r) = Recorded By, (t) = Taken By, (c) = Cosigned By    Initials Name Provider Type    Elif Parker PT Physical Therapist           Manual Rx (last 36 hours)      Manual Treatments     Row Name 05/08/19 1700             Total Minutes    45795 - PT Manual Therapy Minutes  15  -LB         Manual Rx 1    Manual Rx 1 Location  left shoulder  -LB      Manual Rx 1 Type  PROM all planes, gentle post and inf joint mobs  -LB        User Key  (r) = Recorded By, (t) = Taken By, (c) = Cosigned By    Initials Name Provider Type    Elif Parker PT Physical Therapist                                Time  Calculation:     Start Time: 1615  Stop Time: 1700  Time Calculation (min): 45 min  Total Timed Code Minutes- PT: 43 minute(s)     Therapy Charges for Today     Code Description Service Date Service Provider Modifiers Qty    85974211633 HC PT THER PROC EA 15 MIN 5/8/2019 Elif Olsen, PT GP 2    47057866608 HC PT MANUAL THERAPY EA 15 MIN 5/8/2019 Elif Olsen, PT GP 1                    Elif Olsen, IMANI  5/8/2019

## 2019-05-10 ENCOUNTER — HOSPITAL ENCOUNTER (OUTPATIENT)
Dept: PHYSICAL THERAPY | Facility: HOSPITAL | Age: 59
Setting detail: THERAPIES SERIES
Discharge: HOME | End: 2019-05-10

## 2019-05-10 DIAGNOSIS — M25.512 LEFT SHOULDER PAIN, UNSPECIFIED CHRONICITY: Primary | ICD-10-CM

## 2019-05-10 PROCEDURE — 97110 THERAPEUTIC EXERCISES: CPT

## 2019-05-10 PROCEDURE — 97140 MANUAL THERAPY 1/> REGIONS: CPT

## 2019-05-10 NOTE — THERAPY TREATMENT NOTE
Outpatient Physical Therapy Ortho Treatment Note  [facility]     Patient Name: Carlie Brennan  : 1960  MRN: [id number]  Today's Date: 5/10/2019      Visit Date: 05/10/2019    Visit Dx:    ICD-10-CM ICD-9-CM   1. Left shoulder pain, unspecified chronicity M25.512 719.41       Patient Active Problem List   Diagnosis   • Essential hypertension   • Gastroesophageal reflux disease without esophagitis   • Mitral valve prolapse   • Rosacea   • Hyperlipidemia   • Health care maintenance   • History of adenomatous polyp of colon   • Meniere's disease of right ear        Past Medical History:   Diagnosis Date   • GERD (gastroesophageal reflux disease)    • Hypertension    • Iron deficiency anemia 2018   • Pregnancy        • Rosacea    • Vertigo         Past Surgical History:   Procedure Laterality Date   • ABDOMINOPLASTY     •  SECTION      x 2   • COLONOSCOPY N/A 3/12/2018    Procedure: COLONOSCOPY INTO CECUM AND NORMAL TI WITH HOT SNARE POLYPECTOMY;  Surgeon: Lincoln Chino MD;  Location: [facility] ENDOSCOPY;  Service: Gastroenterology   • LASIK                         PT Assessment/Plan     Row Name 05/10/19 1443          PT Assessment    Assessment Comments  Pt reports good tolerance to last session without inc symptoms following or with workday today. 0/10 pain. COntinued scapular strengthening and PROM, added RS at 90 deg flexion today. Instructed every other day strengthening and daily ROM HEP.   -LB        PT Plan    PT Plan Comments  Continue scapular strengthening.   -LB       User Key  (r) = Recorded By, (t) = Taken By, (c) = Cosigned By    Initials Name Provider Type    LB Elif Olsen, PT Physical Therapist            Exercises     Row Name 05/10/19 1400 05/10/19 1300          Subjective Comments    Subjective Comments  I did fine with the strengthening. I barely have pain anymore.  -LB  --        Subjective Pain    Able to rate subjective pain?  yes  -LB  --     Pre-Treatment  Pain Level  0  -LB  --        Total Minutes    67231 - PT Therapeutic Exercise Minutes  30  -LB  --     72764 - PT Manual Therapy Minutes  --  15  -LB        Exercise 1    Exercise Name 1  AAROM flexion with bar  -LB  --     Cueing 1  Demo  -LB  --     Reps 1  10  -LB  --     Time 1  5 sec  -LB  --     Additional Comments  3#  -LB  --        Exercise 2    Exercise Name 2  tband row  -LB  --     Sets 2  2  -LB  --     Reps 2  10  -LB  --     Additional Comments  RTB  -LB  --        Exercise 4    Exercise Name 4  tband extension  -LB  --     Sets 4  2  -LB  --     Reps 4  10  -LB  --     Additional Comments  RTB; cuing for retraction  -LB  --        Exercise 5    Exercise Name 5  pulleys flex  -LB  --     Reps 5  10  -LB  --     Time 5  5 seconds  -LB  --        Exercise 6    Exercise Name 6  PROM with cane into ER in supine  -LB  --     Cueing 6  Demo;Verbal  -LB  --     Reps 6  10  -LB  --     Time 6  5 sec  -LB  --        Exercise 7    Exercise Name 7  supine horizontal abduction  -LB  --     Sets 7  2  -LB  --     Reps 7  10  -LB  --     Additional Comments  RTB  -LB  --        Exercise 8    Exercise Name 8  supine B ER  -LB  --     Sets 8  2  -LB  --     Reps 8  10  -LB  --     Additional Comments  RTB  -LB  --        Exercise 9    Exercise Name 9  supine serratus punch  -LB  --     Sets 9  2  -LB  --     Reps 9  10  -LB  --     Additional Comments  2# B  -LB  --        Exercise 11    Exercise Name 11  wallslides flex, abd and rainbow standing  -LB  --     Reps 11  10  -LB  --     Time 11  5 seconds  -LB  --        Exercise 12    Exercise Name 12  UBE  -LB  --     Time 12  4 min  -LB  --        Exercise 15    Exercise Name 15  beach chair stretch  -LB  --     Cueing 15  Demo  -LB  --     Reps 15  10  -LB  --     Time 15  5 sec  -LB  --       User Key  (r) = Recorded By, (t) = Taken By, (c) = Cosigned By    Initials Name Provider Type    Elif Parker, PT Physical Therapist                      Manual Rx (last  36 hours)      Manual Treatments     Row Name 05/10/19 1300             Total Minutes    93914 - PT Manual Therapy Minutes  15  -LB         Manual Rx 1    Manual Rx 1 Location  left shoulder  -LB      Manual Rx 1 Type  PROM all planes, gentle post and inf joint mobs  -LB        User Key  (r) = Recorded By, (t) = Taken By, (c) = Cosigned By    Initials Name Provider Type    Elif Parker, PT Physical Therapist          PT OP Goals     Row Name 05/10/19 1400          PT Short Term Goals    STG Date to Achieve  03/21/19  -LB     STG 1  Pt will be independent and verbalized good understanding of initial HEP to improve ability to manage pain, as well as improve strength and ROM  -LB     STG 1 Progress  Met  -LB     STG 2  Pt will improve L shoulder flexion to at least 145 deg to improve ability to reach overhead.   -LB     STG 2 Progress  Met  -LB        Long Term Goals    LTG Date to Achieve  04/18/19  -LB     LTG 1  Pt will be independent and verbalized good understanding of advanced HEP to improve ability to manage pain, as well as improve strength and ROM.  -LB     LTG 1 Progress  Progressing  -LB     LTG 2  Pt will improve L shoulder flexion to at least 160 deg to improve ability to reach overhead.   -LB     LTG 2 Progress  Ongoing  -LB     LTG 3  Pt will report </=3/10 pain in L shoulder with overhead activities to improve participation in ADLs.  -LB     LTG 3 Progress  Progressing  -LB     LTG 4  Pt will improve DASH score to at least </=20% perceived disability to show overall improved quality of life.  -LB     LTG 4 Progress  Progressing  -LB       User Key  (r) = Recorded By, (t) = Taken By, (c) = Cosigned By    Initials Name Provider Type    Elif Parker, PT Physical Therapist          Therapy Education  Education Details: strengthening every other day; ROM daily  Given: Symptoms/condition management, HEP  Program: Reinforced  How Provided: Verbal, Demonstration  Provided to: Patient  Level of  Understanding: Teach back education performed, Verbalized, Demonstrated              Time Calculation:   Start Time: 1400  Stop Time: 1445  Time Calculation (min): 45 min  Total Timed Code Minutes- PT: 43 minute(s)  Therapy Charges for Today     Code Description Service Date Service Provider Modifiers Qty    53892753560 HC PT THER PROC EA 15 MIN 5/10/2019 Elif Olsen, PT GP 2    [id number] HC PT MANUAL THERAPY EA 15 MIN 5/10/2019 Elif Olsen, PT GP 1                    Elif Olsen, PT  5/10/2019

## 2019-05-13 ENCOUNTER — HOSPITAL ENCOUNTER (OUTPATIENT)
Dept: PHYSICAL THERAPY | Facility: HOSPITAL | Age: 59
Setting detail: THERAPIES SERIES
Discharge: HOME | End: 2019-05-13

## 2019-05-13 DIAGNOSIS — M25.512 LEFT SHOULDER PAIN, UNSPECIFIED CHRONICITY: Primary | ICD-10-CM

## 2019-05-13 PROCEDURE — 97110 THERAPEUTIC EXERCISES: CPT

## 2019-05-13 PROCEDURE — 97140 MANUAL THERAPY 1/> REGIONS: CPT

## 2019-05-13 NOTE — THERAPY TREATMENT NOTE
Outpatient Physical Therapy Ortho Treatment Note  [facility]     Patient Name: Carlie Brennan  : 1960  MRN: [id number]  Today's Date: 2019      Visit Date: 2019    Visit Dx:    ICD-10-CM ICD-9-CM   1. Left shoulder pain, unspecified chronicity M25.512 719.41       Patient Active Problem List   Diagnosis   • Essential hypertension   • Gastroesophageal reflux disease without esophagitis   • Mitral valve prolapse   • Rosacea   • Hyperlipidemia   • Health care maintenance   • History of adenomatous polyp of colon   • Meniere's disease of right ear        Past Medical History:   Diagnosis Date   • GERD (gastroesophageal reflux disease)    • Hypertension    • Iron deficiency anemia 2018   • Pregnancy        • Rosacea    • Vertigo         Past Surgical History:   Procedure Laterality Date   • ABDOMINOPLASTY     •  SECTION      x 2   • COLONOSCOPY N/A 3/12/2018    Procedure: COLONOSCOPY INTO CECUM AND NORMAL TI WITH HOT SNARE POLYPECTOMY;  Surgeon: Lincoln Chino MD;  Location: [facility] ENDOSCOPY;  Service: Gastroenterology   • LASIK                         PT Assessment/Plan     Row Name 19 3082          PT Assessment    Assessment Comments  Pt with continued improved ROM and progressing strengthening to tolerance. Pt reports little pain at this time and most limited with end range of motion and OH movement. Tristen brito continue to progress OH strengthening in order to increase function with work tasks and ADLs.   -CN        PT Plan    PT Plan Comments  Continue with scapular and RTC strengthening, progressing to functional OH strengthening as tolerated.   -CN       User Key  (r) = Recorded By, (t) = Taken By, (c) = Cosigned By    Initials Name Provider Type    Sumaya Irizarry, PT Physical Therapist            Exercises     Row Name 19 1500             Subjective Comments    Subjective Comments  I haven't had a lot of trouble with it. I slept on that  side and my other arm was over it. I don't know if it was the weight of my other arm or what, but it hurt when I woke up.   -CN         Subjective Pain    Able to rate subjective pain?  yes  -CN      Pre-Treatment Pain Level  0  -CN         Total Minutes    46217 - PT Therapeutic Exercise Minutes  30  -CN      38455 - PT Manual Therapy Minutes  15  -CN         Exercise 1    Exercise Name 1  AAROM flexion with bar  -CN      Cueing 1  Demo  -CN      Reps 1  10  -CN      Time 1  5 sec  -CN      Additional Comments  3#  -CN         Exercise 2    Exercise Name 2  tband row  -CN      Sets 2  2  -CN      Reps 2  10  -CN      Additional Comments  RTB  -CN         Exercise 4    Exercise Name 4  tband extension  -CN      Sets 4  2  -CN      Reps 4  10  -CN      Additional Comments  RTB, cues for retraction  -CN         Exercise 5    Exercise Name 5  pulleys flex  -CN      Reps 5  10  -CN      Time 5  5 seconds  -CN         Exercise 6    Exercise Name 6  PROM with cane into ER in supine  -CN      Cueing 6  Demo;Verbal  -CN      Reps 6  10  -CN      Time 6  5 sec  -CN         Exercise 7    Exercise Name 7  supine horizontal abduction  -CN      Sets 7  2  -CN      Reps 7  10  -CN      Additional Comments  GTB  -CN         Exercise 8    Exercise Name 8  supine B ER  -CN      Sets 8  2  -CN      Reps 8  10  -CN      Additional Comments  RTB  -CN         Exercise 9    Exercise Name 9  supine serratus punch  -CN      Sets 9  2  -CN      Reps 9  10  -CN      Additional Comments  2# B  -CN         Exercise 12    Exercise Name 12  UBE  -CN      Time 12  4 min  -CN         Exercise 15    Exercise Name 15  beach chair stretch  -CN      Cueing 15  Demo  -CN      Reps 15  10  -CN      Time 15  5 sec  -CN        User Key  (r) = Recorded By, (t) = Taken By, (c) = Cosigned By    Initials Name Provider Type    CN Sumaya Hernandez, PT Physical Therapist                      Manual Rx (last 36 hours)      Manual Treatments     Row Name  05/13/19 1500             Total Minutes    03235 - PT Manual Therapy Minutes  15  -CN         Manual Rx 1    Manual Rx 1 Location  left shoulder  -CN      Manual Rx 1 Type  PROM all planes, gentle post and inf joint mobs  -CN        User Key  (r) = Recorded By, (t) = Taken By, (c) = Cosigned By    Initials Name Provider Type    Sumaya Irizarry, PT Physical Therapist          PT OP Goals     Row Name 05/13/19 1700          PT Short Term Goals    STG Date to Achieve  03/21/19  -CN     STG 1  Pt will be independent and verbalized good understanding of initial HEP to improve ability to manage pain, as well as improve strength and ROM  -CN     STG 1 Progress  Met  -CN     STG 2  Pt will improve L shoulder flexion to at least 145 deg to improve ability to reach overhead.   -CN     STG 2 Progress  Met  -CN        Long Term Goals    LTG Date to Achieve  04/18/19  -CN     LTG 1  Pt will be independent and verbalized good understanding of advanced HEP to improve ability to manage pain, as well as improve strength and ROM.  -CN     LTG 1 Progress  Progressing  -CN     LTG 2  Pt will improve L shoulder flexion to at least 160 deg to improve ability to reach overhead.   -CN     LTG 2 Progress  Ongoing  -CN     LTG 2 Progress Comments  Pt continues to demonstrate improved shoulder ROM with manual work and exercises.   -CN     LTG 3  Pt will report </=3/10 pain in L shoulder with overhead activities to improve participation in ADLs.  -CN     LTG 3 Progress  Progressing  -CN     LTG 4  Pt will improve DASH score to at least </=20% perceived disability to show overall improved quality of life.  -CN     LTG 4 Progress  Progressing  -CN       User Key  (r) = Recorded By, (t) = Taken By, (c) = Cosigned By    Initials Name Provider Type    Sumaya Irizarry, PT Physical Therapist          Therapy Education  Given: Symptoms/condition management, HEP  Program: Reinforced  How Provided: Verbal, Demonstration  Provided  to: Patient  Level of Understanding: Teach back education performed, Verbalized, Demonstrated              Time Calculation:   Start Time: 1530  Stop Time: 1615  Time Calculation (min): 45 min  Therapy Charges for Today     Code Description Service Date Service Provider Modifiers Qty    63535144824  PT THER PROC EA 15 MIN 5/13/2019 Sumaya Hernandez, PT GP 2    [id number]  PT MANUAL THERAPY EA 15 MIN 5/13/2019 Sumaya Hernandez, PT GP 1                    Sumaya Hernandez, PT  5/13/2019

## 2019-05-15 ENCOUNTER — TELEPHONE (OUTPATIENT)
Dept: ORTHOPEDIC SURGERY | Facility: CLINIC | Age: 59
End: 2019-05-15

## 2019-05-15 ENCOUNTER — HOSPITAL ENCOUNTER (OUTPATIENT)
Dept: PHYSICAL THERAPY | Facility: HOSPITAL | Age: 59
Setting detail: THERAPIES SERIES
Discharge: HOME | End: 2019-05-15

## 2019-05-15 DIAGNOSIS — M25.512 LEFT SHOULDER PAIN, UNSPECIFIED CHRONICITY: Primary | ICD-10-CM

## 2019-05-15 PROCEDURE — 97110 THERAPEUTIC EXERCISES: CPT

## 2019-05-15 PROCEDURE — 97140 MANUAL THERAPY 1/> REGIONS: CPT

## 2019-05-15 NOTE — THERAPY TREATMENT NOTE
Outpatient Physical Therapy Ortho Treatment Note  [facility]     Patient Name: Carlie Brennan  : 1960  MRN: [id number]  Today's Date: 5/15/2019      Visit Date: 05/15/2019    Visit Dx:    ICD-10-CM ICD-9-CM   1. Left shoulder pain, unspecified chronicity M25.512 719.41       Patient Active Problem List   Diagnosis   • Essential hypertension   • Gastroesophageal reflux disease without esophagitis   • Mitral valve prolapse   • Rosacea   • Hyperlipidemia   • Health care maintenance   • History of adenomatous polyp of colon   • Meniere's disease of right ear        Past Medical History:   Diagnosis Date   • GERD (gastroesophageal reflux disease)    • Hypertension    • Iron deficiency anemia 2018   • Pregnancy        • Rosacea    • Vertigo         Past Surgical History:   Procedure Laterality Date   • ABDOMINOPLASTY     •  SECTION      x 2   • COLONOSCOPY N/A 3/12/2018    Procedure: COLONOSCOPY INTO CECUM AND NORMAL TI WITH HOT SNARE POLYPECTOMY;  Surgeon: Lincoln Chino MD;  Location: [facility] ENDOSCOPY;  Service: Gastroenterology   • LASIK                         PT Assessment/Plan     Row Name 05/15/19 1611          PT Assessment    Assessment Comments  Pt with good ROM improvements and tolerating progression of strengthening well. Added several standing OH exercises this date met with fatigue, however no pain. Pt continues with decreased endurance with OH tasks and would benefit from continued skilled intervention to return to all work activities and ADLs.   -CN        PT Plan    PT Plan Comments  Assess response to added exercises and continue to progress OH strengthening as tolerated.   -CN       User Key  (r) = Recorded By, (t) = Taken By, (c) = Cosigned By    Initials Name Provider Type    Sumaya Irizarry, PT Physical Therapist            Exercises     Row Name 05/15/19 1500             Subjective Comments    Subjective Comments  I was weed eating yesterday and it  was just sore afterwards. It just gets really tired.   -CN         Subjective Pain    Able to rate subjective pain?  yes  -CN      Pre-Treatment Pain Level  0  -CN         Total Minutes    68260 - PT Therapeutic Exercise Minutes  30  -CN      19677 - PT Manual Therapy Minutes  10  -CN         Exercise 1    Exercise Name 1  Seated flexion with bar  -CN      Cueing 1  Demo  -CN      Reps 1  10  -CN      Additional Comments  2#  -CN         Exercise 2    Exercise Name 2  tband row  -CN      Sets 2  2  -CN      Reps 2  10  -CN      Additional Comments  GTB  -CN         Exercise 3    Exercise Name 3  Standing abduction to 90  -CN      Cueing 3  Demo  -CN      Sets 3  2  -CN      Reps 3  10  -CN      Additional Comments  1#  -CN         Exercise 4    Exercise Name 4  tband extension  -CN      Sets 4  2  -CN      Reps 4  10  -CN      Additional Comments  GTB  -CN         Exercise 5    Exercise Name 5  Shelf taps  -CN      Reps 5  10  -CN      Additional Comments  1#  -CN         Exercise 7    Exercise Name 7  Supine star  -CN      Reps 7  10  -CN      Additional Comments  GTB  -CN         Exercise 8    Exercise Name 8  Standding B ER  -CN      Sets 8  2  -CN      Reps 8  10  -CN      Additional Comments  RTB  -CN         Exercise 10    Exercise Name 10  Ball bounces up wall, 10 bounces at the top  -CN      Cueing 10  Demo  -CN      Reps 10  10  -CN      Additional Comments  L1 ball  -CN         Exercise 11    Exercise Name 11  wallslides flex, abd and rainbow standing  -CN      Reps 11  10  -CN      Time 11  5 seconds  -CN         Exercise 12    Exercise Name 12  UBE  -CN      Time 12  4 min  -CN         Exercise 13    Exercise Name 13  Standing ABCs at 110 deg  -CN      Cueing 13  Demo  -CN      Reps 13  1  -CN      Additional Comments  1#  -CN         Exercise 16    Exercise Name 16  Small ball circles at mirror  -CN      Cueing 16  Demo  -CN      Reps 16  10 Cw and CCW  -CN      Additional Comments  L1 ball  -CN          Exercise 18    Exercise Name 18  IR towel stretch  -CN      Cueing 18  Demo  -CN      Reps 18  10  -CN      Time 18  5 sec  -CN        User Key  (r) = Recorded By, (t) = Taken By, (c) = Cosigned By    Initials Name Provider Type    Sumaya Irizarry PT Physical Therapist                      Manual Rx (last 36 hours)      Manual Treatments     Row Name 05/15/19 1500             Total Minutes    66144 - PT Manual Therapy Minutes  10  -CN         Manual Rx 1    Manual Rx 1 Location  left shoulder  -CN      Manual Rx 1 Type  PROM all planes, gentle post and inf joint mobs  -CN        User Key  (r) = Recorded By, (t) = Taken By, (c) = Cosigned By    Initials Name Provider Type    Sumaya Irizarry PT Physical Therapist              Therapy Education  Given: Symptoms/condition management, HEP  Program: Progressed  How Provided: Verbal, Demonstration  Provided to: Patient  Level of Understanding: Teach back education performed, Verbalized, Demonstrated              Time Calculation:   Start Time: 1535  Stop Time: 1615  Time Calculation (min): 40 min  Therapy Charges for Today     Code Description Service Date Service Provider Modifiers Qty    40501347210  PT THER PROC EA 15 MIN 5/15/2019 Sumaya Hernandez, PT GP 2    [id number]  PT MANUAL THERAPY EA 15 MIN 5/15/2019 Sumaya Hernandez, PT GP 1                    Sumaya Hernandez PT  5/15/2019

## 2019-05-20 ENCOUNTER — OFFICE VISIT (OUTPATIENT)
Dept: ORTHOPEDIC SURGERY | Facility: CLINIC | Age: 59
End: 2019-05-20

## 2019-05-20 ENCOUNTER — HOSPITAL ENCOUNTER (OUTPATIENT)
Dept: PHYSICAL THERAPY | Facility: HOSPITAL | Age: 59
Setting detail: THERAPIES SERIES
Discharge: HOME | End: 2019-05-20

## 2019-05-20 VITALS — TEMPERATURE: 97.1 F | BODY MASS INDEX: 27.92 KG/M2 | WEIGHT: 167.6 LBS | HEIGHT: 65 IN

## 2019-05-20 DIAGNOSIS — M75.02 ADHESIVE CAPSULITIS OF LEFT SHOULDER: Primary | ICD-10-CM

## 2019-05-20 DIAGNOSIS — M25.512 LEFT SHOULDER PAIN, UNSPECIFIED CHRONICITY: Primary | ICD-10-CM

## 2019-05-20 PROCEDURE — 97110 THERAPEUTIC EXERCISES: CPT

## 2019-05-20 PROCEDURE — 97140 MANUAL THERAPY 1/> REGIONS: CPT

## 2019-05-20 PROCEDURE — 99212 OFFICE O/P EST SF 10 MIN: CPT | Performed by: ORTHOPAEDIC SURGERY

## 2019-05-20 NOTE — THERAPY TREATMENT NOTE
Outpatient Physical Therapy Ortho Treatment Note  [facility]     Patient Name: Carlie Brennan  : 1960  MRN: [id number]  Today's Date: 2019      Visit Date: 2019    Visit Dx:    ICD-10-CM ICD-9-CM   1. Left shoulder pain, unspecified chronicity M25.512 719.41       Patient Active Problem List   Diagnosis   • Essential hypertension   • Gastroesophageal reflux disease without esophagitis   • Mitral valve prolapse   • Rosacea   • Hyperlipidemia   • Health care maintenance   • History of adenomatous polyp of colon   • Meniere's disease of right ear        Past Medical History:   Diagnosis Date   • GERD (gastroesophageal reflux disease)    • Hypertension    • Iron deficiency anemia 2018   • Pregnancy        • Rosacea    • Vertigo         Past Surgical History:   Procedure Laterality Date   • ABDOMINOPLASTY     •  SECTION      x 2   • COLONOSCOPY N/A 3/12/2018    Procedure: COLONOSCOPY INTO CECUM AND NORMAL TI WITH HOT SNARE POLYPECTOMY;  Surgeon: Lincoln Chino MD;  Location: [facility] ENDOSCOPY;  Service: Gastroenterology   • LASIK                         PT Assessment/Plan     Row Name 19 1611          PT Assessment    Assessment Comments  Pt reports no pain today and released by MD this afternoon. Pt progressing well with current program and working toward improved OH strength and endurance as well as end range of motion. Pt notes fatigue with OH activities, however progressing weights as expected.   -CN        PT Plan    PT Plan Comments  Reassess next visit.   -CN       User Key  (r) = Recorded By, (t) = Taken By, (c) = Cosigned By    Initials Name Provider Type    Sumaya Irizarry, PT Physical Therapist            Exercises     Row Name 19 1500             Subjective Comments    Subjective Comments  I saw Dr. Leslie today and she said that I didn't need to come back. She said just to continue with PT.   -CN         Subjective Pain    Able to rate  subjective pain?  yes  -CN      Pre-Treatment Pain Level  0  -CN         Total Minutes    41293 - PT Therapeutic Exercise Minutes  30  -CN      93999 - PT Manual Therapy Minutes  10  -CN         Exercise 1    Exercise Name 1  Seated flexion with bar  -CN      Reps 1  15  -CN      Additional Comments  2#  -CN         Exercise 2    Exercise Name 2  tband row  -CN      Sets 2  2  -CN      Reps 2  10  -CN      Additional Comments  GTB  -CN         Exercise 3    Exercise Name 3  Standing abduction to 90  -CN      Cueing 3  Demo  -CN      Sets 3  2  -CN      Reps 3  10  -CN      Additional Comments  1#  -CN         Exercise 4    Exercise Name 4  tband extension  -CN      Cueing 4  Demo  -CN      Sets 4  2  -CN      Reps 4  10  -CN      Additional Comments  GTB  -CN         Exercise 6    Exercise Name 6  Doorway ER stretch  -CN      Cueing 6  Demo  -CN      Reps 6  10  -CN      Time 6  10  -CN         Exercise 7    Exercise Name 7  Supine star  -CN      Reps 7  10  -CN      Additional Comments  GTB  -CN         Exercise 8    Exercise Name 8  Standding B ER  -CN      Sets 8  2  -CN      Reps 8  10  -CN      Additional Comments  GTB  -CN         Exercise 10    Exercise Name 10  Ball bounces up wall, 10 bounces at the top  -CN      Cueing 10  Demo  -CN      Reps 10  10  -CN      Additional Comments  L1 ball  -CN         Exercise 11    Exercise Name 11  wallslides flex, abd and rainbow standing  -CN      Reps 11  10  -CN      Time 11  5 seconds  -CN         Exercise 12    Exercise Name 12  UBE  -CN      Time 12  4 min  -CN         Exercise 13    Exercise Name 13  Standing ABCs at 110 deg  -CN      Cueing 13  Demo  -CN      Reps 13  1  -CN      Additional Comments  1#  -CN         Exercise 16    Exercise Name 16  Small ball circles at mirror  -CN      Cueing 16  Demo  -CN      Reps 16  10 Cw and CCW  -CN      Additional Comments  L1 ball  -CN         Exercise 18    Exercise Name 18  IR towel stretch  -CN      Cueing 18  Demo   -CN      Reps 18  10  -CN      Time 18  5 sec  -CN        User Key  (r) = Recorded By, (t) = Taken By, (c) = Cosigned By    Initials Name Provider Type    Sumaya Irizarry PT Physical Therapist                      Manual Rx (last 36 hours)      Manual Treatments     Row Name 05/20/19 1500             Total Minutes    51993 - PT Manual Therapy Minutes  10  -CN         Manual Rx 1    Manual Rx 1 Location  left shoulder  -CN      Manual Rx 1 Type  PROM all planes, post and inf joint mobs  -CN        User Key  (r) = Recorded By, (t) = Taken By, (c) = Cosigned By    Initials Name Provider Type    Sumaya Irizarry PT Physical Therapist              Therapy Education  Given: Symptoms/condition management, HEP  Program: Progressed  How Provided: Verbal, Demonstration  Provided to: Patient  Level of Understanding: Teach back education performed, Verbalized, Demonstrated              Time Calculation:   Start Time: 1532  Stop Time: 1612  Time Calculation (min): 40 min  Therapy Charges for Today     Code Description Service Date Service Provider Modifiers Qty    [id number]  PT THER PROC EA 15 MIN 5/20/2019 Sumaya Hernandez, PT GP 2    [id number]  PT MANUAL THERAPY EA 15 MIN 5/20/2019 Sumaya Hernandez, PT GP 1                    Sumaya Hernandez PT  5/20/2019

## 2019-05-20 NOTE — PROGRESS NOTES
Patient: Carlie Brennan  YOB: 1960  Date of Service: 2019    Chief Complaints:   Chief Complaint   Patient presents with   • Left Shoulder - Follow-up, Pain   Left shoulder follow-up    Subjective:    History of Present Illness: Pt is seen in the office today with complaints of Chief Complaint   Patient presents with   • Left Shoulder - Follow-up, Pain   .    Patient is here follow left shoulder pain she was felt to have adhesive capsulitis on her last visit we injected her start of physical therapy she states she doing great she has about 10% of her original symptoms remaining      Allergies: No Known Allergies    Medications:   Home Medications:  Current Outpatient Medications on File Prior to Visit   Medication Sig   • metroNIDAZOLE (METROGEL) 1 % gel Apply  topically to the appropriate area on face  as directed.   • omeprazole (PriLOSEC) 20 MG capsule Take 20 mg by mouth daily as needed.   • triamterene-hydrochlorothiazide (MAXZIDE-25) 37.5-25 MG per tablet Take 1 tablet by mouth Daily.   • meclizine (ANTIVERT) 25 MG tablet Take 1 tablet by mouth 3 (Three) Times a Day As Needed for dizziness.   • naproxen (NAPROSYN) 500 MG tablet Take 1 tablet by mouth 2 (Two) Times a Day With Meals.     No current facility-administered medications on file prior to visit.      Current Medications:  Scheduled Meds:  Continuous Infusions:  No current facility-administered medications for this visit.   PRN Meds:.    I have reviewed the patient's medical history in detail and updated the computerized patient record.  Review and summarization of old records include:    Past Medical History:   Diagnosis Date   • GERD (gastroesophageal reflux disease)    • Hypertension    • Iron deficiency anemia 2018   • Pregnancy        • Rosacea    • Vertigo         Past Surgical History:   Procedure Laterality Date   • ABDOMINOPLASTY     •  SECTION      x 2   • COLONOSCOPY N/A 3/12/2018    Procedure:  COLONOSCOPY INTO CECUM AND NORMAL TI WITH HOT SNARE POLYPECTOMY;  Surgeon: Lincoln Chino MD;  Location: [facility] ENDOSCOPY;  Service: Gastroenterology   • LASIK          Social History     Occupational History   • Not on file   Tobacco Use   • Smoking status: Never Smoker   • Smokeless tobacco: Never Used   Substance and Sexual Activity   • Alcohol use: No   • Drug use: No   • Sexual activity: Defer    Social History     Social History Narrative   • Not on file        Family History   Problem Relation Age of Onset   • Meniere's disease Mother    • Migraines Sister        ROS: 14 point review of systems was performed and was negative except for documented findings in HPI and today's encounter.     Allergies: No Known Allergies  Constitutional:  Denies fever, shaking or chills   Eyes:  Denies change in visual acuity   HENT:  Denies nasal congestion or sore throat   Respiratory:  Denies cough or shortness of breath   Cardiovascular:  Denies chest pain or severe LE edema   GI:  Denies abdominal pain, nausea, vomiting, bloody stools or diarrhea   Musculoskeletal:  Numbness, tingling, or loss of motor function only as noted above in history of present illness.  : Denies painful urination or hematuria  Integument:  Denies rash, lesion or ulceration   Neurologic:  Denies headache or focal weakness  Endocrine:  Denies lymphadenopathy  Psych:  Denies confusion or change in mental status   Hem:  Denies active bleeding      Physical Exam: 58 y.o. female  Wt Readings from Last 3 Encounters:   05/20/19 76 kg (167 lb 9.6 oz)   04/19/19 72.6 kg (160 lb)   03/29/19 75.3 kg (166 lb)       Body mass index is 27.89 kg/m².  Facility age limit for growth percentiles is 20 years.  Vitals:    05/20/19 1337   Temp: 97.1 °F (36.2 °C)     Vital signs reviewed.   General Appearance:    Alert, cooperative, in no acute distress                  Eyes: conjunctiva clear  ENT: external ears and nose atraumatic  CV: no peripheral edema  Resp:  normal respiratory effort  Skin: no rashes or wounds; normal turgor  Psych: mood and affect appropriate  Lymph: no nodes appreciated  Neuro: gross sensation intact  Vascular:  Palpable peripheral pulse in noted extremity    Ortho exam    Physical exam of the left shoulder reveals no overlying skin changes no lymphedema no lymphadenopathy.  Patient has active flexion 175 with mild symptoms abduction is similar external rotation is to 45 and internal rotation to the lower lumbar spine with mild symptoms.  Patient has good rotator cuff strength 4+ over 5 with isometric strength testing with pain.  Patient has a positive impingement and a positive Leo sign.  Patient has good cervical range of motion which is full and asymptomatic no radicular symptoms.  Patient has a normal elbow exam.  Good distal pulses are presentPatient has pain with overhead activity and a positive Neer sign and a positive empty can sign  They have a positive drop arm any definitive painful arc               Assessment: Left shoulder adhesive capsulitis overall doing much better    Plan: She continue progress her activities as long as she is doing well she can follow-up as needed  Maeve Leslie M.D.

## 2019-05-22 ENCOUNTER — HOSPITAL ENCOUNTER (OUTPATIENT)
Dept: PHYSICAL THERAPY | Facility: HOSPITAL | Age: 59
Setting detail: THERAPIES SERIES
Discharge: HOME | End: 2019-05-22

## 2019-05-22 DIAGNOSIS — M25.512 LEFT SHOULDER PAIN, UNSPECIFIED CHRONICITY: Primary | ICD-10-CM

## 2019-05-22 PROCEDURE — 97110 THERAPEUTIC EXERCISES: CPT

## 2019-05-22 NOTE — THERAPY PROGRESS REPORT/RE-CERT
Outpatient Physical Therapy Ortho Re-Assessment  [facility]     Patient Name: Carlie Brennan  : 1960  MRN: [id number]  Today's Date: 2019      Visit Date: 2019    Patient Active Problem List   Diagnosis   • Essential hypertension   • Gastroesophageal reflux disease without esophagitis   • Mitral valve prolapse   • Rosacea   • Hyperlipidemia   • Health care maintenance   • History of adenomatous polyp of colon   • Meniere's disease of right ear        Past Medical History:   Diagnosis Date   • GERD (gastroesophageal reflux disease)    • Hypertension    • Iron deficiency anemia 2018   • Pregnancy        • Rosacea    • Vertigo         Past Surgical History:   Procedure Laterality Date   • ABDOMINOPLASTY     •  SECTION      x 2   • COLONOSCOPY N/A 3/12/2018    Procedure: COLONOSCOPY INTO CECUM AND NORMAL TI WITH HOT SNARE POLYPECTOMY;  Surgeon: Lincoln Chino MD;  Location: [facility] ENDOSCOPY;  Service: Gastroenterology   • LASIK         Visit Dx:     ICD-10-CM ICD-9-CM   1. Left shoulder pain, unspecified chronicity M25.512 719.41             PT Ortho     Row Name 19 1500       Left Upper Ext    Lt Shoulder Flexion AROM  154  -CN      User Key  (r) = Recorded By, (t) = Taken By, (c) = Cosigned By    Initials Name Provider Type    Sumaya Irizarry, PT Physical Therapist                      Therapy Education  Given: Symptoms/condition management, HEP  Program: Progressed  How Provided: Verbal, Demonstration  Provided to: Patient  Level of Understanding: Teach back education performed, Verbalized, Demonstrated     PT OP Goals     Row Name 19 1500          PT Short Term Goals    STG Date to Achieve  19  -CN     STG 1  Pt will be independent and verbalized good understanding of initial HEP to improve ability to manage pain, as well as improve strength and ROM  -CN     STG 1 Progress  Met  -CN     STG 2  Pt will improve L shoulder flexion to at least  145 deg to improve ability to reach overhead.   -CN     STG 2 Progress  Met  -CN        Long Term Goals    LTG Date to Achieve  04/18/19  -CN     LTG 1  Pt will be independent and verbalized good understanding of advanced HEP to improve ability to manage pain, as well as improve strength and ROM.  -CN     LTG 1 Progress  Progressing  -CN     LTG 1 Progress Comments  Continuing to progress OH strengthening and stability exercies.   -CN     LTG 2  Pt will improve L shoulder flexion to at least 160 deg to improve ability to reach overhead.   -CN     LTG 2 Progress  Progressing  -CN     LTG 2 Progress Comments  Pt able to achieve 154 deg active flexion.   -CN     LTG 3  Pt will report </=3/10 pain in L shoulder with overhead activities to improve participation in ADLs.  -CN     LTG 3 Progress  Met  -CN     LTG 3 Progress Comments  Pt reports pain rated 3/10 with reaching OH at worst.   -CN     LTG 4  Pt will improve DASH score to at least </=20% perceived disability to show overall improved quality of life.  -CN     LTG 4 Progress  Met  -CN     LTG 4 Progress Comments  Pt scored 7% on the DASH where 0% is no disability.   -CN     LTG 5  Pt will demonstrate improved IR as evidenced by ability to fasten bra without prior stretching 100% of the time in order to return to PLOF.   -CN     LTG 5 Progress  New  -CN       User Key  (r) = Recorded By, (t) = Taken By, (c) = Cosigned By    Initials Name Provider Type    Sumaya Irizarry, PT Physical Therapist          PT Assessment/Plan     Row Name 05/22/19 7709          PT Assessment    Functional Limitations  Limitations in functional capacity and performance;Performance in self-care ADL;Performance in work activities  -CN     Impairments  Muscle strength;Range of motion;Joint mobility;Posture  -CN     Assessment Comments  Pt has attended 20 skilled therapy sessions for treatment of L shoulder pain. Pt diagnosed with adhesive capsulitis and has been progressing well  with ROM based exercises and manual work. Pt notes improved ability to reach behind back, however requires prior stretching in order to perform tasks such as fastening bra. Pt reports continued increased pain WBing through L UE (i.e. pushing cart at work) and demonstrates quick fatigue with OH strengthening. Pt continues with functional limitations due to decreased IR and ER ROM and would benefit from continued skilled PT to address remaining deficits in order to perform full job tasks and ADLs.   -CN     Please refer to paper survey for additional self-reported information  Yes  -CN     Rehab Potential  Good  -CN     Patient/caregiver participated in establishment of treatment plan and goals  Yes  -CN     Patient would benefit from skilled therapy intervention  Yes  -CN        PT Plan    PT Frequency  1x/week  -CN     Predicted Duration of Therapy Intervention (Therapy Eval)  3 week  -CN     PT Plan Comments  Continue with OH strengthening, IR/ER ROM and WBing activities to tolerance. Consider wall push up next visit.   -CN       User Key  (r) = Recorded By, (t) = Taken By, (c) = Cosigned By    Initials Name Provider Type    Sumaya Irizarry, PT Physical Therapist            Exercises     Row Name 05/22/19 1500             Subjective Comments    Subjective Comments  It feels ok today. I pushed down on a cart while I was wheeling it and that didn't feel good.   -CN         Subjective Pain    Able to rate subjective pain?  yes  -CN      Pre-Treatment Pain Level  0  -CN         Total Minutes    08956 - PT Therapeutic Exercise Minutes  40  -CN         Exercise 1    Exercise Name 1  Seated flexion with bar  -CN      Sets 1  2  -CN      Reps 1  10  -CN      Additional Comments  3#  -CN         Exercise 2    Exercise Name 2  tband row  -CN      Sets 2  2  -CN      Reps 2  10  -CN      Additional Comments  BTB  -CN         Exercise 3    Exercise Name 3  Standing abduction to 90  -CN      Cueing 3  Demo  -CN       Sets 3  2  -CN      Reps 3  10  -CN      Additional Comments  2#  -CN         Exercise 4    Exercise Name 4  tband extension  -CN      Cueing 4  Demo  -CN      Sets 4  2  -CN      Reps 4  10  -CN      Additional Comments  GTB  -CN         Exercise 7    Exercise Name 7  Supine star  -CN      Sets 7  2  -CN      Reps 7  10  -CN      Additional Comments  GTB  -CN         Exercise 8    Exercise Name 8  Standding B ER  -CN      Sets 8  2  -CN      Reps 8  10  -CN      Additional Comments  GTB  -CN         Exercise 9    Exercise Name 9  supine serratus punch  -CN      Sets 9  2  -CN      Reps 9  10  -CN      Additional Comments  3# B  -CN         Exercise 10    Exercise Name 10  Ball bounces up wall, 10 bounces at the top  -CN      Cueing 10  Demo  -CN      Reps 10  10  -CN      Additional Comments  L1 ball  -CN         Exercise 11    Exercise Name 11  wallslides flex, abd and rainbow standing  -CN      Reps 11  10  -CN      Time 11  5 seconds  -CN         Exercise 14    Exercise Name 14  SL ER  -CN      Cueing 14  Verbal  -CN      Sets 14  2  -CN      Reps 14  10  -CN      Additional Comments  2#  -CN         Exercise 15    Exercise Name 15  Pulley into flexion  -CN      Cueing 15  Verbal  -CN      Reps 15  15  -CN      Time 15  5 sec  -CN         Exercise 16    Exercise Name 16  Small ball circles at mirror  -CN      Cueing 16  Demo  -CN      Reps 16  10 Cw and CCW  -CN      Additional Comments  L1 ball  -CN         Exercise 18    Exercise Name 18  IR towel stretch  -CN      Cueing 18  Demo  -CN      Reps 18  10  -CN      Time 18  5 sec  -CN        User Key  (r) = Recorded By, (t) = Taken By, (c) = Cosigned By    Initials Name Provider Type    Sumaya Irizarry, PT Physical Therapist                        Outcome Measure Options: Disabilities of the Arm, Shoulder, and Hand (DASH)(7% where 0% is no disability)         Time Calculation:     Start Time: 1533  Stop Time: 1613  Time Calculation (min): 40 min      Therapy Charges for Today     Code Description Service Date Service Provider Modifiers Qty    16687268318 HC PT THER PROC EA 15 MIN 5/22/2019 Sumaya Hernandez, PT GP 3          PT G-Codes  Outcome Measure Options: Disabilities of the Arm, Shoulder, and Hand (DASH)(7% where 0% is no disability)         Sumaya Hernandez, PT  5/22/2019

## 2019-06-03 ENCOUNTER — HOSPITAL ENCOUNTER (OUTPATIENT)
Dept: PHYSICAL THERAPY | Facility: HOSPITAL | Age: 59
Setting detail: THERAPIES SERIES
Discharge: HOME | End: 2019-06-03

## 2019-06-03 DIAGNOSIS — M25.512 LEFT SHOULDER PAIN, UNSPECIFIED CHRONICITY: Primary | ICD-10-CM

## 2019-06-03 PROCEDURE — 97110 THERAPEUTIC EXERCISES: CPT

## 2019-06-03 NOTE — THERAPY TREATMENT NOTE
Outpatient Physical Therapy Ortho Treatment Note  [facility]     Patient Name: Carlie Brennan  : 1960  MRN: [id number]  Today's Date: 6/3/2019      Visit Date: 2019    Visit Dx:    ICD-10-CM ICD-9-CM   1. Left shoulder pain, unspecified chronicity M25.512 719.41       Patient Active Problem List   Diagnosis   • Essential hypertension   • Gastroesophageal reflux disease without esophagitis   • Mitral valve prolapse   • Rosacea   • Hyperlipidemia   • Health care maintenance   • History of adenomatous polyp of colon   • Meniere's disease of right ear        Past Medical History:   Diagnosis Date   • GERD (gastroesophageal reflux disease)    • Hypertension    • Iron deficiency anemia 2018   • Pregnancy        • Rosacea    • Vertigo         Past Surgical History:   Procedure Laterality Date   • ABDOMINOPLASTY     •  SECTION      x 2   • COLONOSCOPY N/A 3/12/2018    Procedure: COLONOSCOPY INTO CECUM AND NORMAL TI WITH HOT SNARE POLYPECTOMY;  Surgeon: Lincoln Chino MD;  Location: [facility] ENDOSCOPY;  Service: Gastroenterology   • LASIK                         PT Assessment/Plan     Row Name 19 1801          PT Assessment    Assessment Comments  Pt reports improved ability to don bra and decreased fatigue with OH activities. Continuing to progress resistance and endurance with exercises which pt tolerates well with minimal rest breaks.   -CN        PT Plan    PT Plan Comments  Likely D/C to independent management of symptoms next visit pending no increase in pain.   -CN       User Key  (r) = Recorded By, (t) = Taken By, (c) = Cosigned By    Initials Name Provider Type    Sumaya Irizarry, PT Physical Therapist            Exercises     Row Name 19 1600             Subjective Comments    Subjective Comments  It feels so much better. I still have trouble reaching up to fasten my bra.   -CN         Subjective Pain    Able to rate subjective pain?  yes  -CN       Pre-Treatment Pain Level  0  -CN         Total Minutes    35619 - PT Therapeutic Exercise Minutes  40  -CN         Exercise 1    Exercise Name 1  Seated flexion with bar  -CN      Sets 1  2  -CN      Reps 1  10  -CN      Additional Comments  3#  -CN         Exercise 2    Exercise Name 2  tband row  -CN      Sets 2  2  -CN      Reps 2  10  -CN      Additional Comments  BTB  -CN         Exercise 3    Exercise Name 3  Standing abduction to 90  -CN      Cueing 3  Demo  -CN      Sets 3  2  -CN      Reps 3  10  -CN      Additional Comments  2#  -CN         Exercise 4    Exercise Name 4  tband extension  -CN      Cueing 4  Demo  -CN      Reps 4  10  -CN      Additional Comments  BTB  -CN         Exercise 6    Exercise Name 6  Doorway ER stretch  -CN      Cueing 6  Demo  -CN      Reps 6  10  -CN      Time 6  10  -CN         Exercise 7    Exercise Name 7  Supine star  -CN      Sets 7  2  -CN      Reps 7  10  -CN      Additional Comments  GTB  -CN         Exercise 9    Exercise Name 9  supine serratus punch  -CN      Sets 9  2  -CN      Reps 9  10  -CN      Additional Comments  3# B  -CN         Exercise 10    Exercise Name 10  Ball bounces up wall, 10 bounces at the top  -CN      Cueing 10  Demo  -CN      Reps 10  10  -CN      Additional Comments  L1 ball  -CN         Exercise 11    Exercise Name 11  wallslides flex, abd and rainbow standing  -CN      Reps 11  10  -CN      Time 11  5 seconds  -CN         Exercise 12    Exercise Name 12  UBE  -CN      Time 12  4 min  -CN         Exercise 14    Exercise Name 14  SL ER  -CN      Cueing 14  Verbal  -CN      Sets 14  2  -CN      Reps 14  10  -CN      Additional Comments  2#  -CN         Exercise 15    Exercise Name 15  Pulley into flexion  -CN      Cueing 15  Verbal  -CN      Reps 15  15  -CN      Time 15  5 sec  -CN         Exercise 16    Exercise Name 16  Small ball circles at mirror  -CN      Cueing 16  Demo  -CN      Reps 16  10 Cw and CCW  -CN      Additional Comments  L1  ball  -CN         Exercise 17    Exercise Name 17  Wall push ups  -CN      Cueing 17  Demo  -CN      Reps 17  10  -CN         Exercise 18    Exercise Name 18  IR towel stretch  -CN      Cueing 18  Demo  -CN      Reps 18  10  -CN      Time 18  5 sec  -CN        User Key  (r) = Recorded By, (t) = Taken By, (c) = Cosigned By    Initials Name Provider Type    Sumaya Irizarry, PT Physical Therapist                       PT OP Goals     Row Name 06/03/19 1700          PT Short Term Goals    STG Date to Achieve  03/21/19  -CN     STG 1  Pt will be independent and verbalized good understanding of initial HEP to improve ability to manage pain, as well as improve strength and ROM  -CN     STG 1 Progress  Met  -CN     STG 2  Pt will improve L shoulder flexion to at least 145 deg to improve ability to reach overhead.   -CN     STG 2 Progress  Met  -CN        Long Term Goals    LTG Date to Achieve  04/18/19  -CN     LTG 1  Pt will be independent and verbalized good understanding of advanced HEP to improve ability to manage pain, as well as improve strength and ROM.  -CN     LTG 1 Progress  Progressing  -CN     LTG 2  Pt will improve L shoulder flexion to at least 160 deg to improve ability to reach overhead.   -CN     LTG 2 Progress  Progressing  -CN     LTG 2 Progress Comments  Continuing to progress toward full AROM on the L.   -CN     LTG 3  Pt will report </=3/10 pain in L shoulder with overhead activities to improve participation in ADLs.  -CN     LTG 3 Progress  Met  -CN     LTG 4  Pt will improve DASH score to at least </=20% perceived disability to show overall improved quality of life.  -CN     LTG 4 Progress  Met  -CN     LTG 5  Pt will demonstrate improved IR as evidenced by ability to fasten bra without prior stretching 100% of the time in order to return to PLOF.   -CN     LTG 5 Progress  Ongoing  -CN     LTG 5 Progress Comments  Pt reports improved ability to fasten bra, however compensates with R UE.    -DENISE       User Key  (r) = Recorded By, (t) = Taken By, (c) = Cosigned By    Initials Name Provider Type    Sumaya Irizarry, PT Physical Therapist          Therapy Education  Given: Symptoms/condition management, HEP  Program: Progressed  How Provided: Verbal, Demonstration  Provided to: Patient  Level of Understanding: Teach back education performed, Verbalized, Demonstrated              Time Calculation:   Start Time: 1615  Stop Time: 1655  Time Calculation (min): 40 min  Therapy Charges for Today     Code Description Service Date Service Provider Modifiers Qty    05781995511  PT THER PROC EA 15 MIN 6/3/2019 Sumaya Hernandez, PT GP 3                    Sumaya Hernandez PT  6/3/2019

## 2019-06-06 ENCOUNTER — CLINICAL SUPPORT (OUTPATIENT)
Dept: INTERNAL MEDICINE | Facility: CLINIC | Age: 59
End: 2019-06-06

## 2019-06-06 DIAGNOSIS — Z23 NEED FOR SHINGLES VACCINE: Primary | ICD-10-CM

## 2019-06-06 PROCEDURE — 90471 IMMUNIZATION ADMIN: CPT | Performed by: INTERNAL MEDICINE

## 2019-06-10 ENCOUNTER — HOSPITAL ENCOUNTER (OUTPATIENT)
Dept: PHYSICAL THERAPY | Facility: HOSPITAL | Age: 59
Setting detail: THERAPIES SERIES
Discharge: HOME | End: 2019-06-10

## 2019-06-10 DIAGNOSIS — M25.512 LEFT SHOULDER PAIN, UNSPECIFIED CHRONICITY: Primary | ICD-10-CM

## 2019-06-10 PROCEDURE — 97110 THERAPEUTIC EXERCISES: CPT

## 2019-06-10 NOTE — THERAPY DISCHARGE NOTE
Outpatient Physical Therapy Ortho Treatment Note/Discharge Summary   [facility]     Patient Name: Carlie Brennan  : 1960  MRN: [id number]  Today's Date: 6/10/2019      Visit Date: 06/10/2019    Visit Dx:    ICD-10-CM ICD-9-CM   1. Left shoulder pain, unspecified chronicity M25.512 719.41       Patient Active Problem List   Diagnosis   • Essential hypertension   • Gastroesophageal reflux disease without esophagitis   • Mitral valve prolapse   • Rosacea   • Hyperlipidemia   • Health care maintenance   • History of adenomatous polyp of colon   • Meniere's disease of right ear        Past Medical History:   Diagnosis Date   • GERD (gastroesophageal reflux disease)    • Hypertension    • Iron deficiency anemia 2018   • Pregnancy        • Rosacea    • Vertigo         Past Surgical History:   Procedure Laterality Date   • ABDOMINOPLASTY     •  SECTION      x 2   • COLONOSCOPY N/A 3/12/2018    Procedure: COLONOSCOPY INTO CECUM AND NORMAL TI WITH HOT SNARE POLYPECTOMY;  Surgeon: Lincoln Chino MD;  Location: [facility] ENDOSCOPY;  Service: Gastroenterology   • LASIK                                 Exercises     Row Name 06/10/19 1500             Subjective Comments    Subjective Comments  It has been feeling good.   -CN         Subjective Pain    Able to rate subjective pain?  yes  -CN      Pre-Treatment Pain Level  0  -CN         Total Minutes    90693 - PT Therapeutic Exercise Minutes  40  -CN         Exercise 2    Exercise Name 2  tband row  -CN      Sets 2  2  -CN      Reps 2  10  -CN      Additional Comments  BTB  -CN         Exercise 4    Exercise Name 4  tband extension  -CN      Cueing 4  Demo  -CN      Sets 4  2  -CN      Reps 4  10  -CN      Additional Comments  BTB  -CN         Exercise 6    Exercise Name 6  Doorway ER stretch  -CN      Cueing 6  Demo  -CN      Reps 6  10  -CN      Time 6  10  -CN         Exercise 7    Exercise Name 7  Supine star  -CN      Sets 7  2  -CN       Reps 7  10  -CN      Additional Comments  GTB  -CN         Exercise 9    Exercise Name 9  supine serratus punch  -CN      Sets 9  2  -CN      Reps 9  10  -CN      Additional Comments  3# B  -CN         Exercise 11    Exercise Name 11  wallslides flex, abd and rainbow standing  -CN      Reps 11  10  -CN      Time 11  5 seconds  -CN         Exercise 12    Exercise Name 12  UBE  -CN      Time 12  4 min  -CN         Exercise 14    Exercise Name 14  SL ER  -CN      Cueing 14  Verbal  -CN      Sets 14  2  -CN      Reps 14  10  -CN      Additional Comments  2#  -CN         Exercise 15    Exercise Name 15  Pulley into flexion  -CN      Cueing 15  Verbal  -CN      Reps 15  15  -CN      Time 15  5 sec  -CN         Exercise 18    Exercise Name 18  IR towel stretch  -CN      Cueing 18  Demo  -CN      Reps 18  10  -CN      Time 18  5 sec  -CN        User Key  (r) = Recorded By, (t) = Taken By, (c) = Cosigned By    Initials Name Provider Type    Sumaya Irizarry, PT Physical Therapist                         PT OP Goals     Row Name 06/10/19 1500          PT Short Term Goals    STG Date to Achieve  03/21/19  -CN     STG 1  Pt will be independent and verbalized good understanding of initial HEP to improve ability to manage pain, as well as improve strength and ROM  -CN     STG 1 Progress  Met  -CN     STG 2  Pt will improve L shoulder flexion to at least 145 deg to improve ability to reach overhead.   -CN     STG 2 Progress  Met  -CN        Long Term Goals    LTG Date to Achieve  04/18/19  -CN     LTG 1  Pt will be independent and verbalized good understanding of advanced HEP to improve ability to manage pain, as well as improve strength and ROM.  -CN     LTG 1 Progress  Met  -CN     LTG 2  Pt will improve L shoulder flexion to at least 160 deg to improve ability to reach overhead.   -CN     LTG 2 Progress  Partially Met  -CN     LTG 2 Progress Comments  Pt able to perform L shoulder flexion to 157 deg.   -CN     LTG 3   Pt will report </=3/10 pain in L shoulder with overhead activities to improve participation in ADLs.  -CN     LTG 3 Progress  Met  -CN     LTG 4  Pt will improve DASH score to at least </=20% perceived disability to show overall improved quality of life.  -CN     LTG 4 Progress  Met  -CN     LTG 5  Pt will demonstrate improved IR as evidenced by ability to fasten bra without prior stretching 100% of the time in order to return to PLOF.   -CN     LTG 5 Progress  Met  -CN     LTG 5 Progress Comments  Pt reports ability to don bra 100% of the time without difficulty.   -DENISE       User Key  (r) = Recorded By, (t) = Taken By, (c) = Cosigned By    Initials Name Provider Type    Sumaya Irizarry, PT Physical Therapist          Therapy Education  Given: Symptoms/condition management, HEP  Program: Reinforced  How Provided: Verbal, Demonstration, Written  Provided to: Patient  Level of Understanding: Teach back education performed, Verbalized, Demonstrated              Time Calculation:   Start Time: 1533  Stop Time: 1613  Time Calculation (min): 40 min  Therapy Charges for Today     Code Description Service Date Service Provider Modifiers Qty    09114438580 HC PT THER PROC EA 15 MIN 6/10/2019 Sumaya Hernandez, PT GP 3                OP PT Discharge Summary  Date of Discharge: 06/10/19  Reason for Discharge: Maximum functional potential achieved, Independent  Outcomes Achieved: Patient able to partially acheive established goals  Discharge Destination: Home with home program  Discharge Instructions/Additional Comments: Pt attended 22 skilled therapy sessions for treatment of L shoulder pain. Pt with improved ROM and functional abilities with L UE without limitation with work or home tasks. Pt D/C with updated HEP for long term management of symptoms.       Sumaya Hernandez PT  6/10/2019

## 2019-06-25 ENCOUNTER — TELEPHONE (OUTPATIENT)
Dept: ORTHOPEDIC SURGERY | Facility: CLINIC | Age: 59
End: 2019-06-25

## 2019-09-05 ENCOUNTER — LAB (OUTPATIENT)
Dept: INTERNAL MEDICINE | Facility: CLINIC | Age: 59
End: 2019-09-05

## 2019-09-05 DIAGNOSIS — Z00.00 HEALTH CARE MAINTENANCE: ICD-10-CM

## 2019-09-05 LAB
ALBUMIN SERPL-MCNC: 4.5 G/DL (ref 3.5–5.2)
ALBUMIN/GLOB SERPL: 1.3 G/DL
ALP SERPL-CCNC: 78 U/L (ref 39–117)
ALT SERPL W P-5'-P-CCNC: 31 U/L (ref 1–33)
ANION GAP SERPL CALCULATED.3IONS-SCNC: 15.2 MMOL/L (ref 5–15)
AST SERPL-CCNC: 32 U/L (ref 1–32)
BASOPHILS # BLD AUTO: 0.05 10*3/MM3 (ref 0–0.2)
BASOPHILS NFR BLD AUTO: 1 % (ref 0–1.5)
BILIRUB SERPL-MCNC: 0.3 MG/DL (ref 0.2–1.2)
BUN BLD-MCNC: 14 MG/DL (ref 6–20)
BUN/CREAT SERPL: 16.5 (ref 7–25)
CALCIUM SPEC-SCNC: 9.6 MG/DL (ref 8.6–10.5)
CHLORIDE SERPL-SCNC: 99 MMOL/L (ref 98–107)
CHOLEST SERPL-MCNC: 248 MG/DL (ref 0–200)
CO2 SERPL-SCNC: 26.8 MMOL/L (ref 22–29)
CREAT BLD-MCNC: 0.85 MG/DL (ref 0.57–1)
DEPRECATED RDW RBC AUTO: 39.3 FL (ref 37–54)
EOSINOPHIL # BLD AUTO: 0.21 10*3/MM3 (ref 0–0.4)
EOSINOPHIL NFR BLD AUTO: 4.1 % (ref 0.3–6.2)
ERYTHROCYTE [DISTWIDTH] IN BLOOD BY AUTOMATED COUNT: 12.4 % (ref 12.3–15.4)
GFR SERPL CREATININE-BSD FRML MDRD: 69 ML/MIN/1.73
GLOBULIN UR ELPH-MCNC: 3.4 GM/DL
GLUCOSE BLD-MCNC: 100 MG/DL (ref 65–99)
HCT VFR BLD AUTO: 46.2 % (ref 34–46.6)
HDLC SERPL-MCNC: 46 MG/DL (ref 40–60)
HGB BLD-MCNC: 15.8 G/DL (ref 12–15.9)
LDLC SERPL CALC-MCNC: 168 MG/DL (ref 0–100)
LDLC/HDLC SERPL: 3.66 {RATIO}
LYMPHOCYTES # BLD AUTO: 1.84 10*3/MM3 (ref 0.7–3.1)
LYMPHOCYTES NFR BLD AUTO: 35.9 % (ref 19.6–45.3)
MCH RBC QN AUTO: 30.2 PG (ref 26.6–33)
MCHC RBC AUTO-ENTMCNC: 34.2 G/DL (ref 31.5–35.7)
MCV RBC AUTO: 88.2 FL (ref 79–97)
MONOCYTES # BLD AUTO: 0.55 10*3/MM3 (ref 0.1–0.9)
MONOCYTES NFR BLD AUTO: 10.7 % (ref 5–12)
NEUTROPHILS # BLD AUTO: 2.48 10*3/MM3 (ref 1.7–7)
NEUTROPHILS NFR BLD AUTO: 48.3 % (ref 42.7–76)
PLATELET # BLD AUTO: 272 10*3/MM3 (ref 140–450)
PMV BLD AUTO: 9.4 FL (ref 6–12)
POTASSIUM BLD-SCNC: 4.2 MMOL/L (ref 3.5–5.2)
PROT SERPL-MCNC: 7.9 G/DL (ref 6–8.5)
RBC # BLD AUTO: 5.24 10*6/MM3 (ref 3.77–5.28)
SODIUM BLD-SCNC: 141 MMOL/L (ref 136–145)
TRIGL SERPL-MCNC: 169 MG/DL (ref 0–150)
TSH SERPL-ACNC: 3.11 UIU/ML (ref 0.27–4.2)
VLDLC SERPL-MCNC: 33.8 MG/DL (ref 5–40)
WBC NRBC COR # BLD: 5.13 10*3/MM3 (ref 3.4–10.8)

## 2019-09-05 PROCEDURE — 80061 LIPID PANEL: CPT | Performed by: INTERNAL MEDICINE

## 2019-09-05 PROCEDURE — 80053 COMPREHEN METABOLIC PANEL: CPT | Performed by: INTERNAL MEDICINE

## 2019-09-05 PROCEDURE — 85025 COMPLETE CBC W/AUTO DIFF WBC: CPT | Performed by: INTERNAL MEDICINE

## 2019-09-13 ENCOUNTER — OFFICE VISIT (OUTPATIENT)
Dept: INTERNAL MEDICINE | Facility: CLINIC | Age: 59
End: 2019-09-13

## 2019-09-13 VITALS
WEIGHT: 179 LBS | BODY MASS INDEX: 29.82 KG/M2 | RESPIRATION RATE: 14 BRPM | DIASTOLIC BLOOD PRESSURE: 74 MMHG | HEIGHT: 65 IN | SYSTOLIC BLOOD PRESSURE: 120 MMHG

## 2019-09-13 DIAGNOSIS — Z00.00 HEALTH CARE MAINTENANCE: Primary | ICD-10-CM

## 2019-09-13 DIAGNOSIS — H81.01 MENIERE'S DISEASE OF RIGHT EAR: ICD-10-CM

## 2019-09-13 DIAGNOSIS — E78.2 MIXED HYPERLIPIDEMIA: ICD-10-CM

## 2019-09-13 PROCEDURE — 99396 PREV VISIT EST AGE 40-64: CPT | Performed by: INTERNAL MEDICINE

## 2019-09-13 RX ORDER — MECLIZINE HYDROCHLORIDE 25 MG/1
25 TABLET ORAL 3 TIMES DAILY PRN
Qty: 30 TABLET | Refills: 3 | Status: SHIPPED | OUTPATIENT
Start: 2019-09-13 | End: 2021-03-23 | Stop reason: SDUPTHER

## 2019-09-13 NOTE — PROGRESS NOTES
"Subjective   Carlie Brennan is a 59 y.o. female.     History of Present Illness   /74 (BP Location: Left arm, Patient Position: Sitting, Cuff Size: Adult)   Resp 14   Ht 165.1 cm (65\")   Wt 81.2 kg (179 lb)   BMI 29.79 kg/m²   Patient is here for yearly CPE. Last CPE was  1 year ago.  PMH, SH and FH reviewed. Changes in the FH: none .  Patient rates her own health as: good.  Tobacco use: none.  Alcohol use:seldom.  Recreational drugs use: none  Medication list rewieved.  Diet: bacilio be starting back on Weight Watchers tomorrow.  Exercise: none.  Marital status: .   Employment: full time.  Patient rates her stress level as: high.She is taking care of 88 yo mother, her dog had  last month, and patient is going throw a divorse.  Dental health: good. Brushes teeth 1 time a day, flosses 1 time a day . Dental visits: 2 times a year .  Vision correction: reading glasses  Hearing: normal.    Recent vaccinations:   Flu  is up to date and recommended yearly  Tdap  is up to date  Shingles prevention completed    Patient is postmenopausal. Past pregnancies: . Currently patient is on no HRT .      Current Outpatient Medications:   •  meclizine (ANTIVERT) 25 MG tablet, Take 1 tablet by mouth 3 (Three) Times a Day As Needed for dizziness., Disp: 15 tablet, Rfl: 1  •  metroNIDAZOLE (METROGEL) 1 % gel, Apply  topically to the appropriate area on face  as directed., Disp: 60 g, Rfl: PRN  •  naproxen (NAPROSYN) 500 MG tablet, Take 1 tablet by mouth 2 (Two) Times a Day With Meals., Disp: 60 tablet, Rfl: 0  •  omeprazole (PriLOSEC) 20 MG capsule, Take 20 mg by mouth daily as needed., Disp: , Rfl:   •  triamterene-hydrochlorothiazide (MAXZIDE-25) 37.5-25 MG per tablet, Take 1 tablet by mouth Daily., Disp: 30 tablet, Rfl: 11        The following portions of the patient's history were reviewed and updated as appropriate: allergies, current medications, past family history, past medical history, past social " history, past surgical history and problem list.    Review of Systems   Constitutional: Positive for unexpected weight change (martino 20 lbs being off WW). Negative for chills and fever.   HENT: Negative for postnasal drip, sinus pressure and sore throat.    Eyes: Negative for pain and itching.   Respiratory: Negative for cough and chest tightness.    Cardiovascular: Positive for palpitations. Negative for chest pain and leg swelling.   Gastrointestinal: Negative for abdominal pain and blood in stool.   Endocrine: Negative for cold intolerance and heat intolerance.   Genitourinary: Negative for difficulty urinating and flank pain.   Musculoskeletal: Negative for back pain and neck pain.   Skin: Negative for color change and rash.   Neurological: Negative for dizziness, weakness (extemity weakness in legs at times ) and headaches.   Hematological: Negative for adenopathy. Does not bruise/bleed easily.   Psychiatric/Behavioral: Negative for sleep disturbance. The patient is not nervous/anxious.        Objective   Physical Exam   Constitutional: She is oriented to person, place, and time. Vital signs are normal. She appears well-developed. No distress.   overweight   HENT:   Head: Normocephalic and atraumatic.   Right Ear: External ear normal.   Left Ear: External ear normal.   Nose: Nose normal. No mucosal edema. Right sinus exhibits no maxillary sinus tenderness and no frontal sinus tenderness. Left sinus exhibits no maxillary sinus tenderness and no frontal sinus tenderness.   Mouth/Throat: Oropharynx is clear and moist. No oropharyngeal exudate.   Eyes: Conjunctivae, EOM and lids are normal. Pupils are equal, round, and reactive to light. Right eye exhibits no discharge. Left eye exhibits no discharge. Right conjunctiva is not injected. Left conjunctiva is not injected. No scleral icterus. Right eye exhibits normal extraocular motion. Left eye exhibits normal extraocular motion.   Neck: Normal range of motion and  full passive range of motion without pain. Neck supple. No JVD present. Carotid bruit is not present. No thyromegaly present.   Cardiovascular: Normal rate, regular rhythm, S1 normal, S2 normal, normal heart sounds and intact distal pulses. PMI is not displaced. Exam reveals no gallop and no friction rub.   No murmur heard.  Pulmonary/Chest: Effort normal and breath sounds normal. No accessory muscle usage. No respiratory distress. She has no decreased breath sounds. She has no wheezes. She has no rhonchi. She has no rales.   Abdominal: Soft. Bowel sounds are normal. She exhibits no distension, no pulsatile liver, no fluid wave, no abdominal bruit, no ascites and no mass. There is no tenderness. There is no rebound and no guarding.   Musculoskeletal: She exhibits no edema or deformity.   Lymphadenopathy:        Head (right side): No submental, no submandibular, no preauricular, no posterior auricular and no occipital adenopathy present.        Head (left side): No submental, no submandibular, no tonsillar, no preauricular, no posterior auricular and no occipital adenopathy present.     She has no cervical adenopathy.        Right cervical: No superficial cervical, no deep cervical and no posterior cervical adenopathy present.       Left cervical: No superficial cervical, no deep cervical and no posterior cervical adenopathy present.        Right: No supraclavicular adenopathy present.        Left: No supraclavicular adenopathy present.   Neurological: She is alert and oriented to person, place, and time. She has normal strength and normal reflexes. She displays normal reflexes. No cranial nerve deficit. She exhibits normal muscle tone. Coordination normal.   Reflex Scores:       Bicep reflexes are 2+ on the right side and 2+ on the left side.       Patellar reflexes are 2+ on the right side and 2+ on the left side.  Skin: Skin is warm and dry. No rash noted. She is not diaphoretic. No erythema.   Psychiatric: She  has a normal mood and affect. Her speech is normal and behavior is normal. Thought content normal.   Vitals reviewed.      Assessment/Plan   Carlie was seen today for annual exam.    Diagnoses and all orders for this visit:    Health care maintenance    Meniere's disease of right ear  -     meclizine (ANTIVERT) 25 MG tablet; Take 1 tablet by mouth 3 (Three) Times a Day As Needed for dizziness.    Mixed hyperlipidemia  -     Comprehensive Metabolic Panel; Future  -     Lipid Panel; Future        Risk evaluation:  1. Cardiovascular risk factors: hypertension, obesity, sedentary life style   2. Diabetes risk factors: obesity and sedentary life style.  3. Cancer risk factors: personal h/o colon polyp and skin type associated with high risk of skin cancers.  4. Risky behavior: none. Use of seat belts: regular. Use of sunscreens: patient does not get under the sun much. SPF 30.   Tattoos: none.  H/o blood transfusions/organ transplants before 1992 or clotting factor transfusion before 1987: none.     Prevention:  Cholesterol test  up to date. Cholesterol is high. Better diet and weight loss recommended. If not better in 3 months - will need treatment..  Blood sugar test up to date. Fasting blood sugar is mildly elevated. Recommended to avoid sweets and starches: pasta, pizza, bread, potato, corn.  Regular exercise recommended. Weight loss will be very beneficial..  Hep C testing (for patients born [id number]): completed..  Mammogram up to date. Recommended frequency and time of the next screening per GYN.. Breast self exams recommended once a month.  Colonoscopy: up to date. Recommended every 5 years. Next screening is recommended in 2023..  PAP smear : up to date. Recommendations per GYN..  DEXA : up to date. Recommended every 5 years. Next screening is due in 2023..                      Hyperlipidemia, mixed,-marked increase in LDL cholesterol and triglycerides with weight gain.  Patient will change her diet and try to  lose weight.  We will reevaluate in 3 months, if not better-we will have to start medical treatment.  Ménière's disease-doing well.  Overweight-patient had recently gained 20 pounds, your body mass index is up to 29.8.  Agree with Weight Watchers.  I had recommended intermittent fasting-patient is to watch [employer] talk on intermittent fasting.

## 2019-09-13 NOTE — PATIENT INSTRUCTIONS
Risk evaluation:  1. Cardiovascular risk factors: hypertension, obesity, sedentary life style   2. Diabetes risk factors: obesity and sedentary life style.  3. Cancer risk factors: personal h/o colon polyp and skin type associated with high risk of skin cancers.  4. Risky behavior: none. Use of seat belts: regular. Use of sunscreens: patient does not get under the sun much. SPF 30.   Tattoos: none.  H/o blood transfusions/organ transplants before 1992 or clotting factor transfusion before 1987: none.     Prevention:  Cholesterol test  up to date. Cholesterol is high. Better diet and weight loss recommended. If not better in 3 months - will need treatment..  Blood sugar test up to date. Fasting blood sugar is mildly elevated. Recommended to avoid sweets and starches: pasta, pizza, bread, potato, corn.  Regular exercise recommended. Weight loss will be very beneficial..  Hep C testing (for patients born [id number]): completed..  Mammogram up to date. Recommended frequency and time of the next screening per GYN.. Breast self exams recommended once a month.  Colonoscopy: up to date. Recommended every 5 years. Next screening is recommended in 2023..  PAP smear : up to date. Recommendations per GYN..  DEXA : up to date. Recommended every 5 years. Next screening is due in 2023..

## 2019-10-15 ENCOUNTER — APPOINTMENT (OUTPATIENT)
Dept: WOMENS IMAGING | Facility: HOSPITAL | Age: 59
End: 2019-10-15

## 2019-10-15 ENCOUNTER — TELEPHONE (OUTPATIENT)
Dept: INTERNAL MEDICINE | Facility: CLINIC | Age: 59
End: 2019-10-15

## 2019-10-15 PROCEDURE — 77067 SCR MAMMO BI INCL CAD: CPT | Performed by: RADIOLOGY

## 2019-10-15 RX ORDER — PERMETHRIN 50 MG/G
CREAM TOPICAL ONCE
Qty: 60 G | Refills: 3 | Status: SHIPPED | OUTPATIENT
Start: 2019-10-15 | End: 2019-10-24

## 2019-10-15 NOTE — TELEPHONE ENCOUNTER
Ms. Brennan's daughter (Crystal from here) discovered today that her infant daughter,  and Aunt have scabies.  was misdiagnosed by derm in July. Origin is from Aunt's house who cares for Crystal's infant daughter 3 days a week. She was unaware she had them. Ms. Brennan also lives in the home with the Aunt. Derm MD recommends treatment with Permethrin 5% cream. Would like to get RX for herself sent to our [facility]. They are all cleaning the houses, furniture, bedding today and tonight. Crystal does not want anyone here at work to know about this.

## 2019-11-01 DIAGNOSIS — I10 ESSENTIAL HYPERTENSION: ICD-10-CM

## 2019-11-01 DIAGNOSIS — H81.03 MENIERE'S DISEASE OF BOTH EARS: ICD-10-CM

## 2019-11-01 RX ORDER — TRIAMTERENE AND HYDROCHLOROTHIAZIDE 37.5; 25 MG/1; MG/1
1 TABLET ORAL DAILY
Qty: 30 TABLET | Refills: 11 | Status: SHIPPED | OUTPATIENT
Start: 2019-11-01 | End: 2020-11-27 | Stop reason: SDUPTHER

## 2020-01-20 ENCOUNTER — LAB (OUTPATIENT)
Dept: INTERNAL MEDICINE | Facility: CLINIC | Age: 60
End: 2020-01-20

## 2020-01-20 DIAGNOSIS — E78.2 MIXED HYPERLIPIDEMIA: ICD-10-CM

## 2020-01-20 LAB
ALBUMIN SERPL-MCNC: 4.5 G/DL (ref 3.5–5.2)
ALBUMIN/GLOB SERPL: 1.5 G/DL
ALP SERPL-CCNC: 83 U/L (ref 39–117)
ALT SERPL-CCNC: 19 U/L (ref 1–33)
AST SERPL-CCNC: 23 U/L (ref 1–32)
BILIRUB SERPL-MCNC: 0.5 MG/DL (ref 0.2–1.2)
BUN SERPL-MCNC: 16 MG/DL (ref 6–20)
BUN/CREAT SERPL: 17.6 (ref 7–25)
CALCIUM SERPL-MCNC: 9.9 MG/DL (ref 8.6–10.5)
CHLORIDE SERPL-SCNC: 101 MMOL/L (ref 98–107)
CHOLEST SERPL-MCNC: 203 MG/DL (ref 0–200)
CO2 SERPL-SCNC: 28.6 MMOL/L (ref 22–29)
CREAT SERPL-MCNC: 0.91 MG/DL (ref 0.57–1)
GLOBULIN SER CALC-MCNC: 3.1 GM/DL
GLUCOSE SERPL-MCNC: 96 MG/DL (ref 65–99)
HDLC SERPL-MCNC: 41 MG/DL (ref 40–60)
LDLC SERPL CALC-MCNC: 126 MG/DL (ref 0–100)
POTASSIUM SERPL-SCNC: 4.7 MMOL/L (ref 3.5–5.2)
PROT SERPL-MCNC: 7.6 G/DL (ref 6–8.5)
SODIUM SERPL-SCNC: 143 MMOL/L (ref 136–145)
TRIGL SERPL-MCNC: 180 MG/DL (ref 0–150)
VLDLC SERPL-MCNC: 36 MG/DL

## 2020-01-27 ENCOUNTER — OFFICE VISIT (OUTPATIENT)
Dept: INTERNAL MEDICINE | Facility: CLINIC | Age: 60
End: 2020-01-27

## 2020-01-27 VITALS
DIASTOLIC BLOOD PRESSURE: 78 MMHG | BODY MASS INDEX: 28.66 KG/M2 | SYSTOLIC BLOOD PRESSURE: 128 MMHG | HEIGHT: 65 IN | RESPIRATION RATE: 14 BRPM | WEIGHT: 172 LBS

## 2020-01-27 DIAGNOSIS — Z00.00 HEALTH CARE MAINTENANCE: ICD-10-CM

## 2020-01-27 DIAGNOSIS — E78.2 MIXED HYPERLIPIDEMIA: Primary | ICD-10-CM

## 2020-01-27 DIAGNOSIS — E66.3 OVERWEIGHT (BMI 25.0-29.9): ICD-10-CM

## 2020-01-27 PROCEDURE — 99213 OFFICE O/P EST LOW 20 MIN: CPT | Performed by: INTERNAL MEDICINE

## 2020-01-27 NOTE — PATIENT INSTRUCTIONS
1. Mixed hyperlipidemia - improved with weight loss, no interventions needed.  2. Overweight - doing great with better portions control, and more active lifestyle, continue same.

## 2020-01-27 NOTE — PROGRESS NOTES
"Subjective   Carlie Brennan is a 59 y.o. female.     History of Present Illness   /78 (BP Location: Left arm, Patient Position: Sitting, Cuff Size: Adult)   Resp 14   Ht 165.1 cm (65\")   Wt 78 kg (172 lb)   BMI 28.62 kg/m²   Patient is here for weight management. Last time we had addressed this problem 4 months ago. At that time BMI was 29.9. Dietary  and lifestyle changes were discussed and low carb diet was prescribed. Patient had lost 7 lbs since last visit. Today patient's BMI is 28.6.  Patient reports good compliance with strict portions control. Exercise: walks a lot at work.    Patient is here for hyperlipidemia f/u, mixed. On no medication. Last time, in 09/2019 her levels were unusually high. Patient is trying to stay on healthy heart diet.      Current Outpatient Medications:   •  fluorouracil (EFUDEX) 5 % cream, Apply to affected area every night for 2-3 weeks as directed by your doctor, Disp: 40 g, Rfl: 0  •  imiquimod (ALDARA) 5 % cream, Apply to affected area once weekly as directed by your doctor, Disp: 12 each, Rfl: 12  •  meclizine (ANTIVERT) 25 MG tablet, Take 1 tablet by mouth 3 (Three) Times a Day As Needed for dizziness., Disp: 30 tablet, Rfl: 3  •  metroNIDAZOLE (METROGEL) 1 % gel, Apply  topically to the appropriate area on face  as directed., Disp: 60 g, Rfl: PRN  •  metroNIDAZOLE (METROGEL) 1 % gel, Apply topically to face once daily, Disp: 60 g, Rfl: 2  •  omeprazole (PriLOSEC) 20 MG capsule, Take 20 mg by mouth daily as needed., Disp: , Rfl:   •  triamterene-hydrochlorothiazide (MAXZIDE-25) 37.5-25 MG per tablet, Take 1 tablet by mouth Daily., Disp: 30 tablet, Rfl: 11    The following portions of the patient's history were reviewed and updated as appropriate: allergies, current medications, past family history, past medical history, past social history, past surgical history and problem list.    Review of Systems   Constitutional: Negative for chills and fever.   Eyes: Negative " for pain and redness.   Respiratory: Negative for cough and shortness of breath.    Cardiovascular: Negative for chest pain and leg swelling.   Neurological: Negative for dizziness and headaches.       Objective   Physical Exam   Constitutional: She is oriented to person, place, and time. She appears well-developed.   overweight   HENT:   Head: Normocephalic and atraumatic.   Right Ear: Tympanic membrane, external ear and ear canal normal.   Left Ear: Tympanic membrane, external ear and ear canal normal.   Nose: Nose normal. Right sinus exhibits no maxillary sinus tenderness and no frontal sinus tenderness. Left sinus exhibits no maxillary sinus tenderness and no frontal sinus tenderness.   Mouth/Throat: Uvula is midline, oropharynx is clear and moist and mucous membranes are normal.   Eyes: Pupils are equal, round, and reactive to light. Conjunctivae and EOM are normal. Right eye exhibits no discharge. Left eye exhibits no discharge. No scleral icterus.   Neck: Neck supple. No JVD present.   Cardiovascular: Normal rate, regular rhythm and normal heart sounds. Exam reveals no gallop and no friction rub.   No murmur heard.  Pulmonary/Chest: Effort normal and breath sounds normal. She has no wheezes. She has no rales.   Musculoskeletal: She exhibits no edema.   Lymphadenopathy:     She has no cervical adenopathy.   Neurological: She is alert and oriented to person, place, and time. No cranial nerve deficit.   Skin: Skin is warm and dry. No rash noted.   Psychiatric: She has a normal mood and affect. Her behavior is normal.   Vitals reviewed.      Assessment/Plan   Carlie was seen today for weight check and hyperlipidemia.    Diagnoses and all orders for this visit:    Mixed hyperlipidemia    Overweight (BMI 25.0-29.9)    Health care maintenance  -     CBC & Differential; Future  -     Comprehensive Metabolic Panel; Future  -     Lipid Panel; Future  -     TSH; Future  -     Urinalysis With Microscopic If Indicated (No  Culture) - Urine, Clean Catch; Future      1. Mixed hyperlipidemia - improved with weight loss, no interventions needed.  2. Overweight - doing great with better portions control, and more active lifestyle, continue same.

## 2020-02-25 ENCOUNTER — TELEPHONE (OUTPATIENT)
Dept: INTERNAL MEDICINE | Facility: CLINIC | Age: 60
End: 2020-02-25

## 2020-02-25 RX ORDER — FLUTICASONE PROPIONATE 50 MCG
2 SPRAY, SUSPENSION (ML) NASAL DAILY
Qty: 16 G | Refills: 11 | Status: SHIPPED | OUTPATIENT
Start: 2020-02-25 | End: 2020-08-28

## 2020-02-25 NOTE — TELEPHONE ENCOUNTER
The patient is requesting a refill of her Flonase. It was originally prescribed in August of 2018 for sinusitis. She is having symptoms of that again and the spray helped get rid of it the last time. Her call back is [phone]. Thank you.

## 2020-08-28 ENCOUNTER — OFFICE VISIT (OUTPATIENT)
Dept: INTERNAL MEDICINE | Facility: CLINIC | Age: 60
End: 2020-08-28

## 2020-08-28 VITALS
RESPIRATION RATE: 16 BRPM | TEMPERATURE: 98.5 F | DIASTOLIC BLOOD PRESSURE: 83 MMHG | HEART RATE: 83 BPM | HEIGHT: 65 IN | BODY MASS INDEX: 30.16 KG/M2 | SYSTOLIC BLOOD PRESSURE: 140 MMHG | WEIGHT: 181 LBS | OXYGEN SATURATION: 97 %

## 2020-08-28 DIAGNOSIS — I10 ESSENTIAL HYPERTENSION: ICD-10-CM

## 2020-08-28 DIAGNOSIS — H81.03 MENIERE'S DISEASE OF BOTH EARS: Primary | ICD-10-CM

## 2020-08-28 PROCEDURE — 99213 OFFICE O/P EST LOW 20 MIN: CPT | Performed by: NURSE PRACTITIONER

## 2020-08-30 NOTE — PROGRESS NOTES
Subjective   Carlie Brennan is a 60 y.o. female who presents to f/u on Meniere's disease and HTN.    She was diagnosed with Meniere's disease in the right ear in 2018-c/o a persistent tinnitus with intermittent episodes of vertigo. She takes Meclizine as needed for acute flares with good results. She does c/o intermittent episodes of feeling lightheaded the past several weeks which often precedes a flare.     Hypertension   This is a chronic problem. The current episode started more than 1 year ago. The problem is unchanged. The problem is controlled. Pertinent negatives include no chest pain, headaches, palpitations, peripheral edema or shortness of breath. Current antihypertension treatment includes diuretics. The current treatment provides significant improvement. There are no compliance problems.         The following portions of the patient's history were reviewed and updated as appropriate: allergies, current medications, past social history and problem list.    Past Medical History:   Diagnosis Date   • GERD (gastroesophageal reflux disease)    • Hypertension    • Iron deficiency anemia 2018   • Pregnancy        • Rosacea    • Vertigo          Current Outpatient Medications:   •  fluorouracil (EFUDEX) 5 % cream, Apply to affected area every night for 2-3 weeks as directed by your doctor, Disp: 40 g, Rfl: 0  •  imiquimod (ALDARA) 5 % cream, Apply to affected area once weekly as directed by your doctor, Disp: 12 each, Rfl: 12  •  meclizine (ANTIVERT) 25 MG tablet, Take 1 tablet by mouth 3 (Three) Times a Day As Needed for dizziness., Disp: 30 tablet, Rfl: 3  •  metroNIDAZOLE (METROGEL) 1 % gel, Apply  topically to the appropriate area on face  as directed., Disp: 60 g, Rfl: PRN  •  omeprazole (PriLOSEC) 20 MG capsule, Take 20 mg by mouth daily as needed., Disp: , Rfl:   •  triamcinolone (KENALOG) 0.1 % cream, APPLY TO THE AFFECTED AREA AT BEDTIME FOR UP TO  2 WEEKS, Disp: 15 g, Rfl: 0  •   "triamterene-hydrochlorothiazide (MAXZIDE-25) 37.5-25 MG per tablet, Take 1 tablet by mouth Daily., Disp: 30 tablet, Rfl: 11    No Known Allergies    Review of Systems   Constitutional: Negative for chills, fatigue, fever and unexpected weight change.   HENT: Negative for congestion, ear pain, postnasal drip, sinus pressure, sore throat and trouble swallowing.    Eyes: Negative for visual disturbance.   Respiratory: Negative for cough, chest tightness, shortness of breath and wheezing.    Cardiovascular: Negative for chest pain, palpitations and leg swelling.   Gastrointestinal: Negative for abdominal pain, blood in stool, nausea and vomiting.   Genitourinary: Negative for dysuria, frequency and urgency.   Musculoskeletal: Negative for arthralgias and joint swelling.   Skin: Negative for color change.   Neurological: Positive for dizziness and light-headedness. Negative for syncope, weakness and headaches.   Hematological: Does not bruise/bleed easily.   Psychiatric/Behavioral:        BP is elevated in the office today, reports increased stress at work       Objective   Vitals:    08/28/20 0955   BP: 140/83   BP Location: Left arm   Patient Position: Sitting   Cuff Size: Adult   Pulse: 83   Resp: 16   Temp: 98.5 °F (36.9 °C)   TempSrc: Oral   SpO2: 97%   Weight: 82.1 kg (181 lb)   Height: 165.1 cm (65\")     Body mass index is 30.12 kg/m².  Physical Exam   Constitutional: She appears well-developed and well-nourished. She is cooperative. She does not have a sickly appearance. She does not appear ill.   HENT:   Head: Normocephalic.   Right Ear: Hearing, tympanic membrane and external ear normal.   Left Ear: Hearing, tympanic membrane and external ear normal.   Nose: Nose normal. No mucosal edema, rhinorrhea, sinus tenderness or nasal deformity. Right sinus exhibits no maxillary sinus tenderness and no frontal sinus tenderness. Left sinus exhibits no maxillary sinus tenderness and no frontal sinus tenderness. "   Mouth/Throat: Oropharynx is clear and moist and mucous membranes are normal. Normal dentition.   Eyes: Conjunctivae and lids are normal. Right eye exhibits no discharge and no exudate. Left eye exhibits no discharge and no exudate.   Neck: Trachea normal. Carotid bruit is not present. No edema present. No thyroid mass present.   Cardiovascular: Regular rhythm, normal heart sounds and normal pulses.   No murmur heard.  Pulmonary/Chest: Breath sounds normal. No respiratory distress. She has no decreased breath sounds. She has no wheezes. She has no rhonchi. She has no rales.   Abdominal: Soft. Bowel sounds are normal. There is no tenderness.   Lymphadenopathy:        Head (right side): No submental, no submandibular, no tonsillar, no preauricular, no posterior auricular and no occipital adenopathy present.        Head (left side): No submental, no submandibular, no tonsillar, no preauricular, no posterior auricular and no occipital adenopathy present.   Neurological: She is alert.   Skin: Skin is warm, dry and intact. No cyanosis. Nails show no clubbing.       Assessment/Plan   Carlie was seen today for dizziness and hypertension.    Diagnoses and all orders for this visit:    Meniere's disease of both ears    Essential hypertension    1. She has seen audiology to confirm the diagnosis of Meniere's disease, take Meclizine as needed for acute flares.  2. BP is mildly elevated today but improved with repeat, continue to monitor.  FMLA received, will complete due to history of Meniere's disease with acute exacerbations.

## 2020-09-28 ENCOUNTER — OFFICE VISIT (OUTPATIENT)
Dept: INTERNAL MEDICINE | Facility: CLINIC | Age: 60
End: 2020-09-28

## 2020-09-28 VITALS
HEART RATE: 67 BPM | TEMPERATURE: 97.7 F | WEIGHT: 179 LBS | HEIGHT: 65 IN | SYSTOLIC BLOOD PRESSURE: 124 MMHG | BODY MASS INDEX: 29.82 KG/M2 | DIASTOLIC BLOOD PRESSURE: 80 MMHG | OXYGEN SATURATION: 98 %

## 2020-09-28 DIAGNOSIS — I34.1 MITRAL VALVE PROLAPSE: ICD-10-CM

## 2020-09-28 DIAGNOSIS — Z00.00 PHYSICAL EXAM: Primary | ICD-10-CM

## 2020-09-28 DIAGNOSIS — H81.01 MENIERE'S DISEASE OF RIGHT EAR: ICD-10-CM

## 2020-09-28 DIAGNOSIS — L71.9 ROSACEA: ICD-10-CM

## 2020-09-28 DIAGNOSIS — E78.2 MIXED HYPERLIPIDEMIA: ICD-10-CM

## 2020-09-28 PROCEDURE — 99396 PREV VISIT EST AGE 40-64: CPT | Performed by: NURSE PRACTITIONER

## 2020-09-28 NOTE — PROGRESS NOTES
Subjective   Carlie Brennan is a 60 y.o. female who presents for a physical exam.    History of Present Illness     The following portions of the patient's history were reviewed and updated as appropriate: allergies, current medications, past social history and problem list.    Past Medical History:   Diagnosis Date   • GERD (gastroesophageal reflux disease)    • Hypertension    • Iron deficiency anemia 2018   • Pregnancy        • Rosacea    • Vertigo          Current Outpatient Medications:   •  doxycycline (VIBRAMYCIN) 50 MG capsule, Take 1 capsule by mouth Daily., Disp: 30 capsule, Rfl: 11  •  fluorouracil (EFUDEX) 5 % cream, Apply to affected area every night for 2-3 weeks as directed by your doctor, Disp: 40 g, Rfl: 0  •  imiquimod (ALDARA) 5 % cream, Apply to affected area once weekly as directed by your doctor, Disp: 12 each, Rfl: 12  •  meclizine (ANTIVERT) 25 MG tablet, Take 1 tablet by mouth 3 (Three) Times a Day As Needed for dizziness., Disp: 30 tablet, Rfl: 3  •  metroNIDAZOLE (METROGEL) 1 % gel, Apply  topically to the appropriate area on face  as directed., Disp: 60 g, Rfl: PRN  •  omeprazole (PriLOSEC) 20 MG capsule, Take 20 mg by mouth daily as needed., Disp: , Rfl:   •  triamcinolone (KENALOG) 0.1 % cream, APPLY TO THE AFFECTED AREA AT BEDTIME FOR UP TO  2 WEEKS, Disp: 15 g, Rfl: 0  •  triamterene-hydrochlorothiazide (MAXZIDE-25) 37.5-25 MG per tablet, Take 1 tablet by mouth Daily., Disp: 30 tablet, Rfl: 11    No Known Allergies    Review of Systems   Constitutional: Negative for activity change, appetite change, chills, diaphoresis, fatigue, fever and unexpected weight change.   HENT: Negative for congestion, dental problem, drooling, ear discharge, ear pain, facial swelling, hearing loss, mouth sores, nosebleeds, postnasal drip, rhinorrhea, sinus pressure, sore throat, tinnitus and trouble swallowing.    Eyes: Negative for photophobia, pain, discharge, redness, itching and visual  "disturbance.   Respiratory: Negative for apnea, cough, choking, chest tightness, shortness of breath and wheezing.    Cardiovascular: Positive for palpitations (c/o intermittent palpitations (about once a month)). Negative for chest pain and leg swelling.        No orthopnea, PND, COLON   Gastrointestinal: Negative for abdominal pain, blood in stool, constipation, diarrhea, nausea and vomiting.        Takes Prilosec as needed for reflux symptoms   Endocrine: Negative for cold intolerance, heat intolerance, polydipsia and polyuria.   Genitourinary: Negative for decreased urine volume, dysuria, enuresis, flank pain, frequency, hematuria and urgency.   Musculoskeletal: Negative for arthralgias, back pain, gait problem, joint swelling, myalgias, neck pain and neck stiffness.   Skin: Negative for color change and rash.        No hair changes, no nail changes   Allergic/Immunologic: Negative for environmental allergies, food allergies and immunocompromised state.   Neurological: Positive for dizziness and light-headedness. Negative for tremors, seizures, syncope, speech difficulty, weakness, numbness and headaches.        Hx Meniere's disease with intermittent exacerbations, improved with Meclizine   Hematological: Negative for adenopathy. Does not bruise/bleed easily.   Psychiatric/Behavioral: Negative for agitation, confusion, decreased concentration, dysphoric mood, sleep disturbance and suicidal ideas. The patient is not nervous/anxious.        Objective   Vitals:    09/28/20 0835   BP: 124/80   BP Location: Left arm   Patient Position: Sitting   Cuff Size: Adult   Pulse: 67   Temp: 97.7 °F (36.5 °C)   TempSrc: Oral   SpO2: 98%   Weight: 81.2 kg (179 lb)   Height: 165.1 cm (65\")     Body mass index is 29.79 kg/m².  Physical Exam  Constitutional:       General: She is not in acute distress.     Appearance: Normal appearance. She is not diaphoretic.   HENT:      Head: Normocephalic and atraumatic.      Right Ear: Tympanic " membrane, ear canal and external ear normal.      Left Ear: Tympanic membrane, ear canal and external ear normal.      Nose: Nose normal. No rhinorrhea.      Mouth/Throat:      Mouth: Mucous membranes are moist.      Pharynx: Oropharynx is clear.   Eyes:      General:         Right eye: No discharge.         Left eye: No discharge.      Conjunctiva/sclera: Conjunctivae normal.   Neck:      Musculoskeletal: Normal range of motion.   Cardiovascular:      Rate and Rhythm: Normal rate and regular rhythm.      Pulses: Normal pulses.      Heart sounds: Normal heart sounds.   Pulmonary:      Effort: Pulmonary effort is normal.      Breath sounds: Normal breath sounds.   Chest:      Breasts:         Right: Normal. No swelling, mass, skin change or tenderness.         Left: Normal. No swelling, mass, skin change or tenderness.   Abdominal:      General: Bowel sounds are normal.      Tenderness: There is no abdominal tenderness.   Musculoskeletal:         General: No swelling or tenderness.   Skin:     General: Skin is warm and dry.   Neurological:      General: No focal deficit present.      Mental Status: She is alert and oriented to person, place, and time.   Psychiatric:         Mood and Affect: Mood normal.         Behavior: Behavior normal.         Judgment: Judgment normal.         Assessment/Plan   Carlie was seen today for annual exam.    Diagnoses and all orders for this visit:    Physical exam  -     CBC Auto Differential  -     Comprehensive Metabolic Panel  -     Lipid Panel  -     TSH  -     Urinalysis With Microscopic If Indicated (No Culture) - Urine, Clean Catch    Meniere's disease of right ear    Rosacea    Mitral valve prolapse    Mixed hyperlipidemia    Risk assessment:  Her BMI is 29.79. She has started intermittent fasting which has been helpful. She is active at her job and is trying to increase her activity level at home.    Prevention:  She will receive her flu vaccine at work. Tdap is current. She  has received the Shingrix series.  She is followed by GYN for pap smears, mammograms and bone density scans.  Her next colonoscopy is due in 3/2023.    Discussed healthy lifestyle choices such as maintaining a balanced diet low in carbohydrates and limiting caffeine and alcohol intake.  Recommended routine exercise for bone strength and cardiovascular health.

## 2020-10-21 ENCOUNTER — TELEPHONE (OUTPATIENT)
Dept: INTERNAL MEDICINE | Facility: CLINIC | Age: 60
End: 2020-10-21

## 2020-10-21 ENCOUNTER — OFFICE VISIT (OUTPATIENT)
Dept: INTERNAL MEDICINE | Facility: CLINIC | Age: 60
End: 2020-10-21

## 2020-10-21 VITALS — HEIGHT: 65 IN | BODY MASS INDEX: 29.79 KG/M2

## 2020-10-21 DIAGNOSIS — J02.9 PHARYNGITIS, UNSPECIFIED ETIOLOGY: Primary | ICD-10-CM

## 2020-10-21 PROCEDURE — 99442 PR PHYS/QHP TELEPHONE EVALUATION 11-20 MIN: CPT | Performed by: NURSE PRACTITIONER

## 2020-10-21 RX ORDER — AMOXICILLIN 875 MG/1
875 TABLET, COATED ORAL EVERY 12 HOURS SCHEDULED
Qty: 14 TABLET | Refills: 0 | Status: SHIPPED | OUTPATIENT
Start: 2020-10-21 | End: 2020-10-28

## 2020-10-21 NOTE — PROGRESS NOTES
Subjective   Carlie Brennan is a 60 y.o. female.         History of Present Illness     The following portions of the patient's history were reviewed and updated as appropriate: allergies, current medications, past social history and problem list.    Past Medical History:   Diagnosis Date   • GERD (gastroesophageal reflux disease)    • Hypertension    • Iron deficiency anemia 2018   • Pregnancy        • Rosacea    • Vertigo          Current Outpatient Medications:   •  doxycycline (VIBRAMYCIN) 50 MG capsule, Take 1 capsule by mouth Daily., Disp: 30 capsule, Rfl: 11  •  fluorouracil (EFUDEX) 5 % cream, Apply to affected area every night for 2-3 weeks as directed by your doctor, Disp: 40 g, Rfl: 0  •  imiquimod (ALDARA) 5 % cream, Apply to affected area once weekly as directed by your doctor, Disp: 12 each, Rfl: 12  •  meclizine (ANTIVERT) 25 MG tablet, Take 1 tablet by mouth 3 (Three) Times a Day As Needed for dizziness., Disp: 30 tablet, Rfl: 3  •  metroNIDAZOLE (METROGEL) 1 % gel, Apply  topically to the appropriate area on face  as directed., Disp: 60 g, Rfl: PRN  •  omeprazole (PriLOSEC) 20 MG capsule, Take 20 mg by mouth daily as needed., Disp: , Rfl:   •  triamcinolone (KENALOG) 0.1 % cream, APPLY TO THE AFFECTED AREA AT BEDTIME FOR UP TO  2 WEEKS, Disp: 15 g, Rfl: 0  •  triamterene-hydrochlorothiazide (MAXZIDE-25) 37.5-25 MG per tablet, Take 1 tablet by mouth Daily., Disp: 30 tablet, Rfl: 11  •  amoxicillin (AMOXIL) 875 MG tablet, Take 1 tablet by mouth Every 12 (Twelve) Hours for 7 days., Disp: 14 tablet, Rfl: 0    No Known Allergies    Review of Systems   Constitutional: Positive for fatigue. Negative for activity change, appetite change, chills, fever and unexpected weight change.   HENT: Positive for congestion, postnasal drip, rhinorrhea, sinus pressure, sneezing and sore throat. Negative for drooling, ear pain, facial swelling, hearing loss, mouth sores, nosebleeds, trouble swallowing and voice  "change.    Eyes: Negative for pain, discharge, itching and visual disturbance.   Respiratory: Positive for cough and chest tightness. Negative for choking, shortness of breath and wheezing.    Cardiovascular: Negative for chest pain and palpitations.   Gastrointestinal: Negative for abdominal pain, constipation, diarrhea, nausea and vomiting.   Endocrine: Negative for polyuria.   Genitourinary: Negative for dysuria and frequency.   Musculoskeletal: Negative for back pain and joint swelling.       Objective   Vitals:    10/21/20 1025   Height: 165.1 cm (65\")     Body mass index is 29.79 kg/m².  Physical Exam  Psychiatric:         Behavior: Behavior normal.         Thought Content: Thought content normal.         Judgment: Judgment normal.         Assessment/Plan   Diagnoses and all orders for this visit:    1. Pharyngitis, unspecified etiology (Primary)  -     amoxicillin (AMOXIL) 875 MG tablet; Take 1 tablet by mouth Every 12 (Twelve) Hours for 7 days.  Dispense: 14 tablet; Refill: 0    History and medical judgement obtained from phone conversation with patient. No physical examination was performed. Approximately 11 minutes spent in phone conversation with patient.       You have chosen to receive care through a telephone visit. Do you consent to use a telephone visit for your medical care today? Yes  "

## 2020-10-28 ENCOUNTER — APPOINTMENT (OUTPATIENT)
Dept: WOMENS IMAGING | Facility: HOSPITAL | Age: 60
End: 2020-10-28

## 2020-10-28 PROCEDURE — 77067 SCR MAMMO BI INCL CAD: CPT | Performed by: RADIOLOGY

## 2020-10-28 PROCEDURE — 77063 BREAST TOMOSYNTHESIS BI: CPT | Performed by: RADIOLOGY

## 2020-12-16 DIAGNOSIS — H81.03 MENIERE'S DISEASE OF BOTH EARS: ICD-10-CM

## 2020-12-16 DIAGNOSIS — I10 ESSENTIAL HYPERTENSION: ICD-10-CM

## 2020-12-16 RX ORDER — TRIAMTERENE AND HYDROCHLOROTHIAZIDE 37.5; 25 MG/1; MG/1
1 TABLET ORAL DAILY
Qty: 30 TABLET | Refills: 11 | Status: SHIPPED | OUTPATIENT
Start: 2020-12-16 | End: 2022-01-03

## 2021-01-01 ENCOUNTER — IMMUNIZATION (OUTPATIENT)
Dept: VACCINE CLINIC | Facility: HOSPITAL | Age: 61
End: 2021-01-01

## 2021-01-01 PROCEDURE — 91300 HC SARSCOV02 VAC 30MCG/0.3ML IM: CPT | Performed by: INTERNAL MEDICINE

## 2021-01-01 PROCEDURE — 0001A: CPT | Performed by: INTERNAL MEDICINE

## 2021-01-22 ENCOUNTER — IMMUNIZATION (OUTPATIENT)
Dept: VACCINE CLINIC | Facility: HOSPITAL | Age: 61
End: 2021-01-22

## 2021-01-22 PROCEDURE — 0002A: CPT | Performed by: INTERNAL MEDICINE

## 2021-01-22 PROCEDURE — 91300 HC SARSCOV02 VAC 30MCG/0.3ML IM: CPT | Performed by: INTERNAL MEDICINE

## 2021-03-23 DIAGNOSIS — H81.01 MENIERE'S DISEASE OF RIGHT EAR: ICD-10-CM

## 2021-03-23 RX ORDER — MECLIZINE HYDROCHLORIDE 25 MG/1
25 TABLET ORAL 3 TIMES DAILY PRN
Qty: 30 TABLET | Refills: 3 | Status: SHIPPED | OUTPATIENT
Start: 2021-03-23 | End: 2022-03-02 | Stop reason: SDUPTHER

## 2021-07-07 ENCOUNTER — OFFICE VISIT (OUTPATIENT)
Dept: INTERNAL MEDICINE | Facility: CLINIC | Age: 61
End: 2021-07-07

## 2021-07-07 VITALS
BODY MASS INDEX: 29.99 KG/M2 | SYSTOLIC BLOOD PRESSURE: 118 MMHG | WEIGHT: 180 LBS | TEMPERATURE: 97.8 F | HEART RATE: 74 BPM | DIASTOLIC BLOOD PRESSURE: 74 MMHG | OXYGEN SATURATION: 98 % | HEIGHT: 65 IN

## 2021-07-07 DIAGNOSIS — J01.90 ACUTE NON-RECURRENT SINUSITIS, UNSPECIFIED LOCATION: Primary | ICD-10-CM

## 2021-07-07 PROCEDURE — 99213 OFFICE O/P EST LOW 20 MIN: CPT | Performed by: NURSE PRACTITIONER

## 2021-07-07 RX ORDER — AMOXICILLIN 875 MG/1
875 TABLET, COATED ORAL EVERY 12 HOURS SCHEDULED
Qty: 14 TABLET | Refills: 0 | Status: SHIPPED | OUTPATIENT
Start: 2021-07-07 | End: 2021-07-14

## 2021-07-07 NOTE — PROGRESS NOTES
"Chief Complaint  Sinus Problem    Subjective          Carlie Brennan presents to [facility] who presents due to respiratory symptoms.    She c/o increased sinus pressure and drainage for the past several weeks, now c/o facial pain and pressure without fever or chills. She is taking Mucinex for increased drainage with mild improvement.      Objective   Vital Signs:   /74 (BP Location: Left arm, Patient Position: Sitting, Cuff Size: Adult)   Pulse 74   Temp 97.8 °F (36.6 °C) (Temporal)   Ht 165.1 cm (65\")   Wt 81.6 kg (180 lb)   SpO2 98%   BMI 29.95 kg/m²     Physical Exam  Vitals and nursing note reviewed.   Constitutional:       Appearance: She is well-developed. She is not ill-appearing.   HENT:      Head: Normocephalic.      Right Ear: Hearing and external ear normal. No decreased hearing noted. No drainage, swelling or tenderness. No middle ear effusion. Tympanic membrane is bulging. Tympanic membrane is not erythematous.      Left Ear: Hearing and external ear normal. No decreased hearing noted. No drainage, swelling or tenderness.  No middle ear effusion. Tympanic membrane is bulging. Tympanic membrane is not erythematous.      Nose: No nasal deformity, mucosal edema or rhinorrhea.      Right Sinus: Frontal sinus tenderness present. No maxillary sinus tenderness.      Left Sinus: Frontal sinus tenderness present. No maxillary sinus tenderness.      Mouth/Throat:      Pharynx: Posterior oropharyngeal erythema present.   Eyes:      General: Lids are normal.      Conjunctiva/sclera: Conjunctivae normal.   Neck:      Trachea: Trachea normal.   Cardiovascular:      Rate and Rhythm: Regular rhythm.      Pulses: Normal pulses.      Heart sounds: Normal heart sounds. No murmur heard.     Pulmonary:      Effort: No respiratory distress.      Breath sounds: Normal breath sounds. No decreased breath sounds, wheezing, rhonchi or rales.   Lymphadenopathy:      Cervical: No " cervical adenopathy.   Skin:     General: Skin is warm and dry.   Neurological:      Mental Status: She is alert.   Psychiatric:         Behavior: Behavior is cooperative.        Result Review :   The following data was reviewed by: RAY Mobley on 07/07/2021:                Assessment and Plan    Diagnoses and all orders for this visit:    1. Acute non-recurrent sinusitis, unspecified location (Primary)  -     amoxicillin (AMOXIL) 875 MG tablet; Take 1 tablet by mouth Every 12 (Twelve) Hours for 7 days.  Dispense: 14 tablet; Refill: 0  -     guaiFENesin (Mucinex) 600 MG 12 hr tablet; Take 1 tablet by mouth Every 12 (Twelve) Hours for 7 days.  Dispense: 14 tablet    She will continue Mucinex, may add nasal saline for pressure and drainage. RTC if sx persist or worsen.      Follow Up   Return if symptoms worsen or fail to improve, for Next scheduled follow up.  Patient was given instructions and counseling regarding her condition or for health maintenance advice. Please see specific information pulled into the AVS if appropriate.       Answers for HPI/ROS submitted by the patient on 7/7/2021  Please describe your symptoms.: Sinus pressure, sneezing, runny nose, and headache. Also have sore throat with white spots for a few weeks.  Have you had these symptoms before?: Yes  How long have you been having these symptoms?: 1-4 days  Please list any medications you are currently taking for this condition.: OTC Mucinex 12 hour  What is the primary reason for your visit?: Other

## 2021-09-30 ENCOUNTER — OFFICE VISIT (OUTPATIENT)
Dept: INTERNAL MEDICINE | Facility: CLINIC | Age: 61
End: 2021-09-30

## 2021-09-30 VITALS
WEIGHT: 184.6 LBS | TEMPERATURE: 98.2 F | BODY MASS INDEX: 30.75 KG/M2 | DIASTOLIC BLOOD PRESSURE: 78 MMHG | HEIGHT: 65 IN | SYSTOLIC BLOOD PRESSURE: 133 MMHG | OXYGEN SATURATION: 99 % | HEART RATE: 75 BPM

## 2021-09-30 DIAGNOSIS — Z23 NEED FOR INFLUENZA VACCINATION: ICD-10-CM

## 2021-09-30 DIAGNOSIS — K21.9 GASTROESOPHAGEAL REFLUX DISEASE WITHOUT ESOPHAGITIS: ICD-10-CM

## 2021-09-30 DIAGNOSIS — Z00.00 PHYSICAL EXAM: Primary | ICD-10-CM

## 2021-09-30 DIAGNOSIS — I10 ESSENTIAL HYPERTENSION: ICD-10-CM

## 2021-09-30 DIAGNOSIS — L71.9 ROSACEA: Chronic | ICD-10-CM

## 2021-09-30 PROBLEM — H57.9 EYE PRESSURE: Status: ACTIVE | Noted: 2021-09-30

## 2021-09-30 LAB
ALBUMIN SERPL-MCNC: 4.4 G/DL (ref 3.5–5.2)
ALBUMIN/GLOB SERPL: 1.8 G/DL
ALP SERPL-CCNC: 69 U/L (ref 39–117)
ALT SERPL-CCNC: 42 U/L (ref 1–33)
APPEARANCE UR: CLEAR
AST SERPL-CCNC: 43 U/L (ref 1–32)
BILIRUB SERPL-MCNC: 0.4 MG/DL (ref 0–1.2)
BILIRUB UR QL STRIP: NEGATIVE
BUN SERPL-MCNC: 13 MG/DL (ref 6–20)
BUN/CREAT SERPL: 13.8 (ref 7–25)
CALCIUM SERPL-MCNC: 9.6 MG/DL (ref 8.6–10.5)
CHLORIDE SERPL-SCNC: 103 MMOL/L (ref 98–107)
CHOLEST SERPL-MCNC: 209 MG/DL (ref 0–200)
CO2 SERPL-SCNC: 29.5 MMOL/L (ref 22–29)
COLOR UR: YELLOW
CREAT SERPL-MCNC: 0.94 MG/DL (ref 0.57–1)
ERYTHROCYTE [DISTWIDTH] IN BLOOD BY AUTOMATED COUNT: 13.1 % (ref 12.3–15.4)
GLOBULIN SER CALC-MCNC: 2.5 GM/DL
GLUCOSE SERPL-MCNC: 97 MG/DL (ref 65–99)
GLUCOSE UR QL: NEGATIVE
HCT VFR BLD AUTO: 48.4 % (ref 34–46.6)
HDLC SERPL-MCNC: 35 MG/DL (ref 40–60)
HGB BLD-MCNC: 15.8 G/DL (ref 12–15.9)
HGB UR QL STRIP: NEGATIVE
KETONES UR QL STRIP: NEGATIVE
LDLC SERPL CALC-MCNC: 134 MG/DL (ref 0–100)
LEUKOCYTE ESTERASE UR QL STRIP: NEGATIVE
MCH RBC QN AUTO: 30 PG (ref 26.6–33)
MCHC RBC AUTO-ENTMCNC: 32.6 G/DL (ref 31.5–35.7)
MCV RBC AUTO: 91.8 FL (ref 79–97)
NITRITE UR QL STRIP: NEGATIVE
PH UR STRIP: 7 [PH] (ref 5–8)
PLATELET # BLD AUTO: 261 10*3/MM3 (ref 140–450)
POTASSIUM SERPL-SCNC: 4.3 MMOL/L (ref 3.5–5.2)
PROT SERPL-MCNC: 6.9 G/DL (ref 6–8.5)
PROT UR QL STRIP: ABNORMAL
RBC # BLD AUTO: 5.27 10*6/MM3 (ref 3.77–5.28)
SODIUM SERPL-SCNC: 143 MMOL/L (ref 136–145)
SP GR UR: 1.02 (ref 1–1.03)
TRIGL SERPL-MCNC: 224 MG/DL (ref 0–150)
TSH SERPL DL<=0.005 MIU/L-ACNC: 2.03 UIU/ML (ref 0.27–4.2)
UROBILINOGEN UR STRIP-MCNC: ABNORMAL MG/DL
VLDLC SERPL CALC-MCNC: 40 MG/DL (ref 5–40)
WBC # BLD AUTO: 4.4 10*3/MM3 (ref 3.4–10.8)

## 2021-09-30 PROCEDURE — 90686 IIV4 VACC NO PRSV 0.5 ML IM: CPT | Performed by: NURSE PRACTITIONER

## 2021-09-30 PROCEDURE — 90471 IMMUNIZATION ADMIN: CPT | Performed by: NURSE PRACTITIONER

## 2021-09-30 PROCEDURE — 99396 PREV VISIT EST AGE 40-64: CPT | Performed by: NURSE PRACTITIONER

## 2021-09-30 NOTE — PROGRESS NOTES
Subjective   Carlie Brennan is a 61 y.o. female who presents for a physical exam.    History of Present Illness     The following portions of the patient's history were reviewed and updated as appropriate: allergies, current medications, past social history and problem list.    Past Medical History:   Diagnosis Date   • GERD (gastroesophageal reflux disease)    • Hypertension    • Iron deficiency anemia 2018   • Pregnancy        • Rosacea    • Vertigo        Current Outpatient Medications:   •  doxycycline (VIBRAMYCIN) 50 MG capsule, Take 1 capsule by mouth Daily., Disp: 30 capsule, Rfl: 11  •  meclizine (ANTIVERT) 25 MG tablet, Take 1 tablet by mouth 3 (Three) Times a Day As Needed for Dizziness., Disp: 30 tablet, Rfl: 3  •  metroNIDAZOLE (METROGEL) 1 % gel, Apply  topically to the appropriate area on face  as directed., Disp: 60 g, Rfl: PRN  •  omeprazole (PriLOSEC) 20 MG capsule, Take 20 mg by mouth daily as needed., Disp: , Rfl:   •  triamterene-hydrochlorothiazide (MAXZIDE-25) 37.5-25 MG per tablet, Take 1 tablet by mouth Daily., Disp: 30 tablet, Rfl: 11    No Known Allergies    Review of Systems   Constitutional: Negative for activity change, appetite change, chills, diaphoresis, fatigue, fever and unexpected weight change.   HENT: Negative for congestion, dental problem, drooling, ear discharge, ear pain, facial swelling, hearing loss, mouth sores, nosebleeds, postnasal drip, rhinorrhea, sinus pressure, sore throat, tinnitus and trouble swallowing.    Eyes: Positive for visual disturbance (wears glasses, no vision changes). Negative for photophobia, pain, discharge, redness and itching.   Respiratory: Negative for apnea, cough, choking, chest tightness, shortness of breath and wheezing.    Cardiovascular: Negative for chest pain, palpitations and leg swelling.        No orthopnea, PND, COLON   Gastrointestinal: Negative for abdominal pain, blood in stool, constipation, diarrhea, nausea and vomiting.  "  Endocrine: Negative for cold intolerance, heat intolerance, polydipsia and polyuria.   Genitourinary: Negative for decreased urine volume, dysuria, enuresis, flank pain, frequency, hematuria and urgency.   Musculoskeletal: Negative for arthralgias, back pain, gait problem, joint swelling, myalgias, neck pain and neck stiffness.   Skin: Negative for color change and rash.        No hair changes, no nail changes   Allergic/Immunologic: Negative for environmental allergies, food allergies and immunocompromised state.   Neurological: Negative for dizziness, tremors, seizures, syncope, speech difficulty, weakness, light-headedness, numbness and headaches.   Hematological: Negative for adenopathy. Does not bruise/bleed easily.   Psychiatric/Behavioral: Negative for agitation, confusion, decreased concentration, dysphoric mood, sleep disturbance and suicidal ideas. The patient is not nervous/anxious.        Objective   Vitals:    09/30/21 0801   BP: 133/78   BP Location: Left arm   Patient Position: Sitting   Cuff Size: Adult   Pulse: 75   Temp: 98.2 °F (36.8 °C)   TempSrc: Temporal   SpO2: 99%   Weight: 83.7 kg (184 lb 9.6 oz)   Height: 165.1 cm (65\")     Body mass index is 30.72 kg/m².  Physical Exam    Assessment/Plan   Diagnoses and all orders for this visit:    1. Physical exam (Primary)  -     CBC (No Diff)  -     Comprehensive Metabolic Panel  -     Lipid Panel  -     TSH  -     Urinalysis With Microscopic If Indicated (No Culture) - Urine, Clean Catch    2. Essential hypertension  Assessment & Plan:  BP is well-controlled with Triamterene-HCTZ which she will continue along with a low sodium diet.      3. Gastroesophageal reflux disease without esophagitis  Assessment & Plan:  Sx controlled with Omeprazole as needed      4. Rosacea  Assessment & Plan:  Followed by dermatology-managed with Metronidazole cream        Risk assessment:  Her BP has been well-controlled on Triamterene-HCTZ daily.  Her Body mass index is " 30.72 kg/m². - She tries to exercise regularly and follow a low-fat, low-cholesterol diet (cholesterol improved previously with weight loss).    Prevention:  She has not received her annual flu vaccine, administered today. Tdap is current.  Colonoscopy is due in 2023.   She has received the COVID-19 and Shingrix vaccine series.    Discussed healthy lifestyle choices such as maintaining a balanced diet low in carbohydrates and limiting caffeine and alcohol intake.  Recommended routine exercise for bone strength and cardiovascular health.

## 2022-01-03 DIAGNOSIS — H81.03 MENIERE'S DISEASE OF BOTH EARS: ICD-10-CM

## 2022-01-03 DIAGNOSIS — I10 ESSENTIAL HYPERTENSION: ICD-10-CM

## 2022-01-03 RX ORDER — TRIAMTERENE AND HYDROCHLOROTHIAZIDE 37.5; 25 MG/1; MG/1
TABLET ORAL
Qty: 30 TABLET | Refills: 11 | Status: SHIPPED | OUTPATIENT
Start: 2022-01-03 | End: 2022-10-04

## 2022-01-04 ENCOUNTER — APPOINTMENT (OUTPATIENT)
Dept: WOMENS IMAGING | Facility: HOSPITAL | Age: 62
End: 2022-01-04

## 2022-01-04 PROCEDURE — 77067 SCR MAMMO BI INCL CAD: CPT | Performed by: RADIOLOGY

## 2022-01-04 PROCEDURE — 77063 BREAST TOMOSYNTHESIS BI: CPT | Performed by: RADIOLOGY

## 2022-03-02 ENCOUNTER — OFFICE VISIT (OUTPATIENT)
Dept: INTERNAL MEDICINE | Facility: CLINIC | Age: 62
End: 2022-03-02

## 2022-03-02 VITALS
DIASTOLIC BLOOD PRESSURE: 78 MMHG | OXYGEN SATURATION: 98 % | HEIGHT: 65 IN | TEMPERATURE: 98.4 F | BODY MASS INDEX: 28.16 KG/M2 | WEIGHT: 169 LBS | SYSTOLIC BLOOD PRESSURE: 118 MMHG | HEART RATE: 86 BPM

## 2022-03-02 DIAGNOSIS — E66.3 OVERWEIGHT (BMI 25.0-29.9): Chronic | ICD-10-CM

## 2022-03-02 DIAGNOSIS — R42 LIGHTHEADEDNESS: Primary | ICD-10-CM

## 2022-03-02 DIAGNOSIS — H81.01 MENIERE'S DISEASE OF RIGHT EAR: ICD-10-CM

## 2022-03-02 PROCEDURE — 99214 OFFICE O/P EST MOD 30 MIN: CPT | Performed by: NURSE PRACTITIONER

## 2022-03-02 RX ORDER — MECLIZINE HCL 12.5 MG/1
12.5 TABLET ORAL 3 TIMES DAILY PRN
Qty: 30 TABLET | Refills: 0 | Status: SHIPPED | OUTPATIENT
Start: 2022-03-02

## 2022-03-02 NOTE — PROGRESS NOTES
"Chief Complaint  Dizziness and Weight Loss    Subjective          Carlie Brennan presents to [facility] due to lightheadedness and recent weight loss.    She has joined  and has lost alaina 21 pounds by making lifestyle changes. She reports feeling well with an improved energy level.  She has been under increased stress with her mother's recent death after a prolonged illness. She c/o intermittent loose, watery stools without abdominal pain and/or rectal bleeding. She has had lightheadedness intermittently without chest pain and/or shortness of breath.      Objective   Vital Signs:   /78 (BP Location: Left arm, Patient Position: Sitting, Cuff Size: Adult)   Pulse 86   Temp 98.4 °F (36.9 °C) (Temporal)   Ht 165.1 cm (65\")   Wt 76.7 kg (169 lb)   SpO2 98%   BMI 28.12 kg/m²     Physical Exam  Constitutional:       Appearance: She is well-developed. She is not ill-appearing.   HENT:      Head: Normocephalic.      Right Ear: Hearing, tympanic membrane and external ear normal.      Left Ear: Hearing, tympanic membrane and external ear normal.      Nose: Nose normal. No nasal deformity, mucosal edema or rhinorrhea.      Right Sinus: No maxillary sinus tenderness or frontal sinus tenderness.      Left Sinus: No maxillary sinus tenderness or frontal sinus tenderness.      Mouth/Throat:      Dentition: Normal dentition.   Eyes:      General: Lids are normal.         Right eye: No discharge.         Left eye: No discharge.      Conjunctiva/sclera: Conjunctivae normal.      Right eye: No exudate.     Left eye: No exudate.  Neck:      Thyroid: No thyroid mass or thyromegaly.      Vascular: No carotid bruit.      Trachea: Trachea normal.   Cardiovascular:      Rate and Rhythm: Regular rhythm.      Pulses: Normal pulses.      Heart sounds: Normal heart sounds. No murmur heard.  Pulmonary:      Effort: No respiratory distress.      Breath sounds: Normal breath sounds. No decreased breath " sounds, wheezing, rhonchi or rales.   Abdominal:      General: Bowel sounds are normal.      Palpations: Abdomen is soft.      Tenderness: There is no abdominal tenderness.   Musculoskeletal:      Cervical back: Normal range of motion. No edema.   Lymphadenopathy:      Head:      Right side of head: No submental, submandibular, tonsillar, preauricular, posterior auricular or occipital adenopathy.      Left side of head: No submental, submandibular, tonsillar, preauricular, posterior auricular or occipital adenopathy.   Skin:     General: Skin is warm and dry.      Nails: There is no clubbing.   Neurological:      Mental Status: She is alert.   Psychiatric:         Behavior: Behavior is cooperative.        Result Review :   The following data was reviewed by: RAY Mobley on 03/02/2022:  Common labs    Common Labsle 9/30/21 9/30/21 9/30/21    0905 0905 0905   Glucose  97    BUN  13    Creatinine  0.94    eGFR Non  Am  61    eGFR  Am  74    Sodium  143    Potassium  4.3    Chloride  103    Calcium  9.6    Total Protein  6.9    Albumin  4.40    Total Bilirubin  0.4    Alkaline Phosphatase  69    AST (SGOT)  43 (A)    ALT (SGPT)  42 (A)    WBC 4.40     Hemoglobin 15.8     Hematocrit 48.4 (A)     Platelets 261     Total Cholesterol   209 (A)   Triglycerides   224 (A)   HDL Cholesterol   35 (A)   LDL Cholesterol    134 (A)   (A) Abnormal value       Comments are available for some flowsheets but are not being displayed.                     Assessment and Plan    Diagnoses and all orders for this visit:    1. Lightheadedness (Primary)  Assessment & Plan:  She notes intermittent episodes of feeling lightheaded along with loose, watery stools. She notes increased stress over the past several weeks with the recent death of her mother. She does have a hx of Meniere's disease, may take Meclizine as needed but consider further evaluation if sx persist and/or worsen.      2. Meniere's disease of right  ear  -     meclizine (ANTIVERT) 12.5 MG tablet; Take 1 tablet by mouth 3 (Three) Times a Day As Needed for Dizziness.  Dispense: 30 tablet; Refill: 0    3. Overweight (BMI 25.0-29.9)  Assessment & Plan:  She has lost 21# since joining [employer]; discussed goal weight of 164 pounds. Letter provided so she can become a Lifetime Member.        Follow Up   Return if symptoms worsen or fail to improve, for Next scheduled follow up.  Patient was given instructions and counseling regarding her condition or for health maintenance advice. Please see specific information pulled into the AVS if appropriate.       Answers for HPI/ROS submitted by the patient on 3/1/2022  Please describe your symptoms.: Slightly dizzy - feeling off balance  Have you had these symptoms before?: Yes  How long have you been having these symptoms?: 1-2 weeks  Please list any medications you are currently taking for this condition.: Meclizine  Please describe any probable cause for these symptoms. : Stress, anxiety or unknown  What is the primary reason for your visit?: Other

## 2022-03-05 PROBLEM — E66.3 OVERWEIGHT (BMI 25.0-29.9): Chronic | Status: ACTIVE | Noted: 2020-01-27

## 2022-03-05 PROBLEM — R42 LIGHTHEADEDNESS: Chronic | Status: ACTIVE | Noted: 2022-03-05

## 2022-03-05 PROBLEM — R42 LIGHTHEADEDNESS: Status: ACTIVE | Noted: 2022-03-05

## 2022-03-05 NOTE — ASSESSMENT & PLAN NOTE
She notes intermittent episodes of feeling lightheaded along with loose, watery stools. She notes increased stress over the past several weeks with the recent death of her mother. She does have a hx of Meniere's disease, may take Meclizine as needed but consider further evaluation if sx persist and/or worsen.

## 2022-03-05 NOTE — ASSESSMENT & PLAN NOTE
She has lost 21# since joining [employer]; discussed goal weight of 164 pounds. Letter provided so she can become a Lifetime Member.

## 2022-09-28 DIAGNOSIS — Z00.00 PHYSICAL EXAM: Primary | ICD-10-CM

## 2022-09-28 LAB
ALBUMIN SERPL-MCNC: 4.4 G/DL (ref 3.5–5.2)
ALBUMIN/GLOB SERPL: 1.9 G/DL
ALP SERPL-CCNC: 97 U/L (ref 39–117)
ALT SERPL-CCNC: 16 U/L (ref 1–33)
APPEARANCE UR: CLEAR
AST SERPL-CCNC: 22 U/L (ref 1–32)
BILIRUB SERPL-MCNC: 0.6 MG/DL (ref 0–1.2)
BILIRUB UR QL STRIP: NEGATIVE
BUN SERPL-MCNC: 13 MG/DL (ref 8–23)
BUN/CREAT SERPL: 12.6 (ref 7–25)
CALCIUM SERPL-MCNC: 9.4 MG/DL (ref 8.6–10.5)
CHLORIDE SERPL-SCNC: 105 MMOL/L (ref 98–107)
CHOLEST SERPL-MCNC: 184 MG/DL (ref 0–200)
CO2 SERPL-SCNC: 28.1 MMOL/L (ref 22–29)
COLOR UR: YELLOW
CREAT SERPL-MCNC: 1.03 MG/DL (ref 0.57–1)
EGFRCR SERPLBLD CKD-EPI 2021: 62.4 ML/MIN/1.73
ERYTHROCYTE [DISTWIDTH] IN BLOOD BY AUTOMATED COUNT: 12.2 % (ref 12.3–15.4)
GLOBULIN SER CALC-MCNC: 2.3 GM/DL
GLUCOSE SERPL-MCNC: 92 MG/DL (ref 65–99)
GLUCOSE UR QL STRIP: NEGATIVE
HCT VFR BLD AUTO: 44.1 % (ref 34–46.6)
HDLC SERPL-MCNC: 47 MG/DL (ref 40–60)
HGB BLD-MCNC: 15.3 G/DL (ref 12–15.9)
HGB UR QL STRIP: NEGATIVE
KETONES UR QL STRIP: NEGATIVE
LDLC SERPL CALC-MCNC: 122 MG/DL (ref 0–100)
LEUKOCYTE ESTERASE UR QL STRIP: NEGATIVE
MCH RBC QN AUTO: 30.5 PG (ref 26.6–33)
MCHC RBC AUTO-ENTMCNC: 34.7 G/DL (ref 31.5–35.7)
MCV RBC AUTO: 88 FL (ref 79–97)
NITRITE UR QL STRIP: NEGATIVE
PH UR STRIP: 6.5 [PH] (ref 5–8)
PLATELET # BLD AUTO: 228 10*3/MM3 (ref 140–450)
POTASSIUM SERPL-SCNC: 4.7 MMOL/L (ref 3.5–5.2)
PROT SERPL-MCNC: 6.7 G/DL (ref 6–8.5)
PROT UR QL STRIP: NEGATIVE
RBC # BLD AUTO: 5.01 10*6/MM3 (ref 3.77–5.28)
SODIUM SERPL-SCNC: 143 MMOL/L (ref 136–145)
SP GR UR STRIP: NORMAL (ref 1–1.03)
TRIGL SERPL-MCNC: 83 MG/DL (ref 0–150)
TSH SERPL DL<=0.005 MIU/L-ACNC: 2.94 UIU/ML (ref 0.27–4.2)
UROBILINOGEN UR STRIP-MCNC: NORMAL MG/DL
VLDLC SERPL CALC-MCNC: 15 MG/DL (ref 5–40)
WBC # BLD AUTO: 4.25 10*3/MM3 (ref 3.4–10.8)

## 2022-10-04 ENCOUNTER — OFFICE VISIT (OUTPATIENT)
Dept: INTERNAL MEDICINE | Facility: CLINIC | Age: 62
End: 2022-10-04

## 2022-10-04 VITALS
OXYGEN SATURATION: 100 % | BODY MASS INDEX: 26.89 KG/M2 | WEIGHT: 161.4 LBS | SYSTOLIC BLOOD PRESSURE: 120 MMHG | DIASTOLIC BLOOD PRESSURE: 70 MMHG | HEIGHT: 65 IN | TEMPERATURE: 97.5 F | HEART RATE: 76 BPM

## 2022-10-04 DIAGNOSIS — Z23 NEED FOR INFLUENZA VACCINATION: ICD-10-CM

## 2022-10-04 DIAGNOSIS — L71.9 ROSACEA: Chronic | ICD-10-CM

## 2022-10-04 DIAGNOSIS — Z00.00 PHYSICAL EXAM: Primary | ICD-10-CM

## 2022-10-04 DIAGNOSIS — I83.813 VARICOSE VEINS OF BOTH LOWER EXTREMITIES WITH PAIN: ICD-10-CM

## 2022-10-04 DIAGNOSIS — K21.9 GASTROESOPHAGEAL REFLUX DISEASE WITHOUT ESOPHAGITIS: Chronic | ICD-10-CM

## 2022-10-04 PROCEDURE — 99396 PREV VISIT EST AGE 40-64: CPT | Performed by: NURSE PRACTITIONER

## 2022-10-04 PROCEDURE — 90686 IIV4 VACC NO PRSV 0.5 ML IM: CPT | Performed by: NURSE PRACTITIONER

## 2022-10-04 PROCEDURE — 90471 IMMUNIZATION ADMIN: CPT | Performed by: NURSE PRACTITIONER

## 2022-10-04 RX ORDER — FINASTERIDE 5 MG/1
TABLET, FILM COATED ORAL
COMMUNITY
Start: 2022-09-16

## 2022-10-04 NOTE — PROGRESS NOTES
Subjective   Carlie Brennan is a 62 y.o. female who is here for a physical exam.    History of Present Illness  She has achieved and maintained her weight loss goal through weight watchers.  She is feeling well and exercises regularly.  She does note an improvement in her energy level.  She continues to work from home which she is enjoying and notes improvement in stress level.       The following portions of the patient's history were reviewed and updated as appropriate: allergies, current medications, past social history and problem list.    Past Medical History:   Diagnosis Date   • GERD (gastroesophageal reflux disease)    • Hypertension    • Iron deficiency anemia 2018   • Pregnancy        • Rosacea    • Vertigo          Current Outpatient Medications:   •  finasteride (PROSCAR) 5 MG tablet, , Disp: , Rfl:   •  meclizine (ANTIVERT) 12.5 MG tablet, Take 1 tablet by mouth 3 (Three) Times a Day As Needed for Dizziness., Disp: 30 tablet, Rfl: 0  •  metroNIDAZOLE (METROGEL) 1 % gel, Apply  topically to the appropriate area on face  as directed., Disp: 60 g, Rfl: PRN  •  omeprazole (PriLOSEC) 20 MG capsule, Take 20 mg by mouth daily as needed., Disp: , Rfl:     No Known Allergies    Review of Systems   Constitutional: Negative for activity change, appetite change, chills, diaphoresis, fatigue, fever and unexpected weight change.   HENT: Positive for congestion and postnasal drip. Negative for dental problem, drooling, ear discharge, ear pain, facial swelling, hearing loss, mouth sores, nosebleeds, rhinorrhea, sinus pressure, sore throat, tinnitus and trouble swallowing.    Eyes: Negative for photophobia, pain, discharge, redness, itching and visual disturbance.   Respiratory: Negative for apnea, cough, choking, chest tightness, shortness of breath and wheezing.    Cardiovascular: Negative for chest pain, palpitations and leg swelling.        No orthopnea, PND, COLON   Gastrointestinal: Negative for abdominal  "pain, blood in stool, constipation, diarrhea, nausea and vomiting.        Reflux has improved with recent weight loss, taking omeprazole intermittently as needed   Endocrine: Negative for cold intolerance, heat intolerance, polydipsia and polyuria.   Genitourinary: Negative for decreased urine volume, dysuria, enuresis, flank pain, frequency, hematuria and urgency.   Musculoskeletal: Negative for arthralgias, back pain, gait problem, joint swelling, myalgias, neck pain and neck stiffness.   Skin: Negative for color change and rash.        No hair changes, no nail changes   Allergic/Immunologic: Negative for environmental allergies, food allergies and immunocompromised state.   Neurological: Negative for dizziness, tremors, seizures, syncope, speech difficulty, weakness, light-headedness, numbness and headaches.   Hematological: Negative for adenopathy. Does not bruise/bleed easily.   Psychiatric/Behavioral: Negative for agitation, confusion, decreased concentration, dysphoric mood, sleep disturbance and suicidal ideas. The patient is not nervous/anxious.        Objective   Vitals:    10/04/22 0750   BP: 120/70   BP Location: Left arm   Patient Position: Sitting   Cuff Size: Adult   Pulse: 76   Temp: 97.5 °F (36.4 °C)   TempSrc: Temporal   SpO2: 100%   Weight: 73.2 kg (161 lb 6.4 oz)   Height: 165.1 cm (65\")     Physical Exam  Constitutional:       General: She is not in acute distress.     Appearance: Normal appearance. She is not diaphoretic.   HENT:      Head: Normocephalic and atraumatic.      Right Ear: Tympanic membrane, ear canal and external ear normal.      Left Ear: Tympanic membrane, ear canal and external ear normal.      Nose: Nose normal. No rhinorrhea.      Mouth/Throat:      Mouth: Mucous membranes are moist.      Pharynx: Oropharynx is clear.   Eyes:      General:         Right eye: No discharge.         Left eye: No discharge.      Conjunctiva/sclera: Conjunctivae normal.   Cardiovascular:      Rate " and Rhythm: Normal rate and regular rhythm.      Pulses: Normal pulses.      Heart sounds: Normal heart sounds.   Pulmonary:      Effort: Pulmonary effort is normal.      Breath sounds: Normal breath sounds.   Abdominal:      General: Bowel sounds are normal.      Tenderness: There is no abdominal tenderness.   Musculoskeletal:         General: No swelling or tenderness.      Cervical back: Normal range of motion.   Skin:     General: Skin is warm and dry.   Neurological:      General: No focal deficit present.      Mental Status: She is alert and oriented to person, place, and time.   Psychiatric:         Mood and Affect: Mood normal.         Behavior: Behavior normal.         Judgment: Judgment normal.         Assessment & Plan   Diagnoses and all orders for this visit:    1. Physical exam (Primary)    2. Gastroesophageal reflux disease without esophagitis  Assessment & Plan:  Reflux has improved with weight loss changes, taking Prilosec only as needed.      3. Varicose veins of both lower extremities with pain  Assessment & Plan:  She notes varicose veins of bilateral legs, will refer to vascular surgery for further treatment.    Orders:  -     Ambulatory Referral to Vascular Surgery    4. Rosacea  Assessment & Plan:  Managed with topical metronidazole cream.        Risk assessment:  She has a family hx (mother) of dementia.  Her Body mass index is 26.86 kg/m². - She exercises regularly (does strength training 3 days a week and the elliptical 2 days a week) and follows a low-fat, low-cholesterol diet.    Prevention:  Health Maintenance   Topic Date Due   • COVID-19 Vaccine (4 - Booster for Pfizer series) 02/25/2022   • INFLUENZA VACCINE  08/01/2022   • ANNUAL PHYSICAL  10/01/2022   • COLORECTAL CANCER SCREENING  03/12/2023   • DXA SCAN  08/31/2023   • LIPID PANEL  09/28/2023   • TDAP/TD VACCINES (2 - Td or Tdap) 03/29/2029   • HEPATITIS C SCREENING  Completed   • ZOSTER VACCINE  Completed   • Pneumococcal Vaccine  0-64  Aged Out   • MAMMOGRAM  Discontinued   • PAP SMEAR  Discontinued   She is followed by gynecology ([facility]) with regular Pap smears and screening mammograms.    Discussed healthy lifestyle choices such as maintaining a balanced diet low in carbohydrates and limiting caffeine and alcohol intake.  Recommended routine exercise for bone strength and cardiovascular health.

## 2022-10-06 ENCOUNTER — TELEPHONE (OUTPATIENT)
Dept: INTERNAL MEDICINE | Facility: CLINIC | Age: 62
End: 2022-10-06

## 2022-10-06 NOTE — TELEPHONE ENCOUNTER
I called to let Mrs. Brennan know that I had scheduled her appointment with [facility] for December 15th at 10:00am. They would like her to get there 15 minutes early and bring her new patient forms that will be mailed to her.

## 2022-10-12 ENCOUNTER — OFFICE VISIT (OUTPATIENT)
Dept: INTERNAL MEDICINE | Facility: CLINIC | Age: 62
End: 2022-10-12

## 2022-10-12 VITALS
WEIGHT: 162.2 LBS | HEART RATE: 81 BPM | OXYGEN SATURATION: 98 % | BODY MASS INDEX: 27.02 KG/M2 | DIASTOLIC BLOOD PRESSURE: 82 MMHG | HEIGHT: 65 IN | SYSTOLIC BLOOD PRESSURE: 146 MMHG | TEMPERATURE: 98.4 F

## 2022-10-12 DIAGNOSIS — H66.90 ACUTE OTITIS MEDIA, UNSPECIFIED OTITIS MEDIA TYPE: Primary | ICD-10-CM

## 2022-10-12 PROCEDURE — 99213 OFFICE O/P EST LOW 20 MIN: CPT

## 2022-10-12 RX ORDER — METHYLPREDNISOLONE 4 MG/1
TABLET ORAL
Qty: 21 TABLET | Refills: 0 | Status: SHIPPED | OUTPATIENT
Start: 2022-10-12 | End: 2022-12-30

## 2022-10-12 RX ORDER — AMOXICILLIN AND CLAVULANATE POTASSIUM 875; 125 MG/1; MG/1
1 TABLET, FILM COATED ORAL 2 TIMES DAILY
Qty: 10 TABLET | Refills: 0 | Status: SHIPPED | OUTPATIENT
Start: 2022-10-12 | End: 2022-10-17

## 2022-10-12 NOTE — PATIENT INSTRUCTIONS
Start Augmentin twice a day for 5 days. Start Medrol dose pack and take as directed. May continue naproxen as needed. Stay hydrated with water and rest.

## 2022-10-12 NOTE — PROGRESS NOTES
"Chief Complaint  Dizziness (so), Sore Throat, Headache, and Earache    Subjective        Carlie Brennan presents to [facility]  History of Present Illness  62 y.o. female presenting with concerns of ear pain, headache, sore throat and dizziness. Pt of Ashli Warren APRN. Sunday early morning, woke up in middle of night to assist 3 year old grandchild and felt dizzy. Sunday morning still felt dizzy, took a meclizine and rested the entire day. Monday, felt dizzy and \"didn't feel good\". Tuesday, sinus and ear pressure, headache and sore throat. Took naproxen for pain. Denies fever, cough, wheezes, changes in taste or smell.        Objective   Vital Signs:  /82 (BP Location: Left arm, Patient Position: Sitting, Cuff Size: Large Adult)   Pulse 81   Temp 98.4 °F (36.9 °C) (Infrared)   Ht 165.1 cm (65\")   Wt 73.6 kg (162 lb 3.2 oz)   SpO2 98%   BMI 26.99 kg/m²   Estimated body mass index is 26.99 kg/m² as calculated from the following:    Height as of this encounter: 165.1 cm (65\").    Weight as of this encounter: 73.6 kg (162 lb 3.2 oz).          Physical Exam  Vitals reviewed.   Constitutional:       Appearance: Normal appearance.   HENT:      Head: Normocephalic.      Jaw: No tenderness.      Right Ear: No tenderness. Tympanic membrane is bulging. Tympanic membrane is not erythematous.      Left Ear: No tenderness. Tympanic membrane is bulging. Tympanic membrane is not erythematous.      Nose: Nose normal.      Mouth/Throat:      Pharynx: No oropharyngeal exudate or posterior oropharyngeal erythema.      Tonsils: No tonsillar exudate or tonsillar abscesses.      Comments: Tonsils enlarged  Eyes:      Pupils: Pupils are equal, round, and reactive to light.   Neurological:      Mental Status: She is alert.        Result Review :    Common labs    Common Labs 9/28/22 9/28/22 9/28/22    0820 0820 0820   Glucose  92    BUN  13    Creatinine  1.03 (A)    Sodium  143    Potassium "  4.7    Chloride  105    Calcium  9.4    Total Protein  6.7    Albumin  4.40    Total Bilirubin  0.6    Alkaline Phosphatase  97    AST (SGOT)  22    ALT (SGPT)  16    WBC 4.25     Hemoglobin 15.3     Hematocrit 44.1     Platelets 228     Total Cholesterol   184   Triglycerides   83   HDL Cholesterol   47   LDL Cholesterol    122 (A)   (A) Abnormal value       Comments are available for some flowsheets but are not being displayed.           Current Outpatient Medications on File Prior to Visit   Medication Sig Dispense Refill   • finasteride (PROSCAR) 5 MG tablet      • meclizine (ANTIVERT) 12.5 MG tablet Take 1 tablet by mouth 3 (Three) Times a Day As Needed for Dizziness. 30 tablet 0   • metroNIDAZOLE (METROGEL) 1 % gel Apply  topically to the appropriate area on face  as directed. 60 g PRN   • omeprazole (PriLOSEC) 20 MG capsule Take 20 mg by mouth daily as needed.       No current facility-administered medications on file prior to visit.                 Assessment and Plan   Diagnoses and all orders for this visit:    1. Acute otitis media, unspecified otitis media type (Primary)  -     amoxicillin-clavulanate (Augmentin) 875-125 MG per tablet; Take 1 tablet by mouth 2 (Two) Times a Day for 5 days.  Dispense: 10 tablet; Refill: 0  -     methylPREDNISolone (MEDROL) 4 MG dose pack; Take as directed on package instructions.  Dispense: 21 tablet; Refill: 0      Start Augmentin twice a day for 5 days. Start Medrol dose pack and take as directed. May continue naproxen as needed. Stay hydrated with water and rest.        Follow Up   No follow-ups on file.  Patient was given instructions and counseling regarding her condition or for health maintenance advice. Please see specific information pulled into the AVS if appropriate.

## 2022-11-14 NOTE — THERAPY TREATMENT NOTE
Outpatient Physical Therapy Ortho Treatment Note  [facility]     Patient Name: Carlie Brennan  : 1960  MRN: [id number]  Today's Date: 2019      Visit Date: 2019    Visit Dx:    ICD-10-CM ICD-9-CM   1. Left shoulder pain, unspecified chronicity M25.512 719.41       Patient Active Problem List   Diagnosis   • Essential hypertension   • Gastroesophageal reflux disease without esophagitis   • Mitral valve prolapse   • Rosacea   • Hyperlipidemia   • Health care maintenance   • History of adenomatous polyp of colon   • Meniere's disease of right ear        Past Medical History:   Diagnosis Date   • GERD (gastroesophageal reflux disease)    • Hypertension    • Iron deficiency anemia 2018   • Pregnancy        • Rosacea    • Vertigo         Past Surgical History:   Procedure Laterality Date   • ABDOMINOPLASTY     •  SECTION      x 2   • COLONOSCOPY N/A 3/12/2018    Procedure: COLONOSCOPY INTO CECUM AND NORMAL TI WITH HOT SNARE POLYPECTOMY;  Surgeon: Lincoln Cihno MD;  Location: [facility] ENDOSCOPY;  Service: Gastroenterology   • LASIK                         PT Assessment/Plan     Row Name 19 1526          PT Assessment    Assessment Comments  Pt with slight reduction in pain since injection on Friday, however continues with ROM limitations into abduction, ER and IR. Progressed ROM exercises this date with slight increase in symptoms at end range. Pt returns to MD in 1 month for follow up and possible manipulation consult, if needed.   -CN        PT Plan    PT Plan Comments  Continue with ROM and progressive strengthening as tolerated.   -CN       User Key  (r) = Recorded By, (t) = Taken By, (c) = Cosigned By    Initials Name Provider Type    Sumaya Irizarry, PT Physical Therapist            Exercises     Row Name 19 1500             Subjective Comments    Subjective Comments  I had an injection last week which was really painful. She gave me some  exercises and wants to see me back in a few weeks to maybe do a manipulation if I need it.   -CN         Subjective Pain    Able to rate subjective pain?  yes  -CN      Pre-Treatment Pain Level  1  -CN         Total Minutes    11513 - PT Therapeutic Exercise Minutes  25  -CN      51687 - PT Manual Therapy Minutes  15  -CN         Exercise 1    Exercise Name 1  AAROM flexion with bar  -CN      Cueing 1  Demo  -CN      Reps 1  10  -CN      Time 1  5 sec  -CN      Additional Comments  2#  -CN         Exercise 5    Exercise Name 5  pulleys flex  -CN      Reps 5  10  -CN      Time 5  5 seconds  -CN         Exercise 6    Exercise Name 6  PROM with cane into ER in supine  -CN      Cueing 6  Demo;Verbal  -CN      Reps 6  10  -CN      Time 6  5 sec  -CN         Exercise 11    Exercise Name 11  wallslides flex, abd and rainbow standing  -CN      Reps 11  10  -CN      Time 11  5 seconds  -CN         Exercise 12    Exercise Name 12  UBE  -CN      Time 12  4 min  -CN         Exercise 15    Exercise Name 15  beach chair stretch  -CN      Cueing 15  Demo  -CN      Reps 15  10  -CN      Time 15  5 sec  -CN         Exercise 18    Exercise Name 18  IR towel stretch  -CN      Cueing 18  Demo  -CN      Reps 18  10  -CN      Time 18  5 sec  -CN         Exercise 19    Exercise Name 19  Shoulder extension with cane  -CN      Cueing 19  Demo  -CN      Reps 19  10   -CN      Time 19  5 sec  -CN        User Key  (r) = Recorded By, (t) = Taken By, (c) = Cosigned By    Initials Name Provider Type    Sumaya Irizarry, PT Physical Therapist                      Manual Rx (last 36 hours)      Manual Treatments     Row Name 04/22/19 1500             Total Minutes    00296 - PT Manual Therapy Minutes  15  -CN         Manual Rx 1    Manual Rx 1 Location  left shoulder  -CN      Manual Rx 1 Type  PROM all planes, gentle post and inf joint mobs  -CN        User Key  (r) = Recorded By, (t) = Taken By, (c) = Cosigned By    Initials Name  Provider Type    Sumaya Irizarry, IMANI Physical Therapist          PT OP Goals     Row Name 04/22/19 1500          PT Short Term Goals    STG Date to Achieve  03/21/19  -CN     STG 1  Pt will be independent and verbalized good understanding of initial HEP to improve ability to manage pain, as well as improve strength and ROM  -CN     STG 1 Progress  Met  -CN     STG 2  Pt will improve L shoulder flexion to at least 145 deg to improve ability to reach overhead.   -CN     STG 2 Progress  Progressing  -CN     STG 2 Progress Comments  Progressed ROM exercises this date.   -CN        Long Term Goals    LTG Date to Achieve  04/18/19  -CN     LTG 1  Pt will be independent and verbalized good understanding of advanced HEP to improve ability to manage pain, as well as improve strength and ROM.  -CN     LTG 1 Progress  Progressing  -CN     LTG 2  Pt will improve L shoulder flexion to at least 160 deg to improve ability to reach overhead.   -CN     LTG 2 Progress  Ongoing  -CN     LTG 3  Pt will report </=3/10 pain in L shoulder with overhead activities to improve participation in ADLs.  -CN     LTG 3 Progress  Progressing  -CN     LTG 4  Pt will improve DASH score to at least </=20% perceived disability to show overall improved quality of life.  -CN     LTG 4 Progress  Progressing  -CN       User Key  (r) = Recorded By, (t) = Taken By, (c) = Cosigned By    Initials Name Provider Type    Sumaya Irizarry, IMANI Physical Therapist          Therapy Education  Given: HEP, Symptoms/condition management, Pain management, Mobility training, Other (comment)  Program: Progressed  How Provided: Verbal, Demonstration  Provided to: Patient  Level of Understanding: Teach back education performed, Demonstrated, Verbalized              Time Calculation:   Start Time: 1449  Stop Time: 1529  Time Calculation (min): 40 min  Therapy Charges for Today     Code Description Service Date Service Provider Modifiers Qty    99348634716  HC PT THER PROC EA 15 MIN 4/22/2019 Sumaya Hernandez, PT GP 2    [id number] HC PT MANUAL THERAPY EA 15 MIN 4/22/2019 Sumaya Hernandez, PT GP 1                    Sumaya Hernandez, PT  4/22/2019      97

## 2022-12-12 ENCOUNTER — OFFICE VISIT (OUTPATIENT)
Dept: INTERNAL MEDICINE | Facility: CLINIC | Age: 62
End: 2022-12-12

## 2022-12-12 VITALS
HEART RATE: 83 BPM | WEIGHT: 158.4 LBS | BODY MASS INDEX: 26.39 KG/M2 | DIASTOLIC BLOOD PRESSURE: 86 MMHG | SYSTOLIC BLOOD PRESSURE: 140 MMHG | TEMPERATURE: 97.8 F | HEIGHT: 65 IN | OXYGEN SATURATION: 98 %

## 2022-12-12 DIAGNOSIS — H93.12 TINNITUS, LEFT EAR: Primary | ICD-10-CM

## 2022-12-12 PROCEDURE — 99213 OFFICE O/P EST LOW 20 MIN: CPT | Performed by: NURSE PRACTITIONER

## 2022-12-12 RX ORDER — TRIAMTERENE AND HYDROCHLOROTHIAZIDE 37.5; 25 MG/1; MG/1
TABLET ORAL
COMMUNITY
Start: 2022-12-06

## 2022-12-12 NOTE — PROGRESS NOTES
"Chief Complaint  Tinnitus  Subjective        Carlie Brennan presents to [facility] due to tinnitus.  History of Present Illness      She notes a longstanding hx of tinnitus    The patient reports experiencing ringing in her ears for years. However, over the past several weeks she notes increased tinnitus in the left ear without hearing changes. It started off as being muffled but now is continuous. She always wears her headphones on her left side and thought it could be why so she changed it to the other ear. The right ear does not bother her.   She is not sleeping as well because the ringing is loud and she is not able to go back to sleep for a while.     Objective   Vital Signs:  /86 (BP Location: Left arm, Patient Position: Sitting, Cuff Size: Adult)   Pulse 83   Temp 97.8 °F (36.6 °C) (Temporal)   Ht 165.1 cm (65\")   Wt 71.8 kg (158 lb 6.4 oz)   SpO2 98%   BMI 26.36 kg/m²   Estimated body mass index is 26.36 kg/m² as calculated from the following:    Height as of this encounter: 165.1 cm (65\").    Weight as of this encounter: 71.8 kg (158 lb 6.4 oz).    BMI is >= 25 and <30. (Overweight) The following options were offered after discussion;: nutrition counseling/recommendations      Physical Exam  Constitutional:       General: She is not in acute distress.     Appearance: Normal appearance. She is not diaphoretic.   HENT:      Head: Normocephalic and atraumatic.      Right Ear: Tympanic membrane, ear canal and external ear normal.      Left Ear: Tympanic membrane, ear canal and external ear normal.      Nose: Nose normal. No rhinorrhea.      Mouth/Throat:      Mouth: Mucous membranes are moist.      Pharynx: Oropharynx is clear.   Eyes:      General:         Right eye: No discharge.         Left eye: No discharge.      Conjunctiva/sclera: Conjunctivae normal.   Cardiovascular:      Rate and Rhythm: Normal rate and regular rhythm.      Pulses: Normal pulses.      Heart " sounds: Normal heart sounds.   Pulmonary:      Effort: Pulmonary effort is normal.      Breath sounds: Normal breath sounds.   Abdominal:      General: Bowel sounds are normal.      Tenderness: There is no abdominal tenderness.   Musculoskeletal:         General: No swelling or tenderness.      Cervical back: Normal range of motion.   Skin:     General: Skin is warm and dry.   Neurological:      General: No focal deficit present.      Mental Status: She is alert and oriented to person, place, and time.   Psychiatric:         Mood and Affect: Mood normal.         Behavior: Behavior normal.         Judgment: Judgment normal.        Result Review :  The following data was reviewed by: RAY Mobley on 12/12/2022:  Common labs    Common Labs 9/28/22 9/28/22 9/28/22    0820 0820 0820   Glucose  92    BUN  13    Creatinine  1.03 (A)    Sodium  143    Potassium  4.7    Chloride  105    Calcium  9.4    Total Protein  6.7    Albumin  4.40    Total Bilirubin  0.6    Alkaline Phosphatase  97    AST (SGOT)  22    ALT (SGPT)  16    WBC 4.25     Hemoglobin 15.3     Hematocrit 44.1     Platelets 228     Total Cholesterol   184   Triglycerides   83   HDL Cholesterol   47   LDL Cholesterol    122 (A)   (A) Abnormal value       Comments are available for some flowsheets but are not being displayed.                       Assessment and Plan   Diagnoses and all orders for this visit:    1. Tinnitus, left ear (Primary)  -     Ambulatory Referral to ENT (Otolaryngology)    She has a hx of bilateral tinnitus but sx have significantly increased in her left ear over the past several weeks. Denies hearing loss or changes. Due to unilateral change in symptoms will refer to ENT for further evaluation.       Follow Up   Return if symptoms worsen or fail to improve, for Next scheduled follow up.  Patient was given instructions and counseling regarding her condition or for health maintenance advice. Please see specific information pulled  into the AVS if appropriate.     Transcribed from ambient dictation for RAY Mobley by Shavonne Mathew.  12/12/22   16:47 EST    Patient or patient representative verbalized consent to the visit recording.  I have personally performed the services described in this document as transcribed by the above individual, and it is both accurate and complete.

## 2022-12-27 ENCOUNTER — DOCUMENTATION (OUTPATIENT)
Dept: GASTROENTEROLOGY | Facility: CLINIC | Age: 62
End: 2022-12-27

## 2022-12-30 ENCOUNTER — TELEMEDICINE (OUTPATIENT)
Dept: INTERNAL MEDICINE | Facility: CLINIC | Age: 62
End: 2022-12-30

## 2022-12-30 DIAGNOSIS — J01.90 ACUTE NON-RECURRENT SINUSITIS, UNSPECIFIED LOCATION: Primary | ICD-10-CM

## 2022-12-30 PROCEDURE — 99213 OFFICE O/P EST LOW 20 MIN: CPT | Performed by: NURSE PRACTITIONER

## 2022-12-30 RX ORDER — GUAIFENESIN 600 MG/1
600 TABLET, EXTENDED RELEASE ORAL EVERY 12 HOURS SCHEDULED
Qty: 14 TABLET
Start: 2022-12-30 | End: 2023-01-06

## 2022-12-30 RX ORDER — CEFDINIR 300 MG/1
300 CAPSULE ORAL 2 TIMES DAILY
Qty: 14 CAPSULE | Refills: 0 | Status: SHIPPED | OUTPATIENT
Start: 2022-12-30 | End: 2023-01-06

## 2022-12-31 NOTE — PROGRESS NOTES
"Chief Complaint  URI    Subjective        Carlie Brennan presents to [facility] due to respiratory symptoms.    Location of patient: home  Location of provider: [facility] [place] [facility]  The visit included audio and video interaction between patient and provider due to the COVID-19 pandemic.    You have chosen to receive care through a telehealth visit.  Do you consent to use a video/audio connection for your medical care today? Yes    History of Present Illness  She presents due to increased sinus pressure and drainage which began several days ago, denies fever and/or chills. She c/o facial pain with headaches, denies chest congestion and/or cough. Denies recent COVID-19 exposure.  She was treated 10/2022 for similar sx which have since resolved.    Objective   Vital Signs:  There were no vitals taken for this visit.  Estimated body mass index is 26.36 kg/m² as calculated from the following:    Height as of 12/12/22: 165.1 cm (65\").    Weight as of 12/12/22: 71.8 kg (158 lb 6.4 oz).    BMI is >= 25 and <30. (Overweight) The following options were offered after discussion;: nutrition counseling/recommendations      Physical Exam  Constitutional:       Appearance: She is well-developed.   HENT:      Head: Normocephalic and atraumatic.   Eyes:      General:         Right eye: No discharge.         Left eye: No discharge.      Conjunctiva/sclera: Conjunctivae normal.   Pulmonary:      Effort: Pulmonary effort is normal.   Neurological:      Mental Status: She is alert and oriented to person, place, and time.   Psychiatric:         Behavior: Behavior normal.         Thought Content: Thought content normal.         Judgment: Judgment normal.        Result Review :  The following data was reviewed by: RAY Mobley on 12/30/2022:  Common labs    Common Labs 9/28/22 9/28/22 9/28/22    0820 0820 0820   Glucose  92    BUN  13    Creatinine  1.03 (A)    Sodium  143    Potassium  4.7    Chloride  " 105    Calcium  9.4    Total Protein  6.7    Albumin  4.40    Total Bilirubin  0.6    Alkaline Phosphatase  97    AST (SGOT)  22    ALT (SGPT)  16    WBC 4.25     Hemoglobin 15.3     Hematocrit 44.1     Platelets 228     Total Cholesterol   184   Triglycerides   83   HDL Cholesterol   47   LDL Cholesterol    122 (A)   (A) Abnormal value       Comments are available for some flowsheets but are not being displayed.                     Assessment and Plan   Diagnoses and all orders for this visit:    1. Acute non-recurrent sinusitis, unspecified location (Primary)    Other orders  -     cefdinir (OMNICEF) 300 MG capsule; Take 1 capsule by mouth 2 (Two) Times a Day for 7 days.  Dispense: 14 capsule; Refill: 0  -     guaiFENesin (Mucinex) 600 MG 12 hr tablet; Take 1 tablet by mouth Every 12 (Twelve) Hours for 7 days.  Dispense: 14 tablet    She will start Mucinex and Flonase for increased congestion and sinus pressure, only start antibiotic if sx worsen over weekend (concerned about worsening sx over Holiday weekend). RTC if sx persist and/or worsen.    History and medical judgement obtained from video conversation with patient. No hands-on physical examination was performed. Approximately 13 minutes spent in video conversation with patient and in chart review.       Follow Up   Return if symptoms worsen or fail to improve, for Next scheduled follow up.  Patient was given instructions and counseling regarding her condition or for health maintenance advice. Please see specific information pulled into the AVS if appropriate.

## 2023-02-06 ENCOUNTER — TELEMEDICINE (OUTPATIENT)
Dept: INTERNAL MEDICINE | Facility: CLINIC | Age: 63
End: 2023-02-06
Payer: COMMERCIAL

## 2023-02-06 DIAGNOSIS — F41.9 ANXIETY: Primary | ICD-10-CM

## 2023-02-06 PROCEDURE — 99213 OFFICE O/P EST LOW 20 MIN: CPT | Performed by: NURSE PRACTITIONER

## 2023-02-06 RX ORDER — ESCITALOPRAM OXALATE 10 MG/1
10 TABLET ORAL DAILY
Qty: 30 TABLET | Refills: 1 | Status: SHIPPED | OUTPATIENT
Start: 2023-02-06

## 2023-02-13 NOTE — PROGRESS NOTES
"Chief Complaint  Anxiety    Subjective        Carlie Brennan presents to [facility] due to increased anxiety.    You have chosen to receive care through a telehealth visit.  Do you consent to use a video/audio connection for your medical care today? Yes    Location of patient: home  [place] of [place] [facility] [place] [facility]  The visit included audio and video interaction between patient and provider due to the COVID-19 pandemic.    History of Present Illness  She presents due to increased anxiety since starting a new job a couple of weeks ago. 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 She reports nervousness and is concerned she is unable to concentrate effectively due to symptoms, denies panic attacks.     Objective   Vital Signs:  There were no vitals taken for this visit.  Estimated body mass index is 26.36 kg/m² as calculated from the following:    Height as of 12/12/22: 165.1 cm (65\").    Weight as of 12/12/22: 71.8 kg (158 lb 6.4 oz).       BMI is within normal parameters. No other follow-up for BMI required.      Physical Exam  Constitutional:       Appearance: She is well-developed.   HENT:      Head: Normocephalic and atraumatic.   Eyes:      General:         Right eye: No discharge.         Left eye: No discharge.      Conjunctiva/sclera: Conjunctivae normal.   Pulmonary:      Effort: Pulmonary effort is normal.   Neurological:      Mental Status: She is alert and oriented to person, place, and time.   Psychiatric:         Behavior: Behavior normal.         Thought Content: Thought content normal.         Judgment: Judgment normal.        Result Review :  The following data was reviewed by: RAY Mobley on 02/06/2023:  Common labs    Common Labs 9/28/22 9/28/22 9/28/22    0820 0820 0820   Glucose  92    BUN  13    Creatinine  1.03 (A)    Sodium  143    Potassium  4.7    Chloride  105    Calcium  9.4    Total Protein  6.7    Albumin  4.40  "   Total Bilirubin  0.6    Alkaline Phosphatase  97    AST (SGOT)  22    ALT (SGPT)  16    WBC 4.25     Hemoglobin 15.3     Hematocrit 44.1     Platelets 228     Total Cholesterol   184   Triglycerides   83   HDL Cholesterol   47   LDL Cholesterol    122 (A)   (A) Abnormal value       Comments are available for some flowsheets but are not being displayed.                        Assessment and Plan   Diagnoses and all orders for this visit:    1. Anxiety (Primary)  -     escitalopram (Lexapro) 10 MG tablet; Take 1 tablet by mouth Daily.  Dispense: 30 tablet; Refill: 1    She will begin Lexapro for increased anxiety and nervousness, will re-evaluate in 8 weeks to monitor efficacy. Discussed benefits as well as side effects of medication.    History and medical judgement obtained from video conversation with patient. No hands-on physical examination was performed. Approximately 14 minutes spent in video conversation with patient and in chart review.         Follow Up   Return if symptoms worsen or fail to improve, for Next scheduled follow up.  Patient was given instructions and counseling regarding her condition or for health maintenance advice. Please see specific information pulled into the AVS if appropriate.

## 2023-07-26 ENCOUNTER — TELEPHONE (OUTPATIENT)
Dept: GASTROENTEROLOGY | Facility: CLINIC | Age: 63
End: 2023-07-26
Payer: COMMERCIAL

## 2023-07-26 NOTE — TELEPHONE ENCOUNTER
Last scope 03/12/2018 in epic-- personal hx of polyps-- no family hx of polyps or colon ca-- no ASA or blood thinners-- medications:    finasteride (PROSCAR) 5 MG tablet   triamterene-hydrochlorothiazide (MAXZIDE-25) 37.5-25 MG per tablet   Biotin   Juan Red     OA form scanned into media

## 2023-08-06 ENCOUNTER — PREP FOR SURGERY (OUTPATIENT)
Dept: OTHER | Facility: HOSPITAL | Age: 63
End: 2023-08-06
Payer: COMMERCIAL

## 2023-08-06 DIAGNOSIS — Z86.010 HISTORY OF ADENOMATOUS POLYP OF COLON: Primary | ICD-10-CM

## 2023-08-11 ENCOUNTER — TELEPHONE (OUTPATIENT)
Dept: GASTROENTEROLOGY | Facility: CLINIC | Age: 63
End: 2023-08-11
Payer: COMMERCIAL

## 2023-08-14 ENCOUNTER — TELEPHONE (OUTPATIENT)
Dept: GASTROENTEROLOGY | Facility: CLINIC | Age: 63
End: 2023-08-14

## 2023-08-14 ENCOUNTER — TELEPHONE (OUTPATIENT)
Dept: GASTROENTEROLOGY | Facility: CLINIC | Age: 63
End: 2023-08-14
Payer: COMMERCIAL

## 2023-08-14 NOTE — TELEPHONE ENCOUNTER
Hub staff attempted to follow warm transfer process and was unsuccessful     Caller: Carlie Brennan    Relationship to patient: Self    Best call back number: [phone]    Patient is needing: PT IS RETURNING MISSED CALL TO SCHEDULE SCOPE. PT IS AVAILABLE ANYTIME

## 2023-08-15 ENCOUNTER — TELEPHONE (OUTPATIENT)
Dept: GASTROENTEROLOGY | Facility: CLINIC | Age: 63
End: 2023-08-15
Payer: COMMERCIAL

## 2023-08-15 NOTE — TELEPHONE ENCOUNTER
: Carlie Brennan     Relationship to patient: Self     Best call back number: [phone]      Patient is needing: RETURNING CALL TO SCHEDULE SCOPE.

## 2023-08-15 NOTE — TELEPHONE ENCOUNTER
Hub staff attempted to follow warm transfer process and was unsuccessful     Caller: Carlie Brennan    Relationship to patient: Self    Best call back number: [phone]     Patient is needing: RETURNING CALL TO SCHEDULE SCOPE.

## 2023-09-07 ENCOUNTER — TELEPHONE (OUTPATIENT)
Dept: GASTROENTEROLOGY | Facility: CLINIC | Age: 63
End: 2023-09-07
Payer: COMMERCIAL

## 2023-10-05 DIAGNOSIS — Z00.00 PE (PHYSICAL EXAM), ANNUAL: Primary | ICD-10-CM

## 2023-10-06 LAB
ALBUMIN SERPL-MCNC: 4.5 G/DL (ref 3.5–5.2)
ALBUMIN/GLOB SERPL: 1.7 G/DL
ALP SERPL-CCNC: 90 U/L (ref 39–117)
ALT SERPL-CCNC: 19 U/L (ref 1–33)
APPEARANCE UR: CLEAR
AST SERPL-CCNC: 25 U/L (ref 1–32)
BILIRUB SERPL-MCNC: 0.4 MG/DL (ref 0–1.2)
BILIRUB UR QL STRIP: NEGATIVE
BUN SERPL-MCNC: 16 MG/DL (ref 8–23)
BUN/CREAT SERPL: 18.4 (ref 7–25)
CALCIUM SERPL-MCNC: 9.8 MG/DL (ref 8.6–10.5)
CHLORIDE SERPL-SCNC: 104 MMOL/L (ref 98–107)
CHOLEST SERPL-MCNC: 208 MG/DL (ref 0–200)
CO2 SERPL-SCNC: 27.6 MMOL/L (ref 22–29)
COLOR UR: YELLOW
CREAT SERPL-MCNC: 0.87 MG/DL (ref 0.57–1)
EGFRCR SERPLBLD CKD-EPI 2021: 75.9 ML/MIN/1.73
ERYTHROCYTE [DISTWIDTH] IN BLOOD BY AUTOMATED COUNT: 12.3 % (ref 12.3–15.4)
GLOBULIN SER CALC-MCNC: 2.6 GM/DL
GLUCOSE SERPL-MCNC: 91 MG/DL (ref 65–99)
GLUCOSE UR QL STRIP: NEGATIVE
HCT VFR BLD AUTO: 46.3 % (ref 34–46.6)
HDLC SERPL-MCNC: 49 MG/DL (ref 40–60)
HGB BLD-MCNC: 15.6 G/DL (ref 12–15.9)
HGB UR QL STRIP: NEGATIVE
KETONES UR QL STRIP: NEGATIVE
LDLC SERPL CALC-MCNC: 139 MG/DL (ref 0–100)
LEUKOCYTE ESTERASE UR QL STRIP: NEGATIVE
MCH RBC QN AUTO: 30.2 PG (ref 26.6–33)
MCHC RBC AUTO-ENTMCNC: 33.7 G/DL (ref 31.5–35.7)
MCV RBC AUTO: 89.7 FL (ref 79–97)
NITRITE UR QL STRIP: NEGATIVE
PH UR STRIP: 6.5 [PH] (ref 5–8)
PLATELET # BLD AUTO: 247 10*3/MM3 (ref 140–450)
POTASSIUM SERPL-SCNC: 4.2 MMOL/L (ref 3.5–5.2)
PROT SERPL-MCNC: 7.1 G/DL (ref 6–8.5)
PROT UR QL STRIP: NEGATIVE
RBC # BLD AUTO: 5.16 10*6/MM3 (ref 3.77–5.28)
SODIUM SERPL-SCNC: 142 MMOL/L (ref 136–145)
SP GR UR STRIP: 1.01 (ref 1–1.03)
TRIGL SERPL-MCNC: 111 MG/DL (ref 0–150)
TSH SERPL DL<=0.005 MIU/L-ACNC: 3.39 UIU/ML (ref 0.27–4.2)
UROBILINOGEN UR STRIP-MCNC: NORMAL MG/DL
VLDLC SERPL CALC-MCNC: 20 MG/DL (ref 5–40)
WBC # BLD AUTO: 4.67 10*3/MM3 (ref 3.4–10.8)

## 2023-10-13 ENCOUNTER — OFFICE VISIT (OUTPATIENT)
Dept: INTERNAL MEDICINE | Facility: CLINIC | Age: 63
End: 2023-10-13
Payer: COMMERCIAL

## 2023-10-13 VITALS
BODY MASS INDEX: 28.46 KG/M2 | OXYGEN SATURATION: 99 % | DIASTOLIC BLOOD PRESSURE: 70 MMHG | SYSTOLIC BLOOD PRESSURE: 132 MMHG | HEART RATE: 78 BPM | HEIGHT: 65 IN | WEIGHT: 170.8 LBS

## 2023-10-13 DIAGNOSIS — H81.01 MENIERE'S DISEASE OF RIGHT EAR: ICD-10-CM

## 2023-10-13 DIAGNOSIS — Z23 NEED FOR INFLUENZA VACCINATION: ICD-10-CM

## 2023-10-13 DIAGNOSIS — Z00.00 PE (PHYSICAL EXAM), ANNUAL: Primary | ICD-10-CM

## 2023-10-13 DIAGNOSIS — Z78.0 POSTMENOPAUSAL: ICD-10-CM

## 2023-10-13 RX ORDER — IMIQUIMOD 12.5 MG/.25G
CREAM TOPICAL
COMMUNITY
Start: 2023-08-22

## 2023-10-13 NOTE — PROGRESS NOTES
Subjective   Carlie Brennan is a 63 y.o. female who is here for a physical exam.    History of Present Illness     Fantasma Mora presents today for physical exam.    The patient reports she is not sleeping well. Patient has difficulties staying asleep. She has taken melatonin in the past that was not effective. She has taken Tylenol PM and NyQuil occasionally. She shares that her current job is stress free compared to her previous job.     She reports intermittent dizziness episodes. She continues meclizine as needed. Blood pressure today is 132/70 mmHg. She was seen in 07/2023 and found to have elevated blood pressure at 146/100 mmHg. She was prescribed a Medrol Dosepak at that time. She is currently taking triamterene- hydrochlorothiazide as needed. Patient does have a history of Meniere's disease of the right ear.    Patient continues finasteride. She takes Aldara cream for precancerous lesion on her face by dermatology. She reports erythema after use. She is followed by Dr. Renetta Hancock.     She has a history of increased intraocular pressure that is being monitored by [facility].      Patient has a history of varicose veins of both lower extremities. She is followed by Dr. Escoto. She continues to wear compression stockings. Her job is sedentary and reports swelling at the end of the week.     GERD is managed on Prilosec as needed.      She reports intermittent white spots on her throat.     A colonoscopy has not been scheduled at this time. RSV, influenza, and COVID-19 vaccines and DEXA scan are due. Patient reports weight gain. She exercises every morning. Urinalysis was clear. Thyroid function was 3.3 uIU/mL. Total Cholesterol was 208 mg/dL. LDL was 139 mg/dL. Glucose, kidney function, sodium, potassium, liver function, CBC, hemoglobin, and hematocrit were normal.     Past Medical History:   Diagnosis Date    GERD (gastroesophageal reflux disease)     Hypertension     Iron deficiency  anemia 2018    Pregnancy         Rosacea     Vertigo          Current Outpatient Medications:     finasteride (PROSCAR) 5 MG tablet, , Disp: , Rfl:     imiquimod (ALDARA) 5 % cream, APPLY A THIN LAYER TO THE AFFECTED AREA ON THE CHEEKS AND UPPER LIP ONCE A WEEK FOR 6 MONTHS, Disp: , Rfl:     meclizine (ANTIVERT) 12.5 MG tablet, Take 1 tablet by mouth 3 (Three) Times a Day As Needed for Dizziness., Disp: 30 tablet, Rfl: 0    metroNIDAZOLE (METROGEL) 1 % gel, Apply  topically to the appropriate area on face  as directed., Disp: 60 g, Rfl: PRN    omeprazole (PriLOSEC) 20 MG capsule, Take 1 capsule by mouth Daily As Needed., Disp: , Rfl:     triamterene-hydrochlorothiazide (MAXZIDE-25) 37.5-25 MG per tablet, TAKE ONE TABLET BY MOUTH DAILY (Patient not taking: Reported on 10/13/2023), Disp: 30 tablet, Rfl: 3    No Known Allergies    Review of Systems   Constitutional:  Negative for activity change, appetite change, chills, diaphoresis, fatigue, fever and unexpected weight change.   HENT:  Positive for congestion and postnasal drip. Negative for dental problem, drooling, ear discharge, ear pain, facial swelling, hearing loss, mouth sores, nosebleeds, rhinorrhea, sinus pressure, sore throat, tinnitus and trouble swallowing.    Eyes:  Negative for photophobia, pain, discharge, redness, itching and visual disturbance.   Respiratory:  Negative for apnea, cough, choking, chest tightness, shortness of breath and wheezing.    Cardiovascular:  Negative for chest pain, palpitations and leg swelling.        No orthopnea, PND, COLON   Gastrointestinal:  Negative for abdominal pain, blood in stool, constipation, diarrhea, nausea and vomiting.   Endocrine: Negative for cold intolerance, heat intolerance, polydipsia and polyuria.   Genitourinary:  Negative for decreased urine volume, dysuria, enuresis, flank pain, frequency, hematuria and urgency.   Musculoskeletal:  Negative for arthralgias, back pain, gait problem, joint  "swelling, myalgias, neck pain and neck stiffness.   Skin:  Negative for color change and rash.        No hair changes, no nail changes   Allergic/Immunologic: Negative for environmental allergies, food allergies and immunocompromised state.   Neurological:  Positive for dizziness and light-headedness. Negative for tremors, seizures, syncope, speech difficulty, weakness, numbness and headaches.   Hematological:  Negative for adenopathy. Does not bruise/bleed easily.   Psychiatric/Behavioral:  Positive for sleep disturbance. Negative for agitation, confusion, decreased concentration, dysphoric mood and suicidal ideas. The patient is not nervous/anxious.        Objective   Vitals:    10/13/23 0751   BP: 132/70   BP Location: Left arm   Patient Position: Sitting   Cuff Size: Adult   Pulse: 78   SpO2: 99%   Weight: 77.5 kg (170 lb 12.8 oz)   Height: 165.1 cm (65\")     Physical Exam  Constitutional:       General: She is not in acute distress.     Appearance: Normal appearance. She is not diaphoretic.   HENT:      Head: Normocephalic and atraumatic.      Right Ear: Tympanic membrane, ear canal and external ear normal.      Left Ear: Tympanic membrane, ear canal and external ear normal.      Nose: Nose normal. No rhinorrhea.      Mouth/Throat:      Mouth: Mucous membranes are moist.      Pharynx: Oropharynx is clear.   Eyes:      General:         Right eye: No discharge.         Left eye: No discharge.      Conjunctiva/sclera: Conjunctivae normal.   Cardiovascular:      Rate and Rhythm: Normal rate and regular rhythm.      Pulses: Normal pulses.      Heart sounds: Normal heart sounds.   Pulmonary:      Effort: Pulmonary effort is normal.      Breath sounds: Normal breath sounds.   Abdominal:      General: Bowel sounds are normal.      Tenderness: There is no abdominal tenderness.   Musculoskeletal:         General: No swelling or tenderness.      Cervical back: Normal range of motion.   Skin:     General: Skin is warm and " dry.   Neurological:      General: No focal deficit present.      Mental Status: She is alert and oriented to person, place, and time.   Psychiatric:         Mood and Affect: Mood normal.         Behavior: Behavior normal.         Judgment: Judgment normal.         Assessment & Plan   Diagnoses and all orders for this visit:    1. PE (physical exam), annual (Primary)  Assessment & Plan:  - Discussed with patient routine health maintenance including health screenings and vaccines.       2. Meniere's disease of right ear  Assessment & Plan:  - Advised patient to take triamterene- hydrochlorothiazide and monitor blood pressure at home.       3. Postmenopausal  Assessment & Plan:  - DEXA scan has been ordered.     Orders:  -     DEXA Bone Density Axial; Future    4. Need for influenza vaccination  -     Fluzone (or Fluarix & Flulaval for VFC) >6mos    Discussed dizziness and elevated blood pressure today which may be related, she will start Triam-HCTZ daily and continue to monitor BP as well as symptoms. Call office if sx do not improve and/or with elevated BP readings.    Risk assessment:  She has a family hx (mother) of dementia-discussed safety concerns with Benadryl.  Her Body mass index is 28.42 kg/mý. She is exercising regularly and making lifestyle changes to help with weight loss (has recently returned to [employer]).    Prevention:  Health Maintenance   Topic Date Due    COLORECTAL CANCER SCREENING  03/12/2023    DXA SCAN  08/31/2023    COVID-19 Vaccine (6 - 2023-24 season) 09/01/2023    ANNUAL PHYSICAL  10/04/2023    BMI FOLLOWUP  12/30/2023    MAMMOGRAM  01/04/2024    LIPID PANEL  10/06/2024    TDAP/TD VACCINES (2 - Td or Tdap) 03/29/2029    HEPATITIS C SCREENING  Completed    INFLUENZA VACCINE  Completed    ZOSTER VACCINE  Completed    Pneumococcal Vaccine 0-64  Aged Out    PAP SMEAR  Discontinued       Discussed healthy lifestyle choices such as maintaining a balanced diet low in carbohydrates and  limiting caffeine and alcohol intake.  Recommended routine exercise for bone strength and cardiovascular health.    She is working with Dr. Chino's office to schedule a colonoscopy.         Transcribed from ambient dictation for RAY Mobley by Winnie Cordero.  10/13/23   09:32 EDT    Patient or patient representative verbalized consent to the visit recording.  I have personally performed the services described in this document as transcribed by the above individual, and it is both accurate and complete.

## 2023-10-18 ENCOUNTER — TELEPHONE (OUTPATIENT)
Dept: GASTROENTEROLOGY | Facility: CLINIC | Age: 63
End: 2023-10-18
Payer: COMMERCIAL

## 2024-02-12 ENCOUNTER — OFFICE VISIT (OUTPATIENT)
Dept: INTERNAL MEDICINE | Facility: CLINIC | Age: 64
End: 2024-02-12
Payer: COMMERCIAL

## 2024-02-12 VITALS
SYSTOLIC BLOOD PRESSURE: 142 MMHG | WEIGHT: 171.4 LBS | OXYGEN SATURATION: 99 % | HEART RATE: 69 BPM | BODY MASS INDEX: 28.56 KG/M2 | HEIGHT: 65 IN | DIASTOLIC BLOOD PRESSURE: 88 MMHG

## 2024-02-12 DIAGNOSIS — G89.29 CHRONIC RIGHT-SIDED LOW BACK PAIN WITH RIGHT-SIDED SCIATICA: Primary | ICD-10-CM

## 2024-02-12 DIAGNOSIS — M54.41 CHRONIC RIGHT-SIDED LOW BACK PAIN WITH RIGHT-SIDED SCIATICA: Primary | ICD-10-CM

## 2024-02-12 PROBLEM — Z00.00 PE (PHYSICAL EXAM), ANNUAL: Status: RESOLVED | Noted: 2023-10-13 | Resolved: 2024-02-12

## 2024-02-12 PROCEDURE — 99213 OFFICE O/P EST LOW 20 MIN: CPT | Performed by: NURSE PRACTITIONER

## 2024-04-16 ENCOUNTER — OFFICE VISIT (OUTPATIENT)
Dept: INTERNAL MEDICINE | Facility: CLINIC | Age: 64
End: 2024-04-16
Payer: COMMERCIAL

## 2024-04-16 VITALS
HEIGHT: 65 IN | WEIGHT: 170.4 LBS | DIASTOLIC BLOOD PRESSURE: 78 MMHG | SYSTOLIC BLOOD PRESSURE: 134 MMHG | OXYGEN SATURATION: 98 % | HEART RATE: 83 BPM | BODY MASS INDEX: 28.39 KG/M2

## 2024-04-16 DIAGNOSIS — M54.41 CHRONIC RIGHT-SIDED LOW BACK PAIN WITH RIGHT-SIDED SCIATICA: ICD-10-CM

## 2024-04-16 DIAGNOSIS — I10 PRIMARY HYPERTENSION: Primary | ICD-10-CM

## 2024-04-16 DIAGNOSIS — K21.9 GASTROESOPHAGEAL REFLUX DISEASE WITHOUT ESOPHAGITIS: Chronic | ICD-10-CM

## 2024-04-16 DIAGNOSIS — H40.053 DISEASE OF BOTH EYES CHARACTERIZED BY INCREASED EYE PRESSURE: ICD-10-CM

## 2024-04-16 DIAGNOSIS — G89.29 CHRONIC RIGHT-SIDED LOW BACK PAIN WITH RIGHT-SIDED SCIATICA: ICD-10-CM

## 2024-04-16 PROCEDURE — 99214 OFFICE O/P EST MOD 30 MIN: CPT | Performed by: NURSE PRACTITIONER

## 2024-04-25 ENCOUNTER — APPOINTMENT (OUTPATIENT)
Dept: GENERAL RADIOLOGY | Facility: HOSPITAL | Age: 64
End: 2024-04-25
Payer: COMMERCIAL

## 2024-04-25 ENCOUNTER — TELEPHONE (OUTPATIENT)
Dept: INTERNAL MEDICINE | Facility: CLINIC | Age: 64
End: 2024-04-25
Payer: COMMERCIAL

## 2024-04-25 ENCOUNTER — HOSPITAL ENCOUNTER (EMERGENCY)
Facility: HOSPITAL | Age: 64
Discharge: HOME | End: 2024-04-25
Attending: EMERGENCY MEDICINE
Payer: COMMERCIAL

## 2024-04-25 VITALS
HEIGHT: 65 IN | SYSTOLIC BLOOD PRESSURE: 139 MMHG | DIASTOLIC BLOOD PRESSURE: 82 MMHG | HEART RATE: 84 BPM | RESPIRATION RATE: 18 BRPM | BODY MASS INDEX: 27.66 KG/M2 | OXYGEN SATURATION: 99 % | TEMPERATURE: 97.5 F | WEIGHT: 166 LBS

## 2024-04-25 DIAGNOSIS — R00.2 PALPITATIONS: Primary | ICD-10-CM

## 2024-04-25 PROBLEM — H57.9 EYE PRESSURE: Chronic | Status: ACTIVE | Noted: 2021-09-30

## 2024-04-25 PROBLEM — G89.29 CHRONIC RIGHT-SIDED LOW BACK PAIN WITH RIGHT-SIDED SCIATICA: Status: ACTIVE | Noted: 2024-04-25

## 2024-04-25 PROBLEM — I10 PRIMARY HYPERTENSION: Chronic | Status: ACTIVE | Noted: 2024-04-25

## 2024-04-25 PROBLEM — M54.41 CHRONIC RIGHT-SIDED LOW BACK PAIN WITH RIGHT-SIDED SCIATICA: Chronic | Status: ACTIVE | Noted: 2024-04-25

## 2024-04-25 PROBLEM — G89.29 CHRONIC RIGHT-SIDED LOW BACK PAIN WITH RIGHT-SIDED SCIATICA: Chronic | Status: ACTIVE | Noted: 2024-04-25

## 2024-04-25 PROBLEM — H40.053: Status: ACTIVE | Noted: 2021-09-30

## 2024-04-25 PROBLEM — I10 PRIMARY HYPERTENSION: Status: ACTIVE | Noted: 2024-04-25

## 2024-04-25 PROBLEM — M54.41 CHRONIC RIGHT-SIDED LOW BACK PAIN WITH RIGHT-SIDED SCIATICA: Status: ACTIVE | Noted: 2024-04-25

## 2024-04-25 PROBLEM — H40.053: Chronic | Status: ACTIVE | Noted: 2021-09-30

## 2024-04-25 LAB
ALBUMIN SERPL-MCNC: 4.5 G/DL (ref 3.5–5.2)
ALBUMIN/GLOB SERPL: 1.6 G/DL
ALP SERPL-CCNC: 79 U/L (ref 39–117)
ALT SERPL W P-5'-P-CCNC: 15 U/L (ref 1–33)
ANION GAP SERPL CALCULATED.3IONS-SCNC: 11 MMOL/L (ref 5–15)
AST SERPL-CCNC: 16 U/L (ref 1–32)
BASOPHILS # BLD AUTO: 0.05 10*3/MM3 (ref 0–0.2)
BASOPHILS NFR BLD AUTO: 0.9 % (ref 0–1.5)
BILIRUB SERPL-MCNC: 0.3 MG/DL (ref 0–1.2)
BUN SERPL-MCNC: 22 MG/DL (ref 8–23)
BUN/CREAT SERPL: 23.7 (ref 7–25)
CALCIUM SPEC-SCNC: 9.3 MG/DL (ref 8.6–10.5)
CHLORIDE SERPL-SCNC: 104 MMOL/L (ref 98–107)
CO2 SERPL-SCNC: 26 MMOL/L (ref 22–29)
CREAT SERPL-MCNC: 0.93 MG/DL (ref 0.57–1)
DEPRECATED RDW RBC AUTO: 39.6 FL (ref 37–54)
EGFRCR SERPLBLD CKD-EPI 2021: 70.1 ML/MIN/1.73
EOSINOPHIL # BLD AUTO: 0.1 10*3/MM3 (ref 0–0.4)
EOSINOPHIL NFR BLD AUTO: 1.9 % (ref 0.3–6.2)
ERYTHROCYTE [DISTWIDTH] IN BLOOD BY AUTOMATED COUNT: 12.1 % (ref 12.3–15.4)
GLOBULIN UR ELPH-MCNC: 2.8 GM/DL
GLUCOSE SERPL-MCNC: 88 MG/DL (ref 65–99)
HCT VFR BLD AUTO: 44.8 % (ref 34–46.6)
HGB BLD-MCNC: 14.8 G/DL (ref 12–15.9)
HOLD SPECIMEN: NORMAL
HOLD SPECIMEN: NORMAL
IMM GRANULOCYTES # BLD AUTO: 0.01 10*3/MM3 (ref 0–0.05)
IMM GRANULOCYTES NFR BLD AUTO: 0.2 % (ref 0–0.5)
LYMPHOCYTES # BLD AUTO: 1.92 10*3/MM3 (ref 0.7–3.1)
LYMPHOCYTES NFR BLD AUTO: 36 % (ref 19.6–45.3)
MAGNESIUM SERPL-MCNC: 2 MG/DL (ref 1.6–2.4)
MCH RBC QN AUTO: 29.3 PG (ref 26.6–33)
MCHC RBC AUTO-ENTMCNC: 33 G/DL (ref 31.5–35.7)
MCV RBC AUTO: 88.7 FL (ref 79–97)
MONOCYTES # BLD AUTO: 0.44 10*3/MM3 (ref 0.1–0.9)
MONOCYTES NFR BLD AUTO: 8.3 % (ref 5–12)
NEUTROPHILS NFR BLD AUTO: 2.81 10*3/MM3 (ref 1.7–7)
NEUTROPHILS NFR BLD AUTO: 52.7 % (ref 42.7–76)
NRBC BLD AUTO-RTO: 0 /100 WBC (ref 0–0.2)
PLATELET # BLD AUTO: 238 10*3/MM3 (ref 140–450)
PMV BLD AUTO: 9.1 FL (ref 6–12)
POTASSIUM SERPL-SCNC: 3.8 MMOL/L (ref 3.5–5.2)
PROT SERPL-MCNC: 7.3 G/DL (ref 6–8.5)
QT INTERVAL: 395 MS
QTC INTERVAL: 421 MS
RBC # BLD AUTO: 5.05 10*6/MM3 (ref 3.77–5.28)
SODIUM SERPL-SCNC: 141 MMOL/L (ref 136–145)
T4 FREE SERPL-MCNC: 1.13 NG/DL (ref 0.93–1.7)
TROPONIN T SERPL HS-MCNC: 7 NG/L
TSH SERPL DL<=0.05 MIU/L-ACNC: 1.97 UIU/ML (ref 0.27–4.2)
WBC NRBC COR # BLD AUTO: 5.33 10*3/MM3 (ref 3.4–10.8)
WHOLE BLOOD HOLD COAG: NORMAL
WHOLE BLOOD HOLD SPECIMEN: NORMAL

## 2024-04-25 PROCEDURE — 84439 ASSAY OF FREE THYROXINE: CPT | Performed by: PHYSICIAN ASSISTANT

## 2024-04-25 PROCEDURE — 99284 EMERGENCY DEPT VISIT MOD MDM: CPT

## 2024-04-25 PROCEDURE — 80053 COMPREHEN METABOLIC PANEL: CPT | Performed by: EMERGENCY MEDICINE

## 2024-04-25 PROCEDURE — 83735 ASSAY OF MAGNESIUM: CPT | Performed by: PHYSICIAN ASSISTANT

## 2024-04-25 PROCEDURE — 71045 X-RAY EXAM CHEST 1 VIEW: CPT

## 2024-04-25 PROCEDURE — 93010 ELECTROCARDIOGRAM REPORT: CPT | Performed by: INTERNAL MEDICINE

## 2024-04-25 PROCEDURE — 84484 ASSAY OF TROPONIN QUANT: CPT | Performed by: EMERGENCY MEDICINE

## 2024-04-25 PROCEDURE — 93005 ELECTROCARDIOGRAM TRACING: CPT | Performed by: EMERGENCY MEDICINE

## 2024-04-25 PROCEDURE — 84443 ASSAY THYROID STIM HORMONE: CPT | Performed by: PHYSICIAN ASSISTANT

## 2024-04-25 PROCEDURE — 93005 ELECTROCARDIOGRAM TRACING: CPT

## 2024-04-25 PROCEDURE — 85025 COMPLETE CBC W/AUTO DIFF WBC: CPT | Performed by: EMERGENCY MEDICINE

## 2024-04-25 RX ORDER — ASPIRIN 325 MG
325 TABLET ORAL ONCE
Status: DISCONTINUED | OUTPATIENT
Start: 2024-04-25 | End: 2024-04-25

## 2024-04-25 RX ORDER — SODIUM CHLORIDE 0.9 % (FLUSH) 0.9 %
10 SYRINGE (ML) INJECTION AS NEEDED
Status: DISCONTINUED | OUTPATIENT
Start: 2024-04-25 | End: 2024-04-25 | Stop reason: HOSPADM

## 2024-04-25 NOTE — TELEPHONE ENCOUNTER
Caller: Carlie Brennan    Relationship to patient: Self    Best call back number: [phone]     Chief complaint: IRREGULAR HEARTBEAT    Patient directed to call 911 or go to their nearest emergency room.     Patient verbalized understanding: [x] Yes  [] No  If no, why?    Additional notes:PATIENT IS HEADING THERE NOW AND WILL CALL IF SHE NEEDS ANYTHING FROM US

## 2024-04-25 NOTE — PROGRESS NOTES
"Chief Complaint  Back Pain (6 month follow up)  Subjective        Carlie Brennan presents to [facility]  Back Pain      History of Present Illness  The patient is a 63-year-old female who presents for evaluation of multiple medical concerns.    The patient reports an improvement in her back condition. However, she experiences discomfort in her sciatic nerve when seated for extended periods, which radiates up the back of her leg. She denies any weakness in her right leg.    The patient does not monitor her blood pressure at home. She adheres to a regimen of triamterene-hydrochlorothiazide three times a week. She has noted a weight gain of 6 to 8 pounds. She acknowledges a tendency to consume sweets.     Her bowel movements are regular, and she denies any presence of blood in her stool. She maintains an active lifestyle, working out 5 days a week, utilizing the elliptical machine on Tuesdays and Thursdays, and treadmill 3 days a week. She does not engage in weightlifting or resistance training.    Supplemental Information  She denies any exacerbations of her Meniere's disease.  She was undergoing Aldara treatment for precancerous spots on her face, with the right side being more affected than the left. It has been a couple of weeks since she stopped the treatment.   Her eye pressures were elevated in 03/2024 but stable when she saw Dr. Holt.    Objective   Vital Signs:  /78 (BP Location: Left arm, Patient Position: Sitting, Cuff Size: Adult)   Pulse 83   Ht 165.1 cm (65\")   Wt 77.3 kg (170 lb 6.4 oz)   SpO2 98%   BMI 28.36 kg/m²   Estimated body mass index is 28.36 kg/m² as calculated from the following:    Height as of this encounter: 165.1 cm (65\").    Weight as of this encounter: 77.3 kg (170 lb 6.4 oz).            Physical Exam  Constitutional:       Appearance: She is well-developed. She is not ill-appearing.   HENT:      Head: Normocephalic.      Right Ear: " Hearing, tympanic membrane and external ear normal.      Left Ear: Hearing, tympanic membrane and external ear normal.      Nose: Nose normal. No nasal deformity, mucosal edema or rhinorrhea.      Right Sinus: No maxillary sinus tenderness or frontal sinus tenderness.      Left Sinus: No maxillary sinus tenderness or frontal sinus tenderness.      Mouth/Throat:      Dentition: Normal dentition.      Pharynx: Posterior oropharyngeal erythema present.   Eyes:      General: Lids are normal.         Right eye: No discharge.         Left eye: No discharge.      Conjunctiva/sclera: Conjunctivae normal.      Right eye: No exudate.     Left eye: No exudate.  Neck:      Thyroid: No thyroid mass or thyromegaly.      Vascular: No carotid bruit.      Trachea: Trachea normal.   Cardiovascular:      Rate and Rhythm: Regular rhythm.      Pulses: Normal pulses.      Heart sounds: Normal heart sounds. No murmur heard.  Pulmonary:      Effort: No respiratory distress.      Breath sounds: Normal breath sounds. No decreased breath sounds, wheezing, rhonchi or rales.   Abdominal:      General: Bowel sounds are normal.      Palpations: Abdomen is soft.      Tenderness: There is no abdominal tenderness.   Musculoskeletal:      Cervical back: Normal range of motion. No edema.      Lumbar back: Tenderness present.   Lymphadenopathy:      Head:      Right side of head: No submental, submandibular, tonsillar, preauricular, posterior auricular or occipital adenopathy.      Left side of head: No submental, submandibular, tonsillar, preauricular, posterior auricular or occipital adenopathy.   Skin:     General: Skin is warm and dry.      Nails: There is no clubbing.   Neurological:      Mental Status: She is alert.   Psychiatric:         Behavior: Behavior is cooperative.        Physical Exam  Vital Signs  The patient's blood pressure is 134/78.    Result Review :  The following data was reviewed by: RAY Mobley on 04/16/2024:  Common  labs          10/6/2023    08:12   Common Labs   Glucose 91    BUN 16    Creatinine 0.87    Sodium 142    Potassium 4.2    Chloride 104    Calcium 9.8    Total Protein 7.1    Albumin 4.5    Total Bilirubin 0.4    Alkaline Phosphatase 90    AST (SGOT) 25    ALT (SGPT) 19    WBC 4.67    Hemoglobin 15.6    Hematocrit 46.3    Platelets 247    Total Cholesterol 208    Triglycerides 111    HDL Cholesterol 49    LDL Cholesterol  139           Results               Assessment and Plan   Diagnoses and all orders for this visit:    1. Primary hypertension (Primary)  Assessment & Plan:  The patient's blood pressure was initially elevated, but upon recheck, it was found to be 134/78. The patient was advised to monitor her blood pressure regularly. She is currently taking Triam-HCTZ 3 times a weekly. We discussed taking medication daily if BP remains elevated, continue to monitor.      2. Chronic right-sided low back pain with right-sided sciatica  Assessment & Plan:  She is now using a standing desk at work which has been helpful but still with sciatica pain with prolonged sitting. Discussed stretching exercises, may take ibuprofen as needed prior to travel since this has been a trigger the past. Consider further evaluation if sx persist.      3. Gastroesophageal reflux disease without esophagitis  Assessment & Plan:  She takes omeprazole only as needed with good mgmt of sx, continue to monitor.      4. 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 Boles        Assessment & Plan      Health maintenance.  The patient was encouraged to schedule her colonoscopy which is overdue.         Follow Up   Return in about 6 months (around 10/16/2024) for Annual physical.  Patient was given instructions and counseling regarding her condition or for health maintenance advice. Please see specific information pulled into the AVS if  appropriate.     Patient or patient representative verbalized consent for the use of Ambient Listening during the visit with  RAY Mobley for chart documentation. 4/25/2024  10:05 EDT  Answers submitted by the patient for this visit:  Primary Reason for Visit (Submitted on 4/15/2024)  What is the primary reason for your visit?: Back Pain

## 2024-04-25 NOTE — ASSESSMENT & PLAN NOTE
She is now using a standing desk at work which has been helpful but still with sciatica pain with prolonged sitting. Discussed stretching exercises, may take ibuprofen as needed prior to travel since this has been a trigger the past. Consider further evaluation if sx persist.

## 2024-04-25 NOTE — ED PROVIDER NOTES
EMERGENCY DEPARTMENT ENCOUNTER    Room Number:  08/08  Date of encounter:  4/25/2024  PCP: Ashli Warren APRN  Historian: Patient  Chronic or social conditions impacting care (social determinants of health): None    HPI:  Chief Complaint: Palpitations  A complete HPI/ROS/PMH/PSH/SH/FH are unobtainable due to: Nothing    Context: aCrlie Brennan is a 63 y.o. female with a history of mitral valve prolapse, iron deficiency anemia.  She presents to the ED c/o acute palpitations for 12 hours.  Patient reports feeling extra beats or a spasm in her left anterior chest intermittently since last night.  She states these episodes seem to last several seconds and then resolved.  She denies any precipitating or alleviating factors.  She denies any associated chest pain, shortness of breath, syncope, near syncope.  She denies any significant heart or lung issues.  She does have history of mitral valve prolapse.  She was able to work out this morning without any difficulty.    Review of prior external notes (non-ED):   I reviewed internal medicine office visit from 4/16/2024.  Patient being followed for hypertension, GERD.    Review of prior external test results outside of this encounter:  I reviewed a CMP from 10/6/2023.  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    Overweight (BMI 25.0-29.9) 01/27/2020    Disease of both eyes characterized by increased eye pressure 09/30/2021    Varicose veins of both lower extremities with pain 10/04/2022    Postmenopausal 10/13/2023    Primary hypertension 04/25/2024    Chronic right-sided low back pain with right-sided sciatica 04/25/2024     Resolved Ambulatory Problems     Diagnosis Date Noted     Hyperlipidemia 2016    Hair loss 2016    Screen for colon cancer 2018    Iron deficiency anemia 2018    Polycythemia 2018    PE (physical exam), annual 10/13/2023     Past Medical History:   Diagnosis Date    GERD (gastroesophageal reflux disease)     Hypertension     Pregnancy     Vertigo          PAST SURGICAL HISTORY  Past Surgical History:   Procedure Laterality Date    ABDOMINOPLASTY       SECTION      x 2    COLONOSCOPY N/A 3/12/2018    Procedure: COLONOSCOPY INTO CECUM AND NORMAL TI WITH HOT SNARE POLYPECTOMY;  Surgeon: Lincoln Chino MD;  Location: [facility] ENDOSCOPY;  Service: Gastroenterology    COSMETIC SURGERY  10/2016    Abdominoplasty    LASIK           FAMILY HISTORY  Family History   Problem Relation Age of Onset    Meniere's disease Mother     Dementia Mother     Other Father         Accident    Migraines Sister     Suicidality Brother     Coronary artery disease Brother          SOCIAL HISTORY  Social History     Socioeconomic History    Marital status: Single   Tobacco Use    Smoking status: Never    Smokeless tobacco: Never   Vaping Use    Vaping status: Never Used   Substance and Sexual Activity    Alcohol use: No    Drug use: No    Sexual activity: Not Currently     Partners: Female     Birth control/protection: Post-menopausal         ALLERGIES  Patient has no known allergies.        REVIEW OF SYSTEMS  All systems reviewed and negative except for those discussed in HPI.       PHYSICAL EXAM    I have reviewed the triage vital signs and nursing notes.    ED Triage Vitals [24 1011]   Temp Heart Rate Resp BP SpO2   97.5 °F (36.4 °C) 79 18 -- 98 %      Temp src Heart Rate Source Patient Position BP Location FiO2 (%)   -- -- -- -- --       Physical Exam  GENERAL: Alert, oriented, not distressed  HENT: head atraumatic, no nuchal rigidity  EYES: no scleral icterus, EOMI  CV: regular rhythm, regular rate, no murmur  RESPIRATORY: normal effort,  CTA  ABDOMEN: soft, nontender  MUSCULOSKELETAL: no deformity, FROM, no calf swelling or tenderness  NEURO: alert, moves all extremities, follows commands  SKIN: warm, dry        LAB RESULTS  Recent Results (from the past 24 hour(s))   ECG 12 Lead ED Triage Standing Order; Chest Pain    Collection Time: 04/25/24 10:15 AM   Result Value Ref Range    QT Interval 395 ms    QTC Interval 421 ms   Comprehensive Metabolic Panel    Collection Time: 04/25/24 10:51 AM    Specimen: Blood   Result Value Ref Range    Glucose 88 65 - 99 mg/dL    BUN 22 8 - 23 mg/dL    Creatinine 0.93 0.57 - 1.00 mg/dL    Sodium 141 136 - 145 mmol/L    Potassium 3.8 3.5 - 5.2 mmol/L    Chloride 104 98 - 107 mmol/L    CO2 26.0 22.0 - 29.0 mmol/L    Calcium 9.3 8.6 - 10.5 mg/dL    Total Protein 7.3 6.0 - 8.5 g/dL    Albumin 4.5 3.5 - 5.2 g/dL    ALT (SGPT) 15 1 - 33 U/L    AST (SGOT) 16 1 - 32 U/L    Alkaline Phosphatase 79 39 - 117 U/L    Total Bilirubin 0.3 0.0 - 1.2 mg/dL    Globulin 2.8 gm/dL    A/G Ratio 1.6 g/dL    BUN/Creatinine Ratio 23.7 7.0 - 25.0    Anion Gap 11.0 5.0 - 15.0 mmol/L    eGFR 70.1 >60.0 mL/min/1.73   High Sensitivity Troponin T    Collection Time: 04/25/24 10:51 AM    Specimen: Blood   Result Value Ref Range    HS Troponin T 7 <14 ng/L   Green Top (Gel)    Collection Time: 04/25/24 10:51 AM   Result Value Ref Range    Extra Tube Hold for add-ons.    Lavender Top    Collection Time: 04/25/24 10:51 AM   Result Value Ref Range    Extra Tube hold for add-on    Gold Top - SST    Collection Time: 04/25/24 10:51 AM   Result Value Ref Range    Extra Tube Hold for add-ons.    Light Blue Top    Collection Time: 04/25/24 10:51 AM   Result Value Ref Range    Extra Tube Hold for add-ons.    CBC Auto Differential    Collection Time: 04/25/24 10:51 AM    Specimen: Blood   Result Value Ref Range    WBC 5.33 3.40 - 10.80 10*3/mm3    RBC 5.05 3.77 - 5.28 10*6/mm3    Hemoglobin 14.8 12.0 - 15.9 g/dL    Hematocrit 44.8 34.0 - 46.6 %    MCV 88.7  79.0 - 97.0 fL    MCH 29.3 26.6 - 33.0 pg    MCHC 33.0 31.5 - 35.7 g/dL    RDW 12.1 (L) 12.3 - 15.4 %    RDW-SD 39.6 37.0 - 54.0 fl    MPV 9.1 6.0 - 12.0 fL    Platelets 238 140 - 450 10*3/mm3    Neutrophil % 52.7 42.7 - 76.0 %    Lymphocyte % 36.0 19.6 - 45.3 %    Monocyte % 8.3 5.0 - 12.0 %    Eosinophil % 1.9 0.3 - 6.2 %    Basophil % 0.9 0.0 - 1.5 %    Immature Grans % 0.2 0.0 - 0.5 %    Neutrophils, Absolute 2.81 1.70 - 7.00 10*3/mm3    Lymphocytes, Absolute 1.92 0.70 - 3.10 10*3/mm3    Monocytes, Absolute 0.44 0.10 - 0.90 10*3/mm3    Eosinophils, Absolute 0.10 0.00 - 0.40 10*3/mm3    Basophils, Absolute 0.05 0.00 - 0.20 10*3/mm3    Immature Grans, Absolute 0.01 0.00 - 0.05 10*3/mm3    nRBC 0.0 0.0 - 0.2 /100 WBC   Magnesium    Collection Time: 04/25/24 10:51 AM    Specimen: Blood   Result Value Ref Range    Magnesium 2.0 1.6 - 2.4 mg/dL   TSH    Collection Time: 04/25/24 10:51 AM    Specimen: Blood   Result Value Ref Range    TSH 1.970 0.270 - 4.200 uIU/mL   T4, Free    Collection Time: 04/25/24 10:51 AM    Specimen: Blood   Result Value Ref Range    Free T4 1.13 0.93 - 1.70 ng/dL       Ordered the above labs and independently reviewed the results.        RADIOLOGY  XR Chest 1 View    Result Date: 4/25/2024  XR CHEST 1 VW-  Clinical: Palpitation, chest pain  COMPARISON: None  FINDINGS: There is some increased density in the right and left suprahilar region which is likely secondary to first rib and calcification. Cardiac size within normal limits. No mediastinal or hilar abnormality seen. No pleural effusion or vascular congestion or acute airspace disease is demonstrated.  CONCLUSION: No active disease in the chest  This report was finalized on 4/25/2024 10:49 AM by Dr. Frankie Julian M.D on Workstation: [id number]       I ordered the above noted radiological studies. Reviewed by me and discussed with radiologist.  See dictation for official radiology interpretation.      MEDICATIONS GIVEN IN ER    Medications  - No data to display        ADDITIONAL ORDERS CONSIDERED BUT NOT ORDERED:  Considered admission however patient symptoms seem to be improving.  She has a normal workup here.      PROGRESS, DATA ANALYSIS, CONSULTS, AND MEDICAL DECISION MAKING    All labs have been independently interpreted by myself.  All radiology studies have been independently interpreted by myself and discussed with radiologist dictating the report.   EKG's independently interpreted by myself.  Discussion below represents my analysis of pertinent findings related to patient's condition, differential diagnosis, treatment plan and final disposition.    I have discussed case with Dr. Koch, emergency room physician.  He has performed his own bedside examination and agrees with treatment plan.    ED Course as of 04/25/24 1445   Thu Apr 25, 2024   1042 Patient presents with approximately 12-hour history of intermittent palpitations.  She denies any chest pain, shortness of breath, syncope.  She is alert and oriented at this time with stable vitals.  Plan for basic labs, EKG. [EE]   1044 EKG independently viewed and contemporaneously interpreted by ED physician. Time: 10:15 AM.  Rate 68.  Interpretation: Normal sinus rhythm, normal axis, normal QRS, no acute ST changes. [JG]   1133 WBC: 5.33 [EE]   1133 Hemoglobin: 14.8 [EE]   1143 Magnesium: 2.0 [EE]   1209 Shared decision making with the patient.  She states her palpitations have improved while here in the ER.  She has a very reassuring workup.  I have offered her a Zio patch now or continue to monitor with outpatient follow-up.  She would prefer to hold off in any further testing.  We will give her the name of the cardiologist for any worsening symptoms. [EE]      ED Course User Index  [EE] Arsenio Snyder PA  [JG] Willis Koch MD       AS OF 14:45 EDT VITALS:    BP - 139/82  HR - 84  TEMP - 97.5 °F (36.4 °C)  O2 SATS - 99%        DIAGNOSIS  Final diagnoses:   Palpitations          DISPOSITION  Discharged    Admission was considered but after careful review of the patient's presentation, physical examination, diagnostic results, and response to treatment the patient may be safely discharged with outpatient follow-up.         Dictated utilizing Dragon dictation     Arsenio Snyder PA  04/25/24 1448

## 2024-04-25 NOTE — ASSESSMENT & PLAN NOTE
The patient's blood pressure was initially elevated, but upon recheck, it was found to be 134/78. The patient was advised to monitor her blood pressure regularly. She is currently taking Triam-HCTZ 3 times a weekly. We discussed taking medication daily if BP remains elevated, continue to monitor.

## 2024-05-16 ENCOUNTER — TELEPHONE (OUTPATIENT)
Dept: GASTROENTEROLOGY | Facility: CLINIC | Age: 64
End: 2024-05-16
Payer: COMMERCIAL

## 2024-05-16 NOTE — TELEPHONE ENCOUNTER
ORLY PATRICK IN SCHEDULING PT SCHEDULED 05/22/2024 ARRIVING AT 845AM [employer] PREP INFORMATION MAILED TO ADDRESS ON FILE VERIFIED BY THE PT.OK FOR HUB TO RELAY

## 2024-05-16 NOTE — TELEPHONE ENCOUNTER
Caller: Carlie Brennan    Relationship: Self    Best call back number: [phone]    What is the best time to reach you: ANYTIME    Who are you requesting to speak with (clinical staff, provider,  specific staff member): FRONT      What was the call regarding: PATIENT WAS SCHEDULED LAST YEAR FOR HER COLONOSCOPY AND IT WAS CANCELLED, SHE NEEDS TO RESCHEDULE.

## 2024-05-16 NOTE — TELEPHONE ENCOUNTER
ORLY PATRICK IN SCHEDULING PT SCHEDULED 05/22/2024 ARRIVING AT 845AM [employer] PREP INFORMATION MAILED TO ADDRESS ON FILE VERIFIED BY THE PT.OK FOR HUB TO RELAY

## 2024-05-17 RX ORDER — TRIAMTERENE AND HYDROCHLOROTHIAZIDE 37.5; 25 MG/1; MG/1
1 TABLET ORAL DAILY
Qty: 30 TABLET | Refills: 2 | Status: SHIPPED | OUTPATIENT
Start: 2024-05-17

## 2024-05-21 ENCOUNTER — TELEPHONE (OUTPATIENT)
Dept: GASTROENTEROLOGY | Facility: CLINIC | Age: 64
End: 2024-05-21
Payer: COMMERCIAL

## 2024-05-21 NOTE — TELEPHONE ENCOUNTER
Caller: Carlie Brennan    Relationship: Self    Best call back number: [phone].    What is the best time to reach you: BlueVoxHART    Who are you requesting to speak with (clinical staff, provider,  specific staff member):         What was the call regarding: PT STATES SHE NEVER REC'D PREP INFORMATION. PROC IS 05.22.24. PLEASE SEND PREP, TIME OF ARRIVAL, LOCATION, ETC VIA [facility].     Is it okay if the provider responds through [employer]: YES

## 2024-05-21 NOTE — TELEPHONE ENCOUNTER
[employer] PREP INFORMATION PLACED IN PT MY CHART CALLED PT AND COMMUNICATED THIS ACTION,OK FOR HUB TO RELAY

## 2024-05-22 ENCOUNTER — ANESTHESIA EVENT (OUTPATIENT)
Dept: GASTROENTEROLOGY | Facility: HOSPITAL | Age: 64
End: 2024-05-22
Payer: COMMERCIAL

## 2024-05-22 ENCOUNTER — ANESTHESIA (OUTPATIENT)
Dept: GASTROENTEROLOGY | Facility: HOSPITAL | Age: 64
End: 2024-05-22
Payer: COMMERCIAL

## 2024-05-22 ENCOUNTER — HOSPITAL ENCOUNTER (OUTPATIENT)
Facility: HOSPITAL | Age: 64
Setting detail: HOSPITAL OUTPATIENT SURGERY
Discharge: HOME | End: 2024-05-22
Attending: INTERNAL MEDICINE | Admitting: INTERNAL MEDICINE
Payer: COMMERCIAL

## 2024-05-22 VITALS
WEIGHT: 167.3 LBS | SYSTOLIC BLOOD PRESSURE: 125 MMHG | HEIGHT: 65 IN | HEART RATE: 62 BPM | OXYGEN SATURATION: 96 % | BODY MASS INDEX: 27.88 KG/M2 | RESPIRATION RATE: 16 BRPM | DIASTOLIC BLOOD PRESSURE: 79 MMHG

## 2024-05-22 DIAGNOSIS — Z86.010 HISTORY OF ADENOMATOUS POLYP OF COLON: ICD-10-CM

## 2024-05-22 PROCEDURE — 45385 COLONOSCOPY W/LESION REMOVAL: CPT | Performed by: INTERNAL MEDICINE

## 2024-05-22 PROCEDURE — 45380 COLONOSCOPY AND BIOPSY: CPT | Performed by: INTERNAL MEDICINE

## 2024-05-22 PROCEDURE — 25010000002 PROPOFOL 1000 MG/100ML EMULSION: Performed by: NURSE ANESTHETIST, CERTIFIED REGISTERED

## 2024-05-22 PROCEDURE — S0260 H&P FOR SURGERY: HCPCS | Performed by: INTERNAL MEDICINE

## 2024-05-22 PROCEDURE — 25810000003 LACTATED RINGERS PER 1000 ML: Performed by: INTERNAL MEDICINE

## 2024-05-22 PROCEDURE — 88305 TISSUE EXAM BY PATHOLOGIST: CPT | Performed by: INTERNAL MEDICINE

## 2024-05-22 RX ORDER — OXYCODONE AND ACETAMINOPHEN 7.5; 325 MG/1; MG/1
1 TABLET ORAL EVERY 4 HOURS PRN
Status: DISCONTINUED | OUTPATIENT
Start: 2024-05-22 | End: 2024-05-22 | Stop reason: HOSPADM

## 2024-05-22 RX ORDER — HYDROMORPHONE HYDROCHLORIDE 2 MG/ML
0.5 INJECTION, SOLUTION INTRAMUSCULAR; INTRAVENOUS; SUBCUTANEOUS
Status: DISCONTINUED | OUTPATIENT
Start: 2024-05-22 | End: 2024-05-22 | Stop reason: HOSPADM

## 2024-05-22 RX ORDER — PROMETHAZINE HYDROCHLORIDE 25 MG/1
25 SUPPOSITORY RECTAL ONCE AS NEEDED
Status: DISCONTINUED | OUTPATIENT
Start: 2024-05-22 | End: 2024-05-22 | Stop reason: HOSPADM

## 2024-05-22 RX ORDER — HYDRALAZINE HYDROCHLORIDE 20 MG/ML
5 INJECTION INTRAMUSCULAR; INTRAVENOUS
Status: DISCONTINUED | OUTPATIENT
Start: 2024-05-22 | End: 2024-05-22 | Stop reason: HOSPADM

## 2024-05-22 RX ORDER — HYDROCODONE BITARTRATE AND ACETAMINOPHEN 5; 325 MG/1; MG/1
1 TABLET ORAL ONCE AS NEEDED
Status: DISCONTINUED | OUTPATIENT
Start: 2024-05-22 | End: 2024-05-22 | Stop reason: HOSPADM

## 2024-05-22 RX ORDER — FLUMAZENIL 0.1 MG/ML
0.2 INJECTION INTRAVENOUS AS NEEDED
Status: DISCONTINUED | OUTPATIENT
Start: 2024-05-22 | End: 2024-05-22 | Stop reason: HOSPADM

## 2024-05-22 RX ORDER — FENTANYL CITRATE 50 UG/ML
50 INJECTION, SOLUTION INTRAMUSCULAR; INTRAVENOUS
Status: DISCONTINUED | OUTPATIENT
Start: 2024-05-22 | End: 2024-05-22 | Stop reason: HOSPADM

## 2024-05-22 RX ORDER — DIPHENHYDRAMINE HYDROCHLORIDE 50 MG/ML
12.5 INJECTION INTRAMUSCULAR; INTRAVENOUS
Status: DISCONTINUED | OUTPATIENT
Start: 2024-05-22 | End: 2024-05-22 | Stop reason: HOSPADM

## 2024-05-22 RX ORDER — PROPOFOL 10 MG/ML
INJECTION, EMULSION INTRAVENOUS AS NEEDED
Status: DISCONTINUED | OUTPATIENT
Start: 2024-05-22 | End: 2024-05-22 | Stop reason: SURG

## 2024-05-22 RX ORDER — LIDOCAINE HYDROCHLORIDE 20 MG/ML
INJECTION, SOLUTION INFILTRATION; PERINEURAL AS NEEDED
Status: DISCONTINUED | OUTPATIENT
Start: 2024-05-22 | End: 2024-05-22 | Stop reason: SURG

## 2024-05-22 RX ORDER — IPRATROPIUM BROMIDE AND ALBUTEROL SULFATE 2.5; .5 MG/3ML; MG/3ML
3 SOLUTION RESPIRATORY (INHALATION) ONCE AS NEEDED
Status: DISCONTINUED | OUTPATIENT
Start: 2024-05-22 | End: 2024-05-22 | Stop reason: HOSPADM

## 2024-05-22 RX ORDER — NALOXONE HCL 0.4 MG/ML
0.2 VIAL (ML) INJECTION AS NEEDED
Status: DISCONTINUED | OUTPATIENT
Start: 2024-05-22 | End: 2024-05-22 | Stop reason: HOSPADM

## 2024-05-22 RX ORDER — LABETALOL HYDROCHLORIDE 5 MG/ML
5 INJECTION, SOLUTION INTRAVENOUS
Status: DISCONTINUED | OUTPATIENT
Start: 2024-05-22 | End: 2024-05-22 | Stop reason: HOSPADM

## 2024-05-22 RX ORDER — SODIUM CHLORIDE, SODIUM LACTATE, POTASSIUM CHLORIDE, CALCIUM CHLORIDE 600; 310; 30; 20 MG/100ML; MG/100ML; MG/100ML; MG/100ML
1000 INJECTION, SOLUTION INTRAVENOUS CONTINUOUS
Status: DISCONTINUED | OUTPATIENT
Start: 2024-05-22 | End: 2024-05-22 | Stop reason: HOSPADM

## 2024-05-22 RX ORDER — ONDANSETRON 2 MG/ML
4 INJECTION INTRAMUSCULAR; INTRAVENOUS ONCE AS NEEDED
Status: DISCONTINUED | OUTPATIENT
Start: 2024-05-22 | End: 2024-05-22 | Stop reason: HOSPADM

## 2024-05-22 RX ORDER — EPHEDRINE SULFATE 50 MG/ML
5 INJECTION, SOLUTION INTRAVENOUS ONCE AS NEEDED
Status: DISCONTINUED | OUTPATIENT
Start: 2024-05-22 | End: 2024-05-22 | Stop reason: HOSPADM

## 2024-05-22 RX ORDER — DROPERIDOL 2.5 MG/ML
0.62 INJECTION, SOLUTION INTRAMUSCULAR; INTRAVENOUS
Status: DISCONTINUED | OUTPATIENT
Start: 2024-05-22 | End: 2024-05-22 | Stop reason: HOSPADM

## 2024-05-22 RX ORDER — PROMETHAZINE HYDROCHLORIDE 25 MG/1
25 TABLET ORAL ONCE AS NEEDED
Status: DISCONTINUED | OUTPATIENT
Start: 2024-05-22 | End: 2024-05-22 | Stop reason: HOSPADM

## 2024-05-22 RX ADMIN — SODIUM CHLORIDE, POTASSIUM CHLORIDE, SODIUM LACTATE AND CALCIUM CHLORIDE 1000 ML: 600; 310; 30; 20 INJECTION, SOLUTION INTRAVENOUS at 09:01

## 2024-05-22 RX ADMIN — PROPOFOL INJECTABLE EMULSION 140 MG: 10 INJECTION, EMULSION INTRAVENOUS at 11:54

## 2024-05-22 RX ADMIN — PROPOFOL INJECTABLE EMULSION 80 MG: 10 INJECTION, EMULSION INTRAVENOUS at 11:53

## 2024-05-22 RX ADMIN — LIDOCAINE HYDROCHLORIDE 100 MG: 20 INJECTION, SOLUTION INFILTRATION; PERINEURAL at 11:53

## 2024-05-22 NOTE — BRIEF OP NOTE
COLONOSCOPY  Progress Note    Carlie Brennan  5/22/2024    Pre-op Diagnosis:   History of adenomatous polyp of colon [Z86.010]       Post-Op Diagnosis Codes:     * History of adenomatous polyp of colon [Z86.010]     * Colon polyps [K63.5]     * Diverticulosis [K57.90]     * Internal hemorrhoids [K64.8]    Procedure/CPT® Codes:        Procedure(s):  COLONOSCOPY INTO CECUM AND TI WITH POLYPECTOMIES (COLD BX/COLD SNARE)              Surgeon(s):  Lincoln Chino MD    Anesthesia: Monitored Anesthesia Care    Staff:   Endo Technician: Nohemi Prieto; Belkys Arias  Endo Nurse: Eloina Forrester RN; Lashonda Jones RN         Estimated Blood Loss: 0 mL    Urine Voided: * No values recorded between 5/22/2024 11:53 AM and 5/22/2024 12:19 PM *    Specimens:                Specimens       ID Source Type Tests Collected By Collected At Frozen?    A Large Intestine, Cecum Polyp TISSUE PATHOLOGY EXAM   Lincoln Chino MD 5/22/24 1205 No    Description: CECAL POLYP    B Large Intestine, Right / Ascending Colon Polyp TISSUE PATHOLOGY EXAM   Lincoln Chino MD 5/22/24 1209 No    Description: ASCENDING COLON POLYPS    C Large Intestine, Transverse Colon Polyp TISSUE PATHOLOGY EXAM   Lincoln Chino MD 5/22/24 1213 No    Description: transverse polyp                  Drains: * No LDAs found *    Findings: Colonoscopy to terminal ileum with normal ileum mucosa.  Colon polyps in the cecum ascending and transverse colon with cold biopsy cold snare.  Right-sided diverticulosis with internal hemorrhoids noted as well.        Complications: None          Lincoln Chino MD     Date: 5/22/2024  Time: 12:19 EDT

## 2024-05-22 NOTE — ANESTHESIA POSTPROCEDURE EVALUATION
Patient: Carlie Brennan    Procedure Summary       Date: 05/22/24 Room / Location: [facility] ENDOSCOPY 8 / [facility] ENDOSCOPY    Anesthesia Start: 1152 Anesthesia Stop: 1222    Procedure: COLONOSCOPY INTO CECUM AND TI WITH POLYPECTOMIES (COLD BX/COLD SNARE) Diagnosis:       History of adenomatous polyp of colon      Colon polyps      Diverticulosis      Internal hemorrhoids      (History of adenomatous polyp of colon [Z86.010])    Surgeons: Lincoln Chino MD Provider: Itz Contreras MD    Anesthesia Type: MAC ASA Status: 2            Anesthesia Type: MAC    Vitals  Vitals Value Taken Time   /79 05/22/24 1240   Temp     Pulse 61 05/22/24 1241   Resp 16 05/22/24 1240   SpO2 96 % 05/22/24 1241   Vitals shown include unfiled device data.        Post Anesthesia Care and Evaluation    Patient location during evaluation: PACU  Patient participation: complete - patient participated  Level of consciousness: awake and alert  Pain management: adequate    Airway patency: patent  Anesthetic complications: No anesthetic complications    Cardiovascular status: acceptable  Respiratory status: acceptable  Hydration status: acceptable    Comments: --------------------            05/22/24               1240     --------------------   BP:       125/79     Pulse:      62       Resp:       16       SpO2:      96%      --------------------

## 2024-05-22 NOTE — ANESTHESIA PREPROCEDURE EVALUATION
Anesthesia Evaluation     Patient summary reviewed and Nursing notes reviewed                Airway   Mallampati: II  Dental      Pulmonary - negative pulmonary ROS   Cardiovascular     Rhythm: regular  Rate: normal    (+) hypertension, valvular problems/murmurs MVP      Neuro/Psych  (+) dizziness/light headedness, numbness  GI/Hepatic/Renal/Endo    (+) GERD    Musculoskeletal (-) negative ROS    Abdominal    Substance History - negative use     OB/GYN negative ob/gyn ROS         Other                    Anesthesia Plan    ASA 2     MAC     intravenous induction     Anesthetic plan, risks, benefits, and alternatives have been provided, discussed and informed consent has been obtained with: patient.    CODE STATUS:

## 2024-05-22 NOTE — H&P
[facility]  Pre Procedure History & Physical    Chief Complaint:   History colon polyps    Subjective     HPI:   Patient 63-year-old female with history of rosacea, vertigo, hypertension and heartburn presenting for colonoscopy surveillance.  Patient history colon polyps    Past Medical History:   Past Medical History:   Diagnosis Date    GERD (gastroesophageal reflux disease)     Hypertension     Iron deficiency anemia 2018    Pregnancy         Rosacea     Vertigo        Past Surgical History:  Past Surgical History:   Procedure Laterality Date    ABDOMINOPLASTY       SECTION      x 2    COLONOSCOPY N/A 3/12/2018    Procedure: COLONOSCOPY INTO CECUM AND NORMAL TI WITH HOT SNARE POLYPECTOMY;  Surgeon: Lincoln Chino MD;  Location: [facility] ENDOSCOPY;  Service: Gastroenterology    COSMETIC SURGERY  10/2016    Abdominoplasty    LASIK         Family History:  Family History   Problem Relation Age of Onset    Meniere's disease Mother     Dementia Mother     Other Father         Accident    Migraines Sister     Suicidality Brother     Coronary artery disease Brother        Social History:   reports that she has never smoked. She has never used smokeless tobacco. She reports that she does not drink alcohol and does not use drugs.    Medications:   Medications Prior to Admission   Medication Sig Dispense Refill Last Dose    meclizine (ANTIVERT) 12.5 MG tablet Take 1 tablet by mouth 3 (Three) Times a Day As Needed for Dizziness. 30 tablet 0 Past Month    finasteride (PROSCAR) 5 MG tablet    2024    imiquimod (ALDARA) 5 % cream APPLY A THIN LAYER TO THE AFFECTED AREA ON THE CHEEKS AND UPPER LIP ONCE A WEEK FOR 6 MONTHS   More than a month    metroNIDAZOLE (METROGEL) 1 % gel Apply  topically to the appropriate area on face  as directed. 60 g PRN More than a month    omeprazole (PriLOSEC) 20 MG capsule Take 1 capsule by mouth Daily As Needed.   2024     "triamterene-hydrochlorothiazide (MAXZIDE-25) 37.5-25 MG per tablet TAKE 1 TABLET BY MOUTH DAILY 30 tablet 2 5/20/2024       Allergies:  Patient has no known allergies.    ROS:    Pertinent items are noted in HPI     Objective     Blood pressure 126/72, pulse 64, resp. rate 16, height 165.1 cm (65\"), weight 75.9 kg (167 lb 4.8 oz), SpO2 98%.    Physical Exam   Constitutional: Pt is oriented to person, place, and time and well-developed, well-nourished, and in no distress.   Mouth/Throat: Oropharynx is clear and moist.   Neck: Normal range of motion.   Cardiovascular: Normal rate, regular rhythm and normal heart sounds.    Pulmonary/Chest: Effort normal and breath sounds normal.   Abdominal: Soft. Nontender  Skin: Skin is warm and dry.   Psychiatric: Mood, memory, affect and judgment normal.     Assessment & Plan     Diagnosis:  History colon polyps    Anticipated Surgical Procedure:  Colonoscopy    The risks, benefits, and alternatives of this procedure have been discussed with the patient or the responsible party- the patient understands and agrees to proceed.                                                          "

## 2024-05-23 LAB
LAB AP CASE REPORT: NORMAL
PATH REPORT.FINAL DX SPEC: NORMAL
PATH REPORT.GROSS SPEC: NORMAL

## 2024-06-10 ENCOUNTER — TELEPHONE (OUTPATIENT)
Dept: GASTROENTEROLOGY | Facility: CLINIC | Age: 64
End: 2024-06-10
Payer: COMMERCIAL

## 2024-06-10 NOTE — TELEPHONE ENCOUNTER
----- Message from Lincoln Chino sent at 6/9/2024 11:26 PM EDT -----  Polyp benign repeat: 5 years as discussed

## 2024-06-10 NOTE — TELEPHONE ENCOUNTER
Pt reviewed results via [employer].     Sent pt [employer] msg advising of results and recommendations. Advised to call if any questions.     Colonoscopy placed in  and recall for 5/22/29.

## 2024-06-17 RX ORDER — TRIAMTERENE AND HYDROCHLOROTHIAZIDE 37.5; 25 MG/1; MG/1
1 TABLET ORAL DAILY
Qty: 30 TABLET | Refills: 2 | OUTPATIENT
Start: 2024-06-17

## 2024-06-21 ENCOUNTER — PATIENT MESSAGE (OUTPATIENT)
Dept: INTERNAL MEDICINE | Facility: CLINIC | Age: 64
End: 2024-06-21
Payer: COMMERCIAL

## 2024-06-21 RX ORDER — TRIAMTERENE AND HYDROCHLOROTHIAZIDE 37.5; 25 MG/1; MG/1
1 TABLET ORAL DAILY
Qty: 30 TABLET | Refills: 2 | OUTPATIENT
Start: 2024-06-21

## 2024-07-01 PROBLEM — Z86.79 HISTORY OF VARICOSE VEINS: Status: ACTIVE | Noted: 2024-07-01

## 2024-07-03 ENCOUNTER — OFFICE VISIT (OUTPATIENT)
Age: 64
End: 2024-07-03
Payer: COMMERCIAL

## 2024-07-03 VITALS
SYSTOLIC BLOOD PRESSURE: 122 MMHG | HEIGHT: 65 IN | WEIGHT: 167 LBS | BODY MASS INDEX: 27.82 KG/M2 | DIASTOLIC BLOOD PRESSURE: 80 MMHG

## 2024-07-03 DIAGNOSIS — I83.813 VARICOSE VEINS OF BOTH LOWER EXTREMITIES WITH PAIN: Primary | Chronic | ICD-10-CM

## 2024-07-03 DIAGNOSIS — Z86.79 HISTORY OF VARICOSE VEINS: ICD-10-CM

## 2024-07-03 DIAGNOSIS — I87.2 VENOUS (PERIPHERAL) INSUFFICIENCY: ICD-10-CM

## 2024-07-03 NOTE — PROGRESS NOTES
Patient Name: Carlie Brennan    MRN: [id number] Encounter Date: 2024      Consulting Service: Vascular Surgery    Referring Provider: No ref. provider found       CHIEF COMPLAINT:  Chief Complaint   Patient presents with    New Patient      ESR patient, sclero consult.        Subjective    HPI: Carlie Brennan is a 64 y.o. female is being seen for evaluation/management of venous insufficiency bilaterally.  Workup is already been started and the patient has actually been wearing compression for almost a year.  Plans for bilateral venous mapping were made but somehow the study did not get done and as such we are going to get that ordered now.    PAST MEDICAL HISTORY:   Past Medical History:   Diagnosis Date    GERD (gastroesophageal reflux disease)     History of varicose veins     BLE with pain    Hypertension     Iron deficiency anemia 2018    Localized edema     2023    Meniere's disease, unspecified ear     Pregnancy         Rosacea     2023    Vertigo       PAST SURGICAL HISTORY:   Past Surgical History:   Procedure Laterality Date    ABDOMINOPLASTY       SECTION      x 2    COLONOSCOPY N/A 3/12/2018    Procedure: COLONOSCOPY INTO CECUM AND NORMAL TI WITH HOT SNARE POLYPECTOMY;  Surgeon: Lincoln Chino MD;  Location: [facility] ENDOSCOPY;  Service: Gastroenterology    COLONOSCOPY N/A 2024    Procedure: COLONOSCOPY INTO CECUM AND TI WITH POLYPECTOMIES (COLD BX/COLD SNARE);  Surgeon: Lincoln Chino MD;  Location: [facility] ENDOSCOPY;  Service: Gastroenterology;  Laterality: N/A;  PRE: HX OF POLYPS  POST: POLYPS, DIVERTICULOSIS, h emorrhoids    COSMETIC SURGERY  10/2016    Abdominoplasty    LASIK        FAMILY HISTORY:   Family History   Problem Relation Age of Onset    Meniere's disease Mother     Dementia Mother     Other Father         Accident    Migraines Sister     Suicidality Brother     Coronary artery disease Brother       SOCIAL HISTORY:   Social History  "    Tobacco Use    Smoking status: Never    Smokeless tobacco: Never   Vaping Use    Vaping status: Never Used   Substance Use Topics    Alcohol use: No     Comment: socially    Drug use: No      MEDICATIONS:   Current Outpatient Medications on File Prior to Visit   Medication Sig Dispense Refill    finasteride (PROSCAR) 5 MG tablet       imiquimod (ALDARA) 5 % cream APPLY A THIN LAYER TO THE AFFECTED AREA ON THE CHEEKS AND UPPER LIP ONCE A WEEK FOR 6 MONTHS      meclizine (ANTIVERT) 12.5 MG tablet Take 1 tablet by mouth 3 (Three) Times a Day As Needed for Dizziness. 30 tablet 0    metroNIDAZOLE (METROGEL) 1 % gel Apply  topically to the appropriate area on face  as directed. 60 g PRN    omeprazole (PriLOSEC) 20 MG capsule Take 1 capsule by mouth Daily As Needed.      triamterene-hydrochlorothiazide (MAXZIDE-25) 37.5-25 MG per tablet TAKE 1 TABLET BY MOUTH DAILY 30 tablet 2     No current facility-administered medications on file prior to visit.       ALLERGIES: Patient has no known allergies.       Objective   Vitals:    07/03/24 1442   BP: 122/80   Weight: 75.8 kg (167 lb)   Height: 165.1 cm (65\")     Body mass index is 27.79 kg/m².          PHYSICAL EXAM:   Physical Exam  Constitutional:       Appearance: Normal appearance.   HENT:      Head: Normocephalic and atraumatic.      Nose: Nose normal.   Eyes:      Extraocular Movements: Extraocular movements intact.      Pupils: Pupils are equal, round, and reactive to light.   Cardiovascular:      Rate and Rhythm: Normal rate.      Pulses: Normal pulses.      Heart sounds: Normal heart sounds.      Comments: Bilateral thigh truncal varicosities left slightly worse than right with some inflammation and discomfort.  Moderate distal veins also noted.  Pulmonary:      Effort: Pulmonary effort is normal.      Breath sounds: Normal breath sounds.   Abdominal:      General: Abdomen is flat. Bowel sounds are normal.      Palpations: Abdomen is soft.   Musculoskeletal:         " General: Normal range of motion.      Cervical back: Normal range of motion and neck supple.      Right lower le+ Edema present.      Left lower le+ Edema present.   Skin:     General: Skin is warm and dry.   Neurological:      General: No focal deficit present.      Mental Status: She is alert and oriented to person, place, and time. Mental status is at baseline.   Psychiatric:         Mood and Affect: Mood normal.         Thought Content: Thought content normal.          Result Review   LABS:      Results Review:       I reviewed the patient's new clinical results.    The following radiologic or non-invasive studies have been reviewed by me: None  No radiology results for the last 30 days.                ASSESSMENT/PLAN:   Diagnoses and all orders for this visit:    1. Varicose veins of both lower extremities with pain (Primary)  -     Venous w Reflux Lower Extremity - Bilateral CAR; Future    2. History of varicose veins    3. Venous (peripheral) insufficiency       64 y.o. female with bilateral lower extremity venous insufficiency with varicosities with inflammation and discomfort.  She has edema without skin injury at this time.  She has been compliant with her medical grade compression stockings since August of last year she elevates and uses nonsteroidals as well.  At this point in time she is in need of a bilateral venous mapping and we will try to get a class I bilateral study done ASAP.  We once done we will go over the results and hopefully we will offer her treatment with minimally invasive approach intravenously.  Will let you know how things look when she is in.    I discussed the plan with the patient who is agreeable to the plan of care at this point. Thank you for this consult.   Follow Up  Return in about 1 month (around 8/3/2024).    Frank Ferrara MD   24

## 2024-07-31 ENCOUNTER — HOSPITAL ENCOUNTER (OUTPATIENT)
Facility: HOSPITAL | Age: 64
Discharge: HOME | End: 2024-07-31
Admitting: NURSE PRACTITIONER
Payer: COMMERCIAL

## 2024-07-31 DIAGNOSIS — Z78.0 POSTMENOPAUSAL: ICD-10-CM

## 2024-07-31 PROCEDURE — 77080 DXA BONE DENSITY AXIAL: CPT

## 2024-08-23 ENCOUNTER — OFFICE VISIT (OUTPATIENT)
Dept: INTERNAL MEDICINE | Facility: CLINIC | Age: 64
End: 2024-08-23
Payer: COMMERCIAL

## 2024-08-23 VITALS
WEIGHT: 173 LBS | HEIGHT: 65 IN | OXYGEN SATURATION: 100 % | DIASTOLIC BLOOD PRESSURE: 82 MMHG | HEART RATE: 61 BPM | SYSTOLIC BLOOD PRESSURE: 118 MMHG | TEMPERATURE: 97.3 F | BODY MASS INDEX: 28.82 KG/M2

## 2024-08-23 DIAGNOSIS — M54.50 ACUTE BILATERAL LOW BACK PAIN WITHOUT SCIATICA: ICD-10-CM

## 2024-08-23 DIAGNOSIS — J03.90 TONSILLITIS: ICD-10-CM

## 2024-08-23 DIAGNOSIS — J06.9 UPPER RESPIRATORY TRACT INFECTION, UNSPECIFIED TYPE: ICD-10-CM

## 2024-08-23 DIAGNOSIS — R05.1 ACUTE COUGH: Primary | ICD-10-CM

## 2024-08-23 DIAGNOSIS — R50.9 LOW GRADE FEVER: ICD-10-CM

## 2024-08-23 LAB
BILIRUB BLD-MCNC: NEGATIVE MG/DL
CLARITY, POC: CLEAR
COLOR UR: YELLOW
EXPIRATION DATE: NORMAL
EXPIRATION DATE: NORMAL
FLUAV AG UPPER RESP QL IA.RAPID: NOT DETECTED
FLUBV AG UPPER RESP QL IA.RAPID: NOT DETECTED
GLUCOSE UR STRIP-MCNC: NEGATIVE MG/DL
INTERNAL CONTROL: NORMAL
KETONES UR QL: NEGATIVE
LEUKOCYTE EST, POC: NEGATIVE
Lab: NORMAL
Lab: NORMAL
NITRITE UR-MCNC: NEGATIVE MG/ML
PH UR: 7.5 [PH] (ref 5–8)
PROT UR STRIP-MCNC: NEGATIVE MG/DL
RBC # UR STRIP: NEGATIVE /UL
SARS-COV-2 AG UPPER RESP QL IA.RAPID: NOT DETECTED
SP GR UR: 1.01 (ref 1–1.03)
UROBILINOGEN UR QL: NORMAL

## 2024-08-23 PROCEDURE — 81003 URINALYSIS AUTO W/O SCOPE: CPT

## 2024-08-23 PROCEDURE — 99213 OFFICE O/P EST LOW 20 MIN: CPT

## 2024-08-23 PROCEDURE — 87428 SARSCOV & INF VIR A&B AG IA: CPT

## 2024-08-23 RX ORDER — AZITHROMYCIN 250 MG/1
TABLET, FILM COATED ORAL
Qty: 6 TABLET | Refills: 0 | Status: SHIPPED | OUTPATIENT
Start: 2024-08-23

## 2024-08-23 RX ORDER — CYCLOBENZAPRINE HCL 5 MG
5 TABLET ORAL 2 TIMES DAILY PRN
Qty: 30 TABLET | Refills: 0 | Status: SHIPPED | OUTPATIENT
Start: 2024-08-23

## 2024-08-23 NOTE — PATIENT INSTRUCTIONS
Acute Back Pain, Adult  Acute back pain is sudden and usually short-lived. It is often caused by an injury to the muscles and tissues in the back. The injury may result from:  A muscle, tendon, or ligament getting overstretched or torn. Ligaments are tissues that connect bones to each other. Lifting something improperly can cause a back strain.  Wear and tear (degeneration) of the spinal disks. Spinal disks are circular tissue that provide cushioning between the bones of the spine (vertebrae).  Twisting motions, such as while playing sports or doing yard work.  A hit to the back.  Arthritis.  You may have a physical exam, lab tests, and imaging tests to find the cause of your pain. Acute back pain usually goes away with rest and home care.  Follow these instructions at home:  Managing pain, stiffness, and swelling  Take over-the-counter and prescription medicines only as told by your health care provider. Treatment may include medicines for pain and inflammation that are taken by mouth or applied to the skin, or muscle relaxants.  Your health care provider may recommend applying ice during the first 24-48 hours after your pain starts. To do this:  Put ice in a plastic bag.  Place a towel between your skin and the bag.  Leave the ice on for 20 minutes, 2-3 times a day.  Remove the ice if your skin turns bright red. This is very important. If you cannot feel pain, heat, or cold, you have a greater risk of damage to the area.  If directed, apply heat to the affected area as often as told by your health care provider. Use the heat source that your health care provider recommends, such as a moist heat pack or a heating pad.  Place a towel between your skin and the heat source.  Leave the heat on for 20-30 minutes.  Remove the heat if your skin turns bright red. This is especially important if you are unable to feel pain, heat, or cold. You have a greater risk of getting burned.  Activity    Do not stay in bed. Staying in  bed for more than 1-2 days can delay your recovery.  Sit up and stand up straight. Avoid leaning forward when you sit or hunching over when you stand.  If you work at a desk, sit close to it so you do not need to lean over. Keep your chin tucked in. Keep your neck drawn back, and keep your elbows bent at a 90-degree angle (right angle).  Sit high and close to the steering wheel when you drive. Add lower back (lumbar) support to your car seat, if needed.  Take short walks on even surfaces as soon as you are able. Try to increase the length of time you walk each day.  Do not sit, drive, or  one place for more than 30 minutes at a time. Sitting or standing for long periods of time can put stress on your back.  Do not drive or use heavy machinery while taking prescription pain medicine.  Use proper lifting techniques. When you bend and lift, use positions that put less stress on your back:  Bend your knees.  Keep the load close to your body.  Avoid twisting.  Exercise regularly as told by your health care provider. Exercising helps your back heal faster and helps prevent back injuries by keeping muscles strong and flexible.  Work with a physical therapist to make a safe exercise program, as recommended by your health care provider. Do any exercises as told by your physical therapist.  Lifestyle  Maintain a healthy weight. Extra weight puts stress on your back and makes it difficult to have good posture.  Avoid activities or situations that make you feel anxious or stressed. Stress and anxiety increase muscle tension and can make back pain worse. Learn ways to manage anxiety and stress, such as through exercise.  General instructions  Sleep on a firm mattress in a comfortable position. Try lying on your side with your knees slightly bent. If you lie on your back, put a pillow under your knees.  Keep your head and neck in a straight line with your spine (neutral position) when using electronic equipment like  smartphones or pads. To do this:  Raise your smartphone or pad to look at it instead of bending your head or neck to look down.  Put the smartphone or pad at the level of your face while looking at the screen.  Follow your treatment plan as told by your health care provider. This may include:  Cognitive or behavioral therapy.  Acupuncture or massage therapy.  Meditation or yoga.  Contact a health care provider if:  You have pain that is not relieved with rest or medicine.  You have increasing pain going down into your legs or buttocks.  Your pain does not improve after 2 weeks.  You have pain at night.  You lose weight without trying.  You have a fever or chills.  You develop nausea or vomiting.  You develop abdominal pain.  Get help right away if:  You develop new bowel or bladder control problems.  You have unusual weakness or numbness in your arms or legs.  You feel faint.  These symptoms may represent a serious problem that is an emergency. Do not wait to see if the symptoms will go away. Get medical help right away. Call your local emergency services (911 in the [place] Do not drive yourself to the hospital.  Summary  Acute back pain is sudden and usually short-lived.  Use proper lifting techniques. When you bend and lift, use positions that put less stress on your back.  Take over-the-counter and prescription medicines only as told by your health care provider, and apply heat or ice as told.  This information is not intended to replace advice given to you by your health care provider. Make sure you discuss any questions you have with your health care provider.  Document Revised: 03/11/2022 Document Reviewed: 03/11/2022  Elsevier Patient Education © 2024 [employer]      Tonsillitis    Tonsillitis is an infection of the throat. Tonsils are tissues in the back of your throat. This infection causes the tonsils to become red, tender, and swollen.  What are the causes?  Tonsillitis is caused by germs (bacteria or  a virus). This condition can also occur when pieces of food and bacteria build up around the tonsils.  Tonsillitis that is caused by germs can spread from person to person.  What are the signs or symptoms?  A sore throat.  Trouble swallowing.  White patches on the tonsils.  Swollen tonsils.  Fever.  Headache.  Tiredness.  Not feeling hungry.  Snoring during sleep when you did not snore before.  Foul-smelling, yellowish-white pieces of material that you cough up or spit out. These can cause bad breath.  How is this treated?  Medicines. These can be given to treat pain, swelling, or fever. They can also be given to kill bacteria.  Surgery to take out the tonsils. This is done if you have very bad infections that do not go away.  Follow these instructions at home:  Medicines  Take over-the-counter and prescription medicines only as told by your doctor.  If you were prescribed an antibiotic medicine, take it as told by your doctor. Do not stop taking the antibiotic even if you start to feel better.  Eating and drinking  Drink enough fluid to keep your pee (urine) pale yellow.  While your throat is sore, eat soft or liquid foods, such as:  Soup.  Sherbet.  Soft, warm cereals, such as oatmeal or hot wheat cereal.  Drink warm fluids.  Eat frozen ice pops.  General instructions  Rest as much as you can, and get plenty of sleep.  Rinse your mouth often with salt water.  To make salt water, dissolve ½-1 tsp (3-6 g) of salt in 1 cup (237 mL) of warm water.  Do not swallow the salt water.  Wash your hands often with soap and water for at least 20 seconds. If there is no soap and water, use hand .  Do not share cups, bottles, or other utensils until your symptoms are gone.  Do not smoke or use any products that contain nicotine or tobacco. If you need help quitting, ask your doctor.  Keep all follow-up visits.  Contact a doctor if:  You have large, tender lumps in your neck that are new.  You have a fever that does not  go away after 2-3 days.  You have a rash.  You cough up green, yellow-brown, or bloody fluid.  You cannot swallow liquids or food for 24 hours.  Only one of your tonsils is swollen.  Get help right away if:  You have any new symptoms such as:  Vomiting.  Very bad headache.  Stiff neck.  Chest pain.  Trouble breathing or swallowing.  You have very bad throat pain, and you also have drooling or voice changes.  You have very bad pain that is not helped by medicine.  You cannot fully open your mouth.  You have redness, swelling, or very bad pain anywhere in your neck.  Summary  Tonsillitis is an infection of the throat. It causes your tonsils to be red, tender, and swollen.  While your throat is sore, eat soft or liquid foods.  Rinse your mouth often with salt water.  Do not share cups, bottles, or other utensils until your symptoms are gone.  This information is not intended to replace advice given to you by your health care provider. Make sure you discuss any questions you have with your health care provider.  Document Revised: 05/12/2022 Document Reviewed: 05/12/2022  Elsevier Patient Education © 2024 [employer]

## 2024-10-22 ENCOUNTER — OFFICE VISIT (OUTPATIENT)
Dept: INTERNAL MEDICINE | Facility: CLINIC | Age: 64
End: 2024-10-22
Payer: COMMERCIAL

## 2024-10-22 VITALS
DIASTOLIC BLOOD PRESSURE: 80 MMHG | SYSTOLIC BLOOD PRESSURE: 122 MMHG | HEART RATE: 64 BPM | HEIGHT: 65 IN | OXYGEN SATURATION: 99 % | BODY MASS INDEX: 28.36 KG/M2 | WEIGHT: 170.2 LBS

## 2024-10-22 DIAGNOSIS — K21.9 GASTROESOPHAGEAL REFLUX DISEASE WITHOUT ESOPHAGITIS: Chronic | ICD-10-CM

## 2024-10-22 DIAGNOSIS — Z23 NEED FOR COVID-19 VACCINE: ICD-10-CM

## 2024-10-22 DIAGNOSIS — F51.01 PRIMARY INSOMNIA: ICD-10-CM

## 2024-10-22 DIAGNOSIS — R25.2 LEG CRAMPS: ICD-10-CM

## 2024-10-22 DIAGNOSIS — Z00.00 PHYSICAL EXAM: Primary | ICD-10-CM

## 2024-10-22 DIAGNOSIS — Z23 NEED FOR INFLUENZA VACCINATION: ICD-10-CM

## 2024-10-22 PROBLEM — Z78.0 POSTMENOPAUSAL: Status: RESOLVED | Noted: 2023-10-13 | Resolved: 2024-10-22

## 2024-10-22 LAB
ALBUMIN SERPL-MCNC: 4.2 G/DL (ref 3.5–5.2)
ALBUMIN/GLOB SERPL: 1.4 G/DL
ALP SERPL-CCNC: 81 U/L (ref 39–117)
ALT SERPL-CCNC: 16 U/L (ref 1–33)
AST SERPL-CCNC: 20 U/L (ref 1–32)
BILIRUB SERPL-MCNC: 0.2 MG/DL (ref 0–1.2)
BUN SERPL-MCNC: 16 MG/DL (ref 8–23)
BUN/CREAT SERPL: 18.6 (ref 7–25)
CALCIUM SERPL-MCNC: 9.3 MG/DL (ref 8.6–10.5)
CHLORIDE SERPL-SCNC: 102 MMOL/L (ref 98–107)
CHOLEST SERPL-MCNC: 214 MG/DL (ref 0–200)
CO2 SERPL-SCNC: 27.5 MMOL/L (ref 22–29)
CREAT SERPL-MCNC: 0.86 MG/DL (ref 0.57–1)
EGFRCR SERPLBLD CKD-EPI 2021: 76.5 ML/MIN/1.73
ERYTHROCYTE [DISTWIDTH] IN BLOOD BY AUTOMATED COUNT: 12.2 % (ref 12.3–15.4)
GLOBULIN SER CALC-MCNC: 2.9 GM/DL
GLUCOSE SERPL-MCNC: 86 MG/DL (ref 65–99)
HCT VFR BLD AUTO: 42.7 % (ref 34–46.6)
HDLC SERPL-MCNC: 53 MG/DL (ref 40–60)
HGB BLD-MCNC: 14.8 G/DL (ref 12–15.9)
LDLC SERPL CALC-MCNC: 142 MG/DL (ref 0–100)
MAGNESIUM SERPL-MCNC: 2.2 MG/DL (ref 1.6–2.4)
MCH RBC QN AUTO: 31.5 PG (ref 26.6–33)
MCHC RBC AUTO-ENTMCNC: 34.7 G/DL (ref 31.5–35.7)
MCV RBC AUTO: 90.9 FL (ref 79–97)
PLATELET # BLD AUTO: 272 10*3/MM3 (ref 140–450)
POTASSIUM SERPL-SCNC: 3.9 MMOL/L (ref 3.5–5.2)
PROT SERPL-MCNC: 7.1 G/DL (ref 6–8.5)
RBC # BLD AUTO: 4.7 10*6/MM3 (ref 3.77–5.28)
SODIUM SERPL-SCNC: 141 MMOL/L (ref 136–145)
TRIGL SERPL-MCNC: 104 MG/DL (ref 0–150)
TSH SERPL DL<=0.005 MIU/L-ACNC: 1.86 UIU/ML (ref 0.27–4.2)
VLDLC SERPL CALC-MCNC: 19 MG/DL (ref 5–40)
WBC # BLD AUTO: 12.02 10*3/MM3 (ref 3.4–10.8)

## 2024-10-22 PROCEDURE — 91320 SARSCV2 VAC 30MCG TRS-SUC IM: CPT | Performed by: NURSE PRACTITIONER

## 2024-10-22 PROCEDURE — 90471 IMMUNIZATION ADMIN: CPT | Performed by: NURSE PRACTITIONER

## 2024-10-22 PROCEDURE — 90480 ADMN SARSCOV2 VAC 1/ONLY CMP: CPT | Performed by: NURSE PRACTITIONER

## 2024-10-22 PROCEDURE — 99396 PREV VISIT EST AGE 40-64: CPT | Performed by: NURSE PRACTITIONER

## 2024-10-22 PROCEDURE — 90656 IIV3 VACC NO PRSV 0.5 ML IM: CPT | Performed by: NURSE PRACTITIONER

## 2024-10-22 RX ORDER — PREDNISONE 10 MG/1
TABLET ORAL
COMMUNITY
Start: 2024-10-19 | End: 2024-10-22

## 2024-10-22 NOTE — PROGRESS NOTES
Subjective   Carlie Brennan is a 64 y.o. female who is here for a physical exam.    History of Present Illness   History of Present Illness    She experienced chest pain a few months ago, which led her to seek emergency care. The pain was not new to her, but it was accompanied by shortness of breath during conversation. She did not believe it was a heart attack as she had exercised that morning. She suspects it might be an esophageal spasm. Despite the EKG showing normal sinus rhythm, she continues to experience these episodes, which are not stress-related. The episodes are not painful but cause her to catch her breath, particularly when talking. They are irregular and occur in the center of her chest, lasting only a few seconds. Denies palpitations.    She reports poor sleep quality, often waking up between 2:00 AM and 4:00 AM since menopause. She has tried melatonin without success and occasionally uses Tylenol PM or NyQuil to aid sleep.    She also experiences muscle cramps when reaching for objects. She drinks plenty of water and takes triamterene three times a week for swelling. She does not have any neck or lower back issues.    She takes Prilosec for heartburn and indigestion which is effective. Her bowel movements are normal, and she reports no blood in the stool or constipation.    She has a known allergy to poison ivy, which she has been exposed to throughout her life. She recently had an allergic reaction after working in her yard, which resulted in blisters. The reaction was severe enough to disrupt her sleep. She sought care at an urgent care center and was prescribed prednisone which improved her symptoms.     She sees her eye doctor twice a year. She has borderline glaucoma and is getting cataracts which are being monitored.    ALLERGIES  She is allergic to POISON IVY.      Past Medical History:   Diagnosis Date    GERD (gastroesophageal reflux disease)     History of varicose veins     BLE with pain     Hypertension     Iron deficiency anemia 2018    Localized edema     2023    Meniere's disease, unspecified ear     Pregnancy         Rosacea     2023    Vertigo          Current Outpatient Medications:     finasteride (PROSCAR) 5 MG tablet, , Disp: , Rfl:     imiquimod (ALDARA) 5 % cream, APPLY A THIN LAYER TO THE AFFECTED AREA ON THE CHEEKS AND UPPER LIP ONCE A WEEK FOR 6 MONTHS, Disp: , Rfl:     meclizine (ANTIVERT) 12.5 MG tablet, Take 1 tablet by mouth 3 (Three) Times a Day As Needed for Dizziness., Disp: 30 tablet, Rfl: 0    omeprazole (PriLOSEC) 20 MG capsule, Take 1 capsule by mouth Daily As Needed., Disp: , Rfl:     triamterene-hydrochlorothiazide (MAXZIDE-25) 37.5-25 MG per tablet, TAKE 1 TABLET BY MOUTH DAILY, Disp: 30 tablet, Rfl: 2    No Known Allergies    Review of Systems   Constitutional:  Negative for activity change, appetite change, chills, diaphoresis, fatigue, fever and unexpected weight change.   HENT:  Positive for congestion and postnasal drip. Negative for dental problem, drooling, ear discharge, ear pain, facial swelling, hearing loss, mouth sores, nosebleeds, rhinorrhea, sinus pressure, sore throat, tinnitus and trouble swallowing.    Eyes:  Negative for photophobia, pain, discharge, redness, itching and visual disturbance.   Respiratory:  Negative for apnea, cough, choking, chest tightness, shortness of breath and wheezing.    Cardiovascular:  Negative for chest pain, palpitations and leg swelling.        No orthopnea, PND, COLON   Gastrointestinal:  Negative for abdominal pain, blood in stool, constipation, diarrhea, nausea and vomiting.   Endocrine: Negative for cold intolerance, heat intolerance, polydipsia and polyuria.   Genitourinary:  Negative for decreased urine volume, dysuria, enuresis, flank pain, frequency, hematuria and urgency.   Musculoskeletal:  Positive for arthralgias. Negative for back pain, gait problem, joint swelling, myalgias, neck pain and neck  "stiffness.   Skin:  Positive for rash. Negative for color change.        No hair changes, no nail changes   Allergic/Immunologic: Negative for environmental allergies, food allergies and immunocompromised state.   Neurological:  Negative for dizziness, tremors, seizures, syncope, speech difficulty, weakness, light-headedness, numbness and headaches.   Hematological:  Negative for adenopathy. Does not bruise/bleed easily.   Psychiatric/Behavioral:  Positive for sleep disturbance. Negative for agitation, confusion, decreased concentration, dysphoric mood and suicidal ideas. The patient is not nervous/anxious.        Objective   Vitals:    10/22/24 0759   BP: 122/80   BP Location: Left arm   Patient Position: Sitting   Cuff Size: Adult   Pulse: 64   SpO2: 99%   Weight: 77.2 kg (170 lb 3.2 oz)   Height: 165.1 cm (65\")     Physical Exam  Constitutional:       General: She is not in acute distress.     Appearance: Normal appearance. She is not diaphoretic.   HENT:      Head: Normocephalic and atraumatic.      Right Ear: Tympanic membrane, ear canal and external ear normal.      Left Ear: Tympanic membrane, ear canal and external ear normal.      Nose: Nose normal. No rhinorrhea.      Mouth/Throat:      Mouth: Mucous membranes are moist.      Pharynx: Oropharynx is clear.   Eyes:      General:         Right eye: No discharge.         Left eye: No discharge.      Conjunctiva/sclera: Conjunctivae normal.   Cardiovascular:      Rate and Rhythm: Normal rate and regular rhythm.      Pulses: Normal pulses.      Heart sounds: Normal heart sounds.   Pulmonary:      Effort: Pulmonary effort is normal.      Breath sounds: Normal breath sounds.   Abdominal:      General: Bowel sounds are normal.      Tenderness: There is no abdominal tenderness.   Musculoskeletal:         General: No swelling or tenderness.      Cervical back: Normal range of motion.   Skin:     General: Skin is warm and dry.      Findings: Rash present.      " Comments: Erythematous, maculopapular and vesicular rash on bilateral forearms   Neurological:      General: No focal deficit present.      Mental Status: She is alert and oriented to person, place, and time.   Psychiatric:         Mood and Affect: Mood normal.         Behavior: Behavior normal.         Judgment: Judgment normal.       Physical Exam  Vital Signs  Blood pressure reading is 118/82.    Results  Laboratory Studies  Thyroid levels were normal. Magnesium levels were normal. Cardiac enzymes were normal. Blood sugar levels were normal.    Testing  EKG showed normal sinus rhythm.       Assessment & Plan   Diagnoses and all orders for this visit:    1. Physical exam (Primary)  -     CBC (No Diff)  -     Comprehensive Metabolic Panel  -     Lipid Panel  -     TSH    2. Leg cramps  Assessment & Plan:  The muscle cramps may be due to dehydration or a side effect of triamterene. She was advised to discontinue triamterene to see if it alleviates her cramps. Regular stretching exercises were recommended. A magnesium level check will be conducted.    Orders:  -     Magnesium    3. Primary insomnia  Assessment & Plan:  She experiences difficulty staying asleep, likely related to menopause. Melatonin was suggested as a gentle option. She was also informed about the potential long-term effects of using Benadryl, which is an ingredient in Tylenol PM, on memory. She was advised to use it sparingly.      4. Need for influenza vaccination  -     Fluzone >6mos    5. Need for COVID-19 vaccine  -     COVID-19 (Pfizer) 12yrs+ (COMIRNATY)    6. Gastroesophageal reflux disease without esophagitis  Assessment & Plan:  She takes a [employer] brand of Prilosec daily, which controls her symptoms well. She was advised to continue this medication and avoid trigger foods.        Assessment & Plan    Esophageal Spasms.  The chest pain is likely due to esophageal spasms, as cardiac causes were ruled out during the ER visit with a normal EKG  and lab results. She was advised to monitor her diet for potential esophageal irritants such as red wine and very hot or cold foods. Stress management was also recommended as stress can exacerbate esophageal spasms. If symptoms persist, a calcium channel blocker may be considered.      Health Maintenance.  She will receive her influenza and COVID-19 vaccines today. She was also advised to consider the RSV vaccine, especially due to exposure to young children.      Risk Assessment:  Family History   Problem Relation Age of Onset    Meniere's disease Mother     Dementia Mother     Other Father         Accident    Migraines Sister     Suicidality Brother     Coronary artery disease Brother      Her Body mass index is 28.32 kg/m². She tries to remain active and follow a low-fat, low-cholesterol diet.    Prevention:  Health Maintenance   Topic Date Due    ANNUAL PHYSICAL  10/13/2024    BMI FOLLOWUP  02/12/2025    MAMMOGRAM  10/16/2025    LIPID PANEL  10/22/2025    TDAP/TD VACCINES (2 - Td or Tdap) 03/29/2029    COLORECTAL CANCER SCREENING  05/22/2029    DXA SCAN  08/06/2029    HEPATITIS C SCREENING  Completed    COVID-19 Vaccine  Completed    INFLUENZA VACCINE  Completed    ZOSTER VACCINE  Completed    Pneumococcal Vaccine 0-64  Aged Out    PAP SMEAR  Discontinued       Discussed healthy lifestyle choices such as maintaining a balanced diet low in carbohydrates and limiting caffeine and alcohol intake.  Recommended routine exercise for bone strength and cardiovascular health.         Patient or patient representative verbalized consent for the use of Ambient Listening during the visit with  RAY Mobley for chart documentation. 10/27/2024  15:56 EDT

## 2024-10-27 PROBLEM — F51.01 PRIMARY INSOMNIA: Status: ACTIVE | Noted: 2024-10-27

## 2024-10-27 PROBLEM — R25.2 LEG CRAMPS: Status: ACTIVE | Noted: 2024-10-27

## 2024-10-27 PROBLEM — F51.01 PRIMARY INSOMNIA: Chronic | Status: ACTIVE | Noted: 2024-10-27

## 2024-10-27 NOTE — ASSESSMENT & PLAN NOTE
The muscle cramps may be due to dehydration or a side effect of triamterene. She was advised to discontinue triamterene to see if it alleviates her cramps. Regular stretching exercises were recommended. A magnesium level check will be conducted.

## 2024-10-27 NOTE — ASSESSMENT & PLAN NOTE
She takes a [employer] brand of Prilosec daily, which controls her symptoms well. She was advised to continue this medication and avoid trigger foods.

## 2024-10-27 NOTE — ASSESSMENT & PLAN NOTE
She experiences difficulty staying asleep, likely related to menopause. Melatonin was suggested as a gentle option. She was also informed about the potential long-term effects of using Benadryl, which is an ingredient in Tylenol PM, on memory. She was advised to use it sparingly.

## 2024-11-11 ENCOUNTER — HOSPITAL ENCOUNTER (OUTPATIENT)
Facility: HOSPITAL | Age: 64
Discharge: HOME | End: 2024-11-11
Admitting: SURGERY
Payer: COMMERCIAL

## 2024-11-11 DIAGNOSIS — I83.813 VARICOSE VEINS OF BOTH LOWER EXTREMITIES WITH PAIN: Chronic | ICD-10-CM

## 2024-11-11 PROCEDURE — 93970 EXTREMITY STUDY: CPT

## 2024-11-13 ENCOUNTER — OFFICE VISIT (OUTPATIENT)
Age: 64
End: 2024-11-13
Payer: COMMERCIAL

## 2024-11-13 VITALS
WEIGHT: 170 LBS | HEIGHT: 65 IN | DIASTOLIC BLOOD PRESSURE: 76 MMHG | SYSTOLIC BLOOD PRESSURE: 128 MMHG | BODY MASS INDEX: 28.32 KG/M2

## 2024-11-13 DIAGNOSIS — Z86.79 HISTORY OF VARICOSE VEINS: Primary | ICD-10-CM

## 2024-11-13 DIAGNOSIS — I87.2 VENOUS (PERIPHERAL) INSUFFICIENCY: ICD-10-CM

## 2024-11-13 DIAGNOSIS — I83.813 VARICOSE VEINS OF BOTH LOWER EXTREMITIES WITH PAIN: Chronic | ICD-10-CM

## 2024-11-13 NOTE — PROGRESS NOTES
Patient Name: Carlie Brennan    MRN: [id number] Encounter Date: 2024      Consulting Service: Vascular Surgery    Referring Provider: No ref. provider found       CHIEF COMPLAINT:  Chief Complaint   Patient presents with    Follow-up     Bilateral class 1 vein mapping.        Subjective    HPI: Carlie Brennan is a 64 y.o. female is being seen for evaluation/management of follow up for vein mapping of the  lower extremity.   Patient has been compliant with medical grade compression stocking use as well as elevation.   Despite best medical therapy symptoms persist.  At this point in time I discussed with the patient the options for intervention versus continued medical therapy.  Based on current anatomy and covered endovenous options I have recommended EVLA.    While the patient was then I discussed in detail at length the procedure itself as well as the risk benefits and complications thereof.  Risks include but are not limited to bleeding, infection, nerve injury, skin injury, failure to clear the veins, failure to clear the pain, deep vein thrombosis and associated pulmonary emboli. For endovenous laser heat related injury was discussed.  Patient understands and will schedule at their convenience.      PAST MEDICAL HISTORY:   Past Medical History:   Diagnosis Date    GERD (gastroesophageal reflux disease)     History of varicose veins     BLE with pain    Hypertension     Iron deficiency anemia 2018    Localized edema     2023    Meniere's disease, unspecified ear     Pregnancy         Rosacea     2023    Vertigo       PAST SURGICAL HISTORY:   Past Surgical History:   Procedure Laterality Date    ABDOMINOPLASTY       SECTION      x 2    COLONOSCOPY N/A 3/12/2018    Procedure: COLONOSCOPY INTO CECUM AND NORMAL TI WITH HOT SNARE POLYPECTOMY;  Surgeon: Lincoln Chino MD;  Location: [facility] ENDOSCOPY;  Service: Gastroenterology    COLONOSCOPY N/A 2024    Procedure:  "COLONOSCOPY INTO CECUM AND TI WITH POLYPECTOMIES (COLD BX/COLD SNARE);  Surgeon: Lincoln Chino MD;  Location: [facility] ENDOSCOPY;  Service: Gastroenterology;  Laterality: N/A;  PRE: HX OF POLYPS  POST: POLYPS, DIVERTICULOSIS, h emorrhoids    COSMETIC SURGERY  10/2016    Abdominoplasty    LASIK        FAMILY HISTORY:   Family History   Problem Relation Age of Onset    Meniere's disease Mother     Dementia Mother     Other Father         Accident    Migraines Sister     Suicidality Brother     Coronary artery disease Brother       SOCIAL HISTORY:   Social History     Tobacco Use    Smoking status: Never    Smokeless tobacco: Never   Vaping Use    Vaping status: Never Used   Substance Use Topics    Alcohol use: No     Comment: socially    Drug use: No      MEDICATIONS:   Current Outpatient Medications on File Prior to Visit   Medication Sig Dispense Refill    finasteride (PROSCAR) 5 MG tablet       imiquimod (ALDARA) 5 % cream APPLY A THIN LAYER TO THE AFFECTED AREA ON THE CHEEKS AND UPPER LIP ONCE A WEEK FOR 6 MONTHS      meclizine (ANTIVERT) 12.5 MG tablet Take 1 tablet by mouth 3 (Three) Times a Day As Needed for Dizziness. 30 tablet 0    omeprazole (PriLOSEC) 20 MG capsule Take 1 capsule by mouth Daily As Needed.      triamterene-hydrochlorothiazide (MAXZIDE-25) 37.5-25 MG per tablet TAKE 1 TABLET BY MOUTH DAILY 30 tablet 2     No current facility-administered medications on file prior to visit.       ALLERGIES: Patient has no known allergies.       Objective   Vitals:    11/13/24 1511   BP: 128/76   Weight: 77.1 kg (170 lb)   Height: 165.1 cm (65\")     Body mass index is 28.29 kg/m².          PHYSICAL EXAM:   Physical Exam  Constitutional:       Appearance: Normal appearance.   HENT:      Head: Normocephalic and atraumatic.      Nose: Nose normal.   Eyes:      Extraocular Movements: Extraocular movements intact.      Pupils: Pupils are equal, round, and reactive to light.   Cardiovascular:      Rate and " Rhythm: Normal rate.      Pulses: Normal pulses.      Heart sounds: Normal heart sounds.   Pulmonary:      Effort: Pulmonary effort is normal.      Breath sounds: Normal breath sounds.   Abdominal:      General: Abdomen is flat. Bowel sounds are normal.      Palpations: Abdomen is soft.   Musculoskeletal:         General: Normal range of motion.      Cervical back: Normal range of motion and neck supple.      Right lower le+ Edema present.      Left lower le+ Edema present.   Skin:     General: Skin is warm and dry.   Neurological:      General: No focal deficit present.      Mental Status: She is alert and oriented to person, place, and time. Mental status is at baseline.   Psychiatric:         Mood and Affect: Mood normal.         Thought Content: Thought content normal.          Result Review   LABS:    CBC          2024    10:51 10/22/2024    08:52   CBC   WBC 5.33  12.02    RBC 5.05  4.70    Hemoglobin 14.8  14.8    Hematocrit 44.8  42.7    MCV 88.7  90.9    MCH 29.3  31.5    MCHC 33.0  34.7    RDW 12.1  12.2    Platelets 238  272      BMP          2024    10:51 10/22/2024    08:52   BMP   BUN 22  16    Creatinine 0.93  0.86    Sodium 141  141    Potassium 3.8  3.9    Chloride 104  102    CO2 26.0  27.5    Calcium 9.3  9.3      Lipid Panel          10/22/2024    08:52   Lipid Panel   Total Cholesterol 214    Triglycerides 104    HDL Cholesterol 53    VLDL Cholesterol 19    LDL Cholesterol  142               Results Review:       I reviewed the patient's new clinical results.    The following radiologic or non-invasive studies have been reviewed by me: Vein mapping reviewed with images reviewed anterior branch insufficiency on the left and medial branch insufficiency with large truncal vein off of the mid thigh of the segment of the greater.  No results found for this or any previous visit from the past 365 days.     No radiology results for the last 30 days.                ASSESSMENT/PLAN:    Diagnoses and all orders for this visit:    1. History of varicose veins (Primary)  -     Duplex Venous Lower Extremity - Left CAR; Future  -     Unilateral Endovenous Laser Treatment; Future  -     Duplex Venous Lower Extremity - Right CAR; Future  -     Unilateral Endovenous Laser Treatment; Future    2. Varicose veins of both lower extremities with pain  -     Duplex Venous Lower Extremity - Left CAR; Future  -     Unilateral Endovenous Laser Treatment; Future  -     Duplex Venous Lower Extremity - Right CAR; Future  -     Unilateral Endovenous Laser Treatment; Future    3. Venous (peripheral) insufficiency  -     Duplex Venous Lower Extremity - Left CAR; Future  -     Unilateral Endovenous Laser Treatment; Future  -     Duplex Venous Lower Extremity - Right CAR; Future  -     Unilateral Endovenous Laser Treatment; Future       64 y.o. female with symptomatic varicose veins left appears slightly worse than right with left anterior branch being the primary culprit compared to the right medial branch.  We pursue a left anterior medial branch greater saphenous ablation and a right medial branch greater saphenous ablation in separate settings.  She understands the risk benefits complications of the procedure which we went through as well as the procedure itself.  At this point we will schedule at her earliest convenience and we will let you know when she is in thanks    I discussed the plan with the patient who is agreeable to the plan of care at this point. Thank you for this consult.   Follow Up  Return in about 6 weeks (around 12/25/2024).    Frank Ferrara MD   11/13/24

## 2024-11-14 LAB
BH CV LEFT LOWER VAS AA GSV REFLUX TIME: 5.57 SEC
BH CV LEFT LOWER VAS AA GSV TRANS DIAMETER: 0.5 CM
BH CV LEFT LOWER VAS GSV KNEE TRANSVERSE DIAMETER: 0.22 CM
BH CV LEFT LOWER VAS GSV MID CALF TRANS DIAMETER: 0.16 CM
BH CV LEFT LOWER VAS GSV PROX TRANSVERSE DIAMETER: 0.61 CM
BH CV LEFT LOWER VAS SAPHENOFEMORAL JUNCTION REFLUX TIME: 0.58 SEC
BH CV LEFT LOWER VAS SAPHENOFEMORAL JUNCTION TRANSVERSE DIAMETER: 0.75 CM
BH CV LEFT LOWER VAS SSV MID CALF TRANS DIAMETER: 0.12 CM
BH CV LEFT LOWER VAS SSV PROX CALF TRANS DIAMETER: 0.28 CM
BH CV LEFT LOWER VAS VARICOSITY AK TRANS DIAMETER: 0.48 CM
BH CV LOW VAS LEFT EXTERNAL ILIAC AUGMENT: NORMAL
BH CV LOW VAS LEFT EXTERNAL ILIAC COMPRESS: NORMAL
BH CV LOWER VAS LEFT GSV DIST THIGH COMPRESSIBILTY: NORMAL
BH CV LOWER VAS LEFT GSV MID CALF COMPRESSIBILTY: NORMAL
BH CV LOWER VAS LEFT GSV MID THIGH COMPRESSIBILTY: NORMAL
BH CV LOWER VAS RIGHT GSV DIST THIGH COMPRESSIBILTY: NORMAL
BH CV LOWER VAS RIGHT GSV MID CALF COMPRESSIBILTY: NORMAL
BH CV LOWER VAS RIGHT GSV MID THIGH COMPRESSIBILTY: NORMAL
BH CV LOWER VASCULAR LEFT AA GSV COMPETENT: NORMAL
BH CV LOWER VASCULAR LEFT AA GSV COMPRESS: NORMAL
BH CV LOWER VASCULAR LEFT COMMON FEMORAL AUGMENT: NORMAL
BH CV LOWER VASCULAR LEFT COMMON FEMORAL COMPETENT: NORMAL
BH CV LOWER VASCULAR LEFT COMMON FEMORAL COMPRESS: NORMAL
BH CV LOWER VASCULAR LEFT COMMON FEMORAL PHASIC: NORMAL
BH CV LOWER VASCULAR LEFT COMMON FEMORAL SPONT: NORMAL
BH CV LOWER VASCULAR LEFT DISTAL FEMORAL COMPRESS: NORMAL
BH CV LOWER VASCULAR LEFT EXTERNAL ILIAC COMPETENT: NORMAL
BH CV LOWER VASCULAR LEFT EXTERNAL ILIAC PHASIC: NORMAL
BH CV LOWER VASCULAR LEFT EXTERNAL ILIAC SPONT: NORMAL
BH CV LOWER VASCULAR LEFT GASTRONEMIUS COMPRESS: NORMAL
BH CV LOWER VASCULAR LEFT GREATER SAPH AK COMPETENT: NORMAL
BH CV LOWER VASCULAR LEFT GREATER SAPH AK COMPRESS: NORMAL
BH CV LOWER VASCULAR LEFT GREATER SAPH BK COMPRESS: NORMAL
BH CV LOWER VASCULAR LEFT GSV DIST THIGH COMPETENT: NORMAL
BH CV LOWER VASCULAR LEFT LESSER SAPH COMPETENT: NORMAL
BH CV LOWER VASCULAR LEFT LESSER SAPH COMPRESS: NORMAL
BH CV LOWER VASCULAR LEFT MID FEMORAL AUGMENT: NORMAL
BH CV LOWER VASCULAR LEFT MID FEMORAL COMPETENT: NORMAL
BH CV LOWER VASCULAR LEFT MID FEMORAL COMPRESS: NORMAL
BH CV LOWER VASCULAR LEFT PERONEAL AUGMENT: NORMAL
BH CV LOWER VASCULAR LEFT PERONEAL COMPRESS: NORMAL
BH CV LOWER VASCULAR LEFT POPLITEAL AUGMENT: NORMAL
BH CV LOWER VASCULAR LEFT POPLITEAL COMPETENT: NORMAL
BH CV LOWER VASCULAR LEFT POPLITEAL COMPRESS: NORMAL
BH CV LOWER VASCULAR LEFT POSTERIOR TIBIAL AUGMENT: NORMAL
BH CV LOWER VASCULAR LEFT POSTERIOR TIBIAL COMPRESS: NORMAL
BH CV LOWER VASCULAR LEFT PROFUNDA FEMORAL COMPRESS: NORMAL
BH CV LOWER VASCULAR LEFT PROXIMAL FEMORAL COMPRESS: NORMAL
BH CV LOWER VASCULAR LEFT SAPHENOFEMORAL JUNCTION AUGMENT: NORMAL
BH CV LOWER VASCULAR LEFT SAPHENOFEMORAL JUNCTION COMPETENT: NORMAL
BH CV LOWER VASCULAR LEFT SAPHENOFEMORAL JUNCTION COMPRESS: NORMAL
BH CV LOWER VASCULAR LEFT SAPHENOFEMORAL JUNCTION PHASIC: NORMAL
BH CV LOWER VASCULAR LEFT SAPHENOFEMORAL JUNCTION SPONT: NORMAL
BH CV LOWER VASCULAR LEFT SAPHENOPOP JX PHASIC: NORMAL
BH CV LOWER VASCULAR LEFT SOLEAL COMPRESS: NORMAL
BH CV LOWER VASCULAR LEFT SSV MID CALF COMPRESS: NORMAL
BH CV LOWER VASCULAR RIGHT COMMON FEMORAL AUGMENT: NORMAL
BH CV LOWER VASCULAR RIGHT COMMON FEMORAL COMPETENT: NORMAL
BH CV LOWER VASCULAR RIGHT COMMON FEMORAL COMPRESS: NORMAL
BH CV LOWER VASCULAR RIGHT COMMON FEMORAL PHASIC: NORMAL
BH CV LOWER VASCULAR RIGHT COMMON FEMORAL SPONT: NORMAL
BH CV LOWER VASCULAR RIGHT DISTAL FEMORAL COMPRESS: NORMAL
BH CV LOWER VASCULAR RIGHT EXTERNAL ILIAC AUGMENT: NORMAL
BH CV LOWER VASCULAR RIGHT EXTERNAL ILIAC COMPETENT: NORMAL
BH CV LOWER VASCULAR RIGHT EXTERNAL ILIAC COMPRESS: NORMAL
BH CV LOWER VASCULAR RIGHT EXTERNAL ILIAC PHASIC: NORMAL
BH CV LOWER VASCULAR RIGHT EXTERNAL ILIAC SPONT: NORMAL
BH CV LOWER VASCULAR RIGHT GASTRONEMIUS COMPRESS: NORMAL
BH CV LOWER VASCULAR RIGHT GREATER SAPH AK COMPETENT: NORMAL
BH CV LOWER VASCULAR RIGHT GREATER SAPH BK COMPETENT: NORMAL
BH CV LOWER VASCULAR RIGHT GREATER SAPH BK COMPRESS: NORMAL
BH CV LOWER VASCULAR RIGHT GSV DIST THIGH COMPETENT: NORMAL
BH CV LOWER VASCULAR RIGHT GSV MID CALF COMPETENT: NORMAL
BH CV LOWER VASCULAR RIGHT LESSER SAPH COMPETENT: NORMAL
BH CV LOWER VASCULAR RIGHT LESSER SAPH COMPRESS: NORMAL
BH CV LOWER VASCULAR RIGHT MID FEMORAL AUGMENT: NORMAL
BH CV LOWER VASCULAR RIGHT MID FEMORAL COMPETENT: NORMAL
BH CV LOWER VASCULAR RIGHT MID FEMORAL COMPRESS: NORMAL
BH CV LOWER VASCULAR RIGHT PERONEAL AUGMENT: NORMAL
BH CV LOWER VASCULAR RIGHT PERONEAL COMPRESS: NORMAL
BH CV LOWER VASCULAR RIGHT POPLITEAL AUGMENT: NORMAL
BH CV LOWER VASCULAR RIGHT POPLITEAL COMPETENT: NORMAL
BH CV LOWER VASCULAR RIGHT POPLITEAL COMPRESS: NORMAL
BH CV LOWER VASCULAR RIGHT POSTERIOR TIBIAL AUGMENT: NORMAL
BH CV LOWER VASCULAR RIGHT POSTERIOR TIBIAL COMPRESS: NORMAL
BH CV LOWER VASCULAR RIGHT PROFUNDA FEMORAL COMPRESS: NORMAL
BH CV LOWER VASCULAR RIGHT PROXIMAL FEMORAL COMPRESS: NORMAL
BH CV LOWER VASCULAR RIGHT SAPHENOFEMORAL JUNCTION AUGMENT: NORMAL
BH CV LOWER VASCULAR RIGHT SAPHENOFEMORAL JUNCTION COMPETENT: NORMAL
BH CV LOWER VASCULAR RIGHT SAPHENOFEMORAL JUNCTION COMPRESS: NORMAL
BH CV LOWER VASCULAR RIGHT SAPHENOFEMORAL JUNCTION PHASIC: NORMAL
BH CV LOWER VASCULAR RIGHT SAPHENOFEMORAL JUNCTION SPONT: NORMAL
BH CV LOWER VASCULAR RIGHT SOLEAL COMPRESS: NORMAL
BH CV LOWER VASCULAR RIGHT SSV MID CALF COMPETENT: NORMAL
BH CV LOWER VASCULAR RIGHT SSV MID CALF COMPRESS: NORMAL
BH CV RIGHT LOWER VAS GSV KNEE TRANS DIAMETER: 0.3 CM
BH CV RIGHT LOWER VAS GSV MID CALF TRANS DIAMETER: 0.28 CM
BH CV RIGHT LOWER VAS GSV PROX CALF REFLUX TIME: 1.04 SEC
BH CV RIGHT LOWER VAS GSV PROX THIGH TRANS DIAMETER: 0.58 CM
BH CV RIGHT LOWER VAS SAPHENOFEM JUNCTION REFLUX TIME: 1.39 SEC
BH CV RIGHT LOWER VAS SAPHENOFEM JUNCTION TRANSVERSE DIAMETER: 0.76 CM
BH CV RIGHT LOWER VAS SSV MID CALF TRANS DIAMETER: 0.13 CM
BH CV RIGHT LOWER VAS SSV PROX CALF TRANS DIAMETER: 0.18 CM
BH CV RIGHT LOWER VAS VARICOSITY BK TRANS DIAMETER: 0.45 CM
BH CV VAS RIGHT GSV PROXIMAL HIDDEN LRR COMPRESSIBILTY: NORMAL

## 2025-01-07 RX ORDER — TRIAMTERENE AND HYDROCHLOROTHIAZIDE 37.5; 25 MG/1; MG/1
1 TABLET ORAL DAILY
Qty: 30 TABLET | Refills: 2 | Status: SHIPPED | OUTPATIENT
Start: 2025-01-07

## 2025-01-07 NOTE — TELEPHONE ENCOUNTER
Caller: DONNA MAURICIO    Relationship: PATIENT    Best call back number:     What is the best time to reach you:     Who are you requesting to speak with (clinical staff, provider,  specific staff member):     Do you know the name of the person who called:     What was the call regarding: PATIENT SAYS THAT HE REFILL REQUEST SHOULD GO TO THE    [facility] [address] [facility] AT [address] [facility] [[email or web address]] [phone] [facility] [phone] FX

## 2025-01-07 NOTE — TELEPHONE ENCOUNTER
Attempted to call patient, GONSALOM requesting [facility] back    HUB TO READ  We received a refill request for your Triamterene HCTZ. What pharmacy should this go to?

## 2025-01-20 ENCOUNTER — OFFICE VISIT (OUTPATIENT)
Dept: INTERNAL MEDICINE | Facility: CLINIC | Age: 65
End: 2025-01-20
Payer: COMMERCIAL

## 2025-01-20 VITALS
HEART RATE: 64 BPM | SYSTOLIC BLOOD PRESSURE: 128 MMHG | OXYGEN SATURATION: 99 % | DIASTOLIC BLOOD PRESSURE: 68 MMHG | TEMPERATURE: 97.9 F | HEIGHT: 65 IN | BODY MASS INDEX: 29.49 KG/M2 | WEIGHT: 177 LBS

## 2025-01-20 DIAGNOSIS — J06.9 VIRAL UPPER RESPIRATORY TRACT INFECTION: ICD-10-CM

## 2025-01-20 DIAGNOSIS — R52 GENERALIZED BODY ACHES: Primary | ICD-10-CM

## 2025-01-20 DIAGNOSIS — R05.1 ACUTE COUGH: ICD-10-CM

## 2025-01-20 LAB
EXPIRATION DATE: NORMAL
FLUAV AG UPPER RESP QL IA.RAPID: NOT DETECTED
FLUBV AG UPPER RESP QL IA.RAPID: NOT DETECTED
INTERNAL CONTROL: NORMAL
Lab: NORMAL
SARS-COV-2 AG UPPER RESP QL IA.RAPID: NOT DETECTED

## 2025-01-20 PROCEDURE — 87428 SARSCOV & INF VIR A&B AG IA: CPT

## 2025-01-20 PROCEDURE — 99213 OFFICE O/P EST LOW 20 MIN: CPT

## 2025-01-20 RX ORDER — BENZONATATE 100 MG/1
200 CAPSULE ORAL 3 TIMES DAILY PRN
Qty: 30 CAPSULE | Refills: 0 | Status: SHIPPED | OUTPATIENT
Start: 2025-01-20

## 2025-01-20 RX ORDER — METHYLPREDNISOLONE 4 MG/1
TABLET ORAL
Qty: 21 TABLET | Refills: 0 | Status: SHIPPED | OUTPATIENT
Start: 2025-01-20

## 2025-01-20 NOTE — PATIENT INSTRUCTIONS
Upper Respiratory Infection, Adult  An upper respiratory infection (URI) affects the nose, throat, and upper airways that lead to the lungs. The most common type of URI is often called the common cold. URIs usually get better on their own, without medical treatment.  What are the causes?  A URI is caused by a germ (virus). You may catch these germs by:  Breathing in droplets from an infected person's cough or sneeze.  Touching something that has the germ on it (is contaminated) and then touching your mouth, nose, or eyes.  What increases the risk?  You are more likely to get a URI if:  You are very young or very old.  You have close contact with others, such as at work, school, or a health care facility.  You smoke.  You have long-term (chronic) heart or lung disease.  You have a weakened disease-fighting system (immune system).  You have nasal allergies or asthma.  You have a lot of stress.  You have poor nutrition.  What are the signs or symptoms?  Runny or stuffy (congested) nose.  Cough.  Sneezing.  Sore throat.  Headache.  Feeling tired (fatigue).  Fever.  Not wanting to eat as much as usual.  Pain in your forehead, behind your eyes, and over your cheekbones (sinus pain).  Muscle aches.  Redness or irritation of the eyes.  Pressure in the ears or face.  How is this treated?  URIs usually get better on their own within 7-10 days. Medicines cannot cure URIs, but your doctor may recommend certain medicines to help relieve symptoms, such as:  Over-the-counter cold medicines.  Medicines to reduce coughing (cough suppressants). Coughing is a type of defense against infection that helps to clear the nose, throat, windpipe, and lungs (respiratory system). Take these medicines only as told by your doctor.  Medicines to lower your fever.  Follow these instructions at home:  Activity  Rest as needed.  If you have a fever, stay home from work or school until your fever is gone, or until your doctor says you may return to  work or school.  You should stay home until you cannot spread the infection anymore (you are not contagious).  Your doctor may have you wear a face mask so you have less risk of spreading the infection.  Relieving symptoms  Rinse your mouth often with salt water. To make salt water, dissolve ½-1 tsp (3-6 g) of salt in 1 cup (237 mL) of warm water.  Use a cool-mist humidifier to add moisture to the air. This can help you breathe more easily.  Eating and drinking    Drink enough fluid to keep your pee (urine) pale yellow.  Eat soups and other clear broths.  General instructions    Take over-the-counter and prescription medicines only as told by your doctor.  Do not smoke or use any products that contain nicotine or tobacco. If you need help quitting, ask your doctor.  Avoid being where people are smoking (avoid secondhand smoke).  Stay up to date on all your shots (immunizations), and get the flu shot every year.  Keep all follow-up visits.  How to prevent the spread of infection to others    Wash your hands with soap and water for at least 20 seconds. If you cannot use soap and water, use hand .  Avoid touching your mouth, face, eyes, or nose.  Cough or sneeze into a tissue or your sleeve or elbow. Do not cough or sneeze into your hand or into the air.  Contact a doctor if:  You are getting worse, not better.  You have any of these:  A fever or chills.  Brown or red mucus in your nose.  Yellow or brown fluid (discharge)coming from your nose.  Pain in your face, especially when you bend forward.  Swollen neck glands.  Pain when you swallow.  White areas in the back of your throat.  Get help right away if:  You have shortness of breath that gets worse.  You have very bad or constant:  Headache.  Ear pain.  Pain in your forehead, behind your eyes, and over your cheekbones (sinus pain).  Chest pain.  You have long-lasting (chronic) lung disease along with any of these:  Making high-pitched whistling sounds when  you breathe, most often when you breathe out (wheezing).  Long-lasting cough (more than 14 days).  Coughing up blood.  A change in your usual mucus.  You have a stiff neck.  You have changes in your:  Vision.  Hearing.  Thinking.  Mood.  These symptoms may be an emergency. Get help right away. Call 911.  Do not wait to see if the symptoms will go away.  Do not drive yourself to the hospital.  Summary  An upper respiratory infection (URI) is caused by a germ (virus). The most common type of URI is often called the common cold.  URIs usually get better within 7-10 days.  Take over-the-counter and prescription medicines only as told by your doctor.  This information is not intended to replace advice given to you by your health care provider. Make sure you discuss any questions you have with your health care provider.  Document Revised: 07/20/2022 Document Reviewed: 07/20/2022  [employer] Patient Education © 2024 [employer]

## 2025-01-20 NOTE — PROGRESS NOTES
Chief Complaint  Generalized Body Aches (Started 25), Cough (Started 25), Nasal Congestion (Started 25), and URI (Started 25 , low grade fever 99.9)     Subjective:      History of Present Illness {CC  Problem List  Visit  Diagnosis   Encounters  Notes  Medications  Labs  Result Review Imaging  Media :23}     Carlie Brennan presents to [facility] for:    Patient or patient representative verbalized consent for the use of Ambient Listening during the visit with  RAY Couch for chart documentation. 2025  09:25 EST     History of Present Illness      The patient is a female who presents for evaluation of generalized muscle aches, fatigue, and upper respiratory symptoms that started yesterday.    She reports experiencing generalized body aches, cough, nasal congestion, headache, and a potential low-grade fever since yesterday. She has been self-medicating with Mucinex and Delsym but is uncertain of their efficacy due to excessive sleepiness. Her nasal discharge is clear, and she experiences chest tightness when lying down at night. Deep inhalation triggers her cough. She has not previously used Tessalon Perles. She has been utilizing a humidifier at home but does not use Vicks. 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    SOCIAL HISTORY  She works as a pharmacy technician.    MEDICATIONS  Mucinex, Delsym     Past Medical History:   Diagnosis Date    Colon polyp     Found during colonoscopy    GERD (gastroesophageal reflux disease)     Glaucoma     Pressure remains low but have it checked regularly.    History of varicose veins     BLE with pain    Hypertension     Iron deficiency anemia 2018    Localized edema     2023    Meniere's disease, unspecified ear     Pregnancy         Rosacea     2023    Vertigo      Family History   Problem Relation  Age of Onset    Meniere's disease Mother     Dementia Mother     COPD Mother     Migraines Sister     Suicidality Brother     Coronary artery disease Brother      Social History     Tobacco Use   Smoking Status Never   Smokeless Tobacco Never     Social History     Substance and Sexual Activity   Alcohol Use No    Comment: socially         I have reviewed patient's medical history, any new submitted information provided by patient or medical assistant and updated medical record.      Objective:      Physical Exam  Vitals reviewed.   Constitutional:       General: She is awake. She is not in acute distress.     Appearance: Normal appearance. She is not ill-appearing, toxic-appearing or diaphoretic.   HENT:      Head: Normocephalic.      Right Ear: Hearing normal.      Left Ear: Hearing normal.      Nose: Nasal tenderness, congestion and rhinorrhea present. Rhinorrhea is clear.      Right Turbinates: Enlarged and swollen. Not pale.      Left Turbinates: Enlarged and swollen. Not pale.      Right Sinus: No maxillary sinus tenderness or frontal sinus tenderness.      Left Sinus: No maxillary sinus tenderness or frontal sinus tenderness.      Mouth/Throat:      Lips: Pink.      Mouth: Mucous membranes are moist.   Eyes:      General: Lids are normal. Vision grossly intact. Allergic shiner present.      Conjunctiva/sclera: Conjunctivae normal.   Neck:      Vascular: No carotid bruit or JVD.   Cardiovascular:      Rate and Rhythm: Normal rate and regular rhythm.      Pulses: Normal pulses.      Heart sounds: Normal heart sounds, S1 normal and S2 normal.   Pulmonary:      Effort: Pulmonary effort is normal.      Breath sounds: No stridor, decreased air movement or transmitted upper airway sounds. No decreased breath sounds, wheezing, rhonchi or rales.   Lymphadenopathy:      Head:      Right side of head: No tonsillar adenopathy.      Left side of head: No tonsillar adenopathy.      Cervical: No cervical adenopathy.       "Right cervical: No superficial, deep or posterior cervical adenopathy.     Left cervical: No superficial, deep or posterior cervical adenopathy.   Skin:     General: Skin is warm and dry.      Capillary Refill: Capillary refill takes less than 2 seconds.   Neurological:      General: No focal deficit present.      Mental Status: She is alert and oriented to person, place, and time. Mental status is at baseline.      Coordination: Coordination is intact.      Gait: Gait is intact.   Psychiatric:         Attention and Perception: Attention and perception normal.         Mood and Affect: Mood and affect normal.         Speech: Speech normal.         Behavior: Behavior normal. Behavior is cooperative.         Thought Content: Thought content normal.         Cognition and Memory: Cognition and memory normal.         Judgment: Judgment normal.        Result Review  Data Reviewed:{ Labs  Result Review  Imaging  Med Tab  Media :23}     Results  Laboratory Studies  COVID-19 and influenza tests are negative.       The following data was reviewed by: RAY Couch on 01/20/2025  POCT SARS-CoV-2 Antigen RAINER + Flu (01/20/2025 9:36 AM)              Vital Signs:   /68 (BP Location: Left arm, Patient Position: Sitting, Cuff Size: Adult)   Pulse 64   Temp 97.9 °F (36.6 °C) (Oral)   Ht 165.1 cm (65\")   Wt 80.3 kg (177 lb)   SpO2 99%   BMI 29.45 kg/m²                   Requested Prescriptions     Signed Prescriptions Disp Refills    methylPREDNISolone (MEDROL) 4 MG dose pack 21 tablet 0     Sig: Take as directed on package instructions.    benzonatate (Tessalon Perles) 100 MG capsule 30 capsule 0     Sig: Take 2 capsules by mouth 3 (Three) Times a Day As Needed for Cough.       Routine medications provided by this office will also be refilled via pharmacy request.       Current Outpatient Medications:     finasteride (PROSCAR) 5 MG tablet, , Disp: , Rfl:     meclizine (ANTIVERT) 12.5 MG tablet, Take 1 tablet by " mouth 3 (Three) Times a Day As Needed for Dizziness., Disp: 30 tablet, Rfl: 0    omeprazole (PriLOSEC) 20 MG capsule, Take 1 capsule by mouth Daily As Needed., Disp: , Rfl:     triamterene-hydrochlorothiazide (MAXZIDE-25) 37.5-25 MG per tablet, Take 1 tablet by mouth Daily., Disp: 30 tablet, Rfl: 2    benzonatate (Tessalon Perles) 100 MG capsule, Take 2 capsules by mouth 3 (Three) Times a Day As Needed for Cough., Disp: 30 capsule, Rfl: 0    methylPREDNISolone (MEDROL) 4 MG dose pack, Take as directed on package instructions., Disp: 21 tablet, Rfl: 0     Assessment and Plan:      Assessment and Plan {CC Problem List  Visit Diagnosis  ROS  Review (Popup)  Health Maintenance  Quality  BestPractice  Medications  SmartSets  SnapShot Encounters  Media :23}     Problem List Items Addressed This Visit    None  Visit Diagnoses       Generalized body aches    -  Primary    Relevant Orders    POCT SARS-CoV-2 Antigen RAINER + Flu (Completed)    Acute cough        Relevant Medications    methylPREDNISolone (MEDROL) 4 MG dose pack    Viral upper respiratory tract infection        Relevant Medications    methylPREDNISolone (MEDROL) 4 MG dose pack                 1. Upper respiratory infection.  Symptoms include generalized muscle aches, fatigue, cough, nasal congestion, headache, and a low-grade fever. The patient reports clear nasal and cough secretions, indicating a viral etiology. COVID-19 and influenza tests are negative. A Medrol Dosepak (21 count) will be prescribed to address inflammation and chest tightness. She is advised to complete the entire course. Tessalon Perles will be prescribed as needed for cough suppression, with a recommendation to take 1 or 2 tablets up to 3 times a day, and specifically 2 tablets at night for rest. She can continue using Delsym. The use of a humidifier and Vicks is recommended. [facility]. If symptoms worsen, she should return for a follow-up visit to  evaluate the need for a chest x-ray.     Please review added information under the Patient Instructions portion of your print out.    Thank you for allowing us to care for you,  RAY Couch      Follow Up {Instructions Charge Capture  Follow-up Communications :23}     Return if symptoms worsen or fail to improve.      Patient was given instructions and counseling regarding her condition or for health maintenance advice. Please see specific information pulled into the AVS if appropriate.        Dragon disclaimer:   Much of this encounter note is an electronic transcription/translation of spoken language to printed text. The electronic translation of spoken language may permit erroneous, or at times, nonsensical words or phrases to be inadvertently transcribed; Although I have reviewed the note for such errors, some may still exist.     Additional Patient Counseling:       Patient Instructions     Upper Respiratory Infection, Adult  An upper respiratory infection (URI) affects the nose, throat, and upper airways that lead to the lungs. The most common type of URI is often called the common cold. URIs usually get better on their own, without medical treatment.  What are the causes?  A URI is caused by a germ (virus). You may catch these germs by:  Breathing in droplets from an infected person's cough or sneeze.  Touching something that has the germ on it (is contaminated) and then touching your mouth, nose, or eyes.  What increases the risk?  You are more likely to get a URI if:  You are very young or very old.  You have close contact with others, such as at work, school, or a health care facility.  You smoke.  You have long-term (chronic) heart or lung disease.  You have a weakened disease-fighting system (immune system).  You have nasal allergies or asthma.  You have a lot of stress.  You have poor nutrition.  What are the signs or symptoms?  Runny or stuffy (congested) nose.  Cough.  Sneezing.  Sore  throat.  Headache.  Feeling tired (fatigue).  Fever.  Not wanting to eat as much as usual.  Pain in your forehead, behind your eyes, and over your cheekbones (sinus pain).  Muscle aches.  Redness or irritation of the eyes.  Pressure in the ears or face.  How is this treated?  URIs usually get better on their own within 7-10 days. Medicines cannot cure URIs, but your doctor may recommend certain medicines to help relieve symptoms, such as:  Over-the-counter cold medicines.  Medicines to reduce coughing (cough suppressants). Coughing is a type of defense against infection that helps to clear the nose, throat, windpipe, and lungs (respiratory system). Take these medicines only as told by your doctor.  Medicines to lower your fever.  Follow these instructions at home:  Activity  Rest as needed.  If you have a fever, stay home from work or school until your fever is gone, or until your doctor says you may return to work or school.  You should stay home until you cannot spread the infection anymore (you are not contagious).  Your doctor may have you wear a face mask so you have less risk of spreading the infection.  Relieving symptoms  Rinse your mouth often with salt water. To make salt water, dissolve ½-1 tsp (3-6 g) of salt in 1 cup (237 mL) of warm water.  Use a cool-mist humidifier to add moisture to the air. This can help you breathe more easily.  Eating and drinking    Drink enough fluid to keep your pee (urine) pale yellow.  Eat soups and other clear broths.  General instructions    Take over-the-counter and prescription medicines only as told by your doctor.  Do not smoke or use any products that contain nicotine or tobacco. If you need help quitting, ask your doctor.  Avoid being where people are smoking (avoid secondhand smoke).  Stay up to date on all your shots (immunizations), and get the flu shot every year.  Keep all follow-up visits.  How to prevent the spread of infection to others    Wash your hands  with soap and water for at least 20 seconds. If you cannot use soap and water, use hand .  Avoid touching your mouth, face, eyes, or nose.  Cough or sneeze into a tissue or your sleeve or elbow. Do not cough or sneeze into your hand or into the air.  Contact a doctor if:  You are getting worse, not better.  You have any of these:  A fever or chills.  Brown or red mucus in your nose.  Yellow or brown fluid (discharge)coming from your nose.  Pain in your face, especially when you bend forward.  Swollen neck glands.  Pain when you swallow.  White areas in the back of your throat.  Get help right away if:  You have shortness of breath that gets worse.  You have very bad or constant:  Headache.  Ear pain.  Pain in your forehead, behind your eyes, and over your cheekbones (sinus pain).  Chest pain.  You have long-lasting (chronic) lung disease along with any of these:  Making high-pitched whistling sounds when you breathe, most often when you breathe out (wheezing).  Long-lasting cough (more than 14 days).  Coughing up blood.  A change in your usual mucus.  You have a stiff neck.  You have changes in your:  Vision.  Hearing.  Thinking.  Mood.  These symptoms may be an emergency. Get help right away. Call 911.  Do not wait to see if the symptoms will go away.  Do not drive yourself to the hospital.  Summary  An upper respiratory infection (URI) is caused by a germ (virus). The most common type of URI is often called the common cold.  URIs usually get better within 7-10 days.  Take over-the-counter and prescription medicines only as told by your doctor.  This information is not intended to replace advice given to you by your health care provider. Make sure you discuss any questions you have with your health care provider.  Document Revised: 07/20/2022 Document Reviewed: 07/20/2022  Elsevier Patient Education © 2024 [employer]

## 2025-01-29 ENCOUNTER — HOSPITAL ENCOUNTER (OUTPATIENT)
Facility: HOSPITAL | Age: 65
Discharge: HOME | End: 2025-01-29
Admitting: SURGERY
Payer: COMMERCIAL

## 2025-01-29 ENCOUNTER — OFFICE VISIT (OUTPATIENT)
Age: 65
End: 2025-01-29
Payer: COMMERCIAL

## 2025-01-29 VITALS
OXYGEN SATURATION: 97 % | BODY MASS INDEX: 29.97 KG/M2 | DIASTOLIC BLOOD PRESSURE: 72 MMHG | WEIGHT: 179.9 LBS | HEIGHT: 65 IN | HEART RATE: 72 BPM | TEMPERATURE: 97.5 F | SYSTOLIC BLOOD PRESSURE: 118 MMHG

## 2025-01-29 DIAGNOSIS — I87.2 VENOUS (PERIPHERAL) INSUFFICIENCY: Primary | ICD-10-CM

## 2025-01-29 DIAGNOSIS — Z86.79 HISTORY OF VARICOSE VEINS: ICD-10-CM

## 2025-01-29 DIAGNOSIS — I83.813 VARICOSE VEINS OF BOTH LOWER EXTREMITIES WITH PAIN: Chronic | ICD-10-CM

## 2025-01-29 DIAGNOSIS — I87.2 VENOUS (PERIPHERAL) INSUFFICIENCY: ICD-10-CM

## 2025-01-29 LAB
BH CV LOW VAS LEFT EXTERNAL ILIAC COMPRESS: NORMAL
BH CV LOW VAS LEFT GREAT SAPH AK CM FIELD: 2.08 CM
BH CV LOW VAS LEFT GREATER SAPH AK VESSEL: 1
BH CV LOW VAS LEFT GREATER SAPH BK VESSEL: 1
BH CV LOWER VASCULAR LEFT ANTERIOR SAPH COMPRESS: NORMAL
BH CV LOWER VASCULAR LEFT ANTERIOR SAPH VESSEL SCRIPT: 1
BH CV LOWER VASCULAR LEFT COMMON FEMORAL COMPRESS: NORMAL
BH CV LOWER VASCULAR LEFT DISTAL FEMORAL COMPRESS: NORMAL
BH CV LOWER VASCULAR LEFT GASTRONEMIUS COMPRESS: NORMAL
BH CV LOWER VASCULAR LEFT GREATER SAPH AK COMPRESS: NORMAL
BH CV LOWER VASCULAR LEFT GREATER SAPH BK COMPRESS: NORMAL
BH CV LOWER VASCULAR LEFT MID FEMORAL COMPRESS: NORMAL
BH CV LOWER VASCULAR LEFT PERONEAL COMPRESS: NORMAL
BH CV LOWER VASCULAR LEFT POPLITEAL COMPRESS: NORMAL
BH CV LOWER VASCULAR LEFT POSTERIOR TIBIAL COMPRESS: NORMAL
BH CV LOWER VASCULAR LEFT PROFUNDA FEMORAL COMPRESS: NORMAL
BH CV LOWER VASCULAR LEFT PROXIMAL FEMORAL COMPRESS: NORMAL
BH CV LOWER VASCULAR LEFT SAPHENOFEMORAL JUNCTION COMPRESS: NORMAL

## 2025-01-29 PROCEDURE — 93971 EXTREMITY STUDY: CPT

## 2025-01-29 PROCEDURE — 93971 EXTREMITY STUDY: CPT | Performed by: SURGERY

## 2025-01-29 NOTE — PROGRESS NOTES
Patient Name: Carlie Brennan    MRN: [id number] Encounter Date: 2025      Consulting Service: Vascular Surgery    Referring Provider: No ref. provider found       CHIEF COMPLAINT:  Chief Complaint   Patient presents with    Post op     EVLA       Subjective    HPI: Carlie Brennan is a 64 y.o. female is being seen for evaluation/management of  left greater medial and anterior branch ablations.  She had bifurcated anterior branch that we got above the bifurcation with duplex her with EVALI and I am hopeful that the large branches on her thigh will shrink and go away if they do not secondary procedure can be done but will wait a full 3 months and give her a good chance to close things down.  She is getting her other leg done on February 10 and we will set her final scan for both legs up in 3 months    PAST MEDICAL HISTORY:   Past Medical History:   Diagnosis Date    Colon polyp     Found during colonoscopy    GERD (gastroesophageal reflux disease)     Glaucoma     Pressure remains low but have it checked regularly.    History of varicose veins     BLE with pain    Hypertension     Iron deficiency anemia 2018    Localized edema     2023    Meniere's disease, unspecified ear     Pregnancy         Rosacea     2023    Vertigo       PAST SURGICAL HISTORY:   Past Surgical History:   Procedure Laterality Date    ABDOMINOPLASTY       SECTION      x 2    COLONOSCOPY N/A 2018    Procedure: COLONOSCOPY INTO CECUM AND NORMAL TI WITH HOT SNARE POLYPECTOMY;  Surgeon: Lincoln Chino MD;  Location: [facility] ENDOSCOPY;  Service: Gastroenterology    COLONOSCOPY N/A 2024    Procedure: COLONOSCOPY INTO CECUM AND TI WITH POLYPECTOMIES (COLD BX/COLD SNARE);  Surgeon: Lincoln Chino MD;  Location: [facility] ENDOSCOPY;  Service: Gastroenterology;  Laterality: N/A;  PRE: HX OF POLYPS  POST: POLYPS, DIVERTICULOSIS, h emorrhoids    COSMETIC SURGERY  10/2016    Abdominoplasty    LASIK   "    VARICOSE VEIN SURGERY Left 01/27/2025    Left GSV Medial and Anterior Branch Ablation      FAMILY HISTORY:   Family History   Problem Relation Age of Onset    Meniere's disease Mother     Dementia Mother     COPD Mother     Migraines Sister     Suicidality Brother     Coronary artery disease Brother       SOCIAL HISTORY:   Social History     Tobacco Use    Smoking status: Never    Smokeless tobacco: Never   Vaping Use    Vaping status: Never Used   Substance Use Topics    Alcohol use: No     Comment: socially    Drug use: No      MEDICATIONS:   Current Outpatient Medications on File Prior to Visit   Medication Sig Dispense Refill    finasteride (PROSCAR) 5 MG tablet       HYDROcodone-acetaminophen (Norco) 5-325 MG per tablet Take 1 tablet by mouth Every 6 (Six) Hours As Needed for Severe Pain. 20 tablet 0    meclizine (ANTIVERT) 12.5 MG tablet Take 1 tablet by mouth 3 (Three) Times a Day As Needed for Dizziness. 30 tablet 0    naproxen (Naprosyn) 500 MG tablet Take 1 tablet by mouth 2 (Two) Times a Day With Meals. 20 tablet 1    omeprazole (PriLOSEC) 20 MG capsule Take 1 capsule by mouth Daily As Needed.      triamterene-hydrochlorothiazide (MAXZIDE-25) 37.5-25 MG per tablet Take 1 tablet by mouth Daily. 30 tablet 2    benzonatate (Tessalon Perles) 100 MG capsule Take 2 capsules by mouth 3 (Three) Times a Day As Needed for Cough. (Patient not taking: Reported on 1/29/2025) 30 capsule 0    methylPREDNISolone (MEDROL) 4 MG dose pack Take as directed on package instructions. (Patient not taking: Reported on 1/29/2025) 21 tablet 0     No current facility-administered medications on file prior to visit.       ALLERGIES: Patient has no known allergies.       Objective   Vitals:    01/29/25 0833   BP: 118/72   Pulse: 72   Temp: 97.5 °F (36.4 °C)   TempSrc: Temporal   SpO2: 97%   Weight: 81.6 kg (179 lb 14.4 oz)   Height: 165.1 cm (65\")     Body mass index is 29.94 kg/m².          PHYSICAL EXAM:   Physical Exam   Left " leg with minimal bruising and no cellulitis.  Veins are shrinking  Result Review   LABS:    CBC          4/25/2024    10:51 10/22/2024    08:52   CBC   WBC 5.33  12.02    RBC 5.05  4.70    Hemoglobin 14.8  14.8    Hematocrit 44.8  42.7    MCV 88.7  90.9    MCH 29.3  31.5    MCHC 33.0  34.7    RDW 12.1  12.2    Platelets 238  272      BMP          4/25/2024    10:51 10/22/2024    08:52   BMP   BUN 22  16    Creatinine 0.93  0.86    Sodium 141  141    Potassium 3.8  3.9    Chloride 104  102    CO2 26.0  27.5    Calcium 9.3  9.3      Lipid Panel          10/22/2024    08:52   Lipid Panel   Total Cholesterol 214    Triglycerides 104    HDL Cholesterol 53    VLDL Cholesterol 19    LDL Cholesterol  142               Results Review:       I reviewed the patient's new clinical results.    The following radiologic or non-invasive studies have been reviewed by me: Venous duplex today shows no DVT with successful medial and anterior branch ablations  Venous w Reflux Lower Extremity - Bilateral CAR 11/11/2024    Interpretation Summary    Severe right saphenofemoral junction reflux.  Severe right greater saphenous vein insufficiency.  Normal short saphenous vein without reflux.  Moderate deep vein insufficiency without DVT noted.    Moderate left saphenofemoral junction reflux.  Severe left greater saphenous anterior branch vein insufficiency.  Normal short saphenous vein without reflux.  Normal deep venous study without insufficiency or DVT.     No radiology results for the last 30 days.                ASSESSMENT/PLAN:   Diagnoses and all orders for this visit:    1. Venous (peripheral) insufficiency (Primary)  -     Duplex Venous Lower Extremity - Bilateral CAR; Future    2. Varicose veins of both lower extremities with pain  -     Duplex Venous Lower Extremity - Bilateral CAR; Future       64 y.o. female with successful left leg EV LA.  Plans for right and the next several weeks.  Will do final scan in 3 months for both legs  and if she needs further treatment at that time we will discuss options.  I am hopeful we will be 1 and done on both legs.  She will call if there is any problems otherwise we will continue to wear stockings.    I discussed the plan with the patient who is agreeable to the plan of care at this point. Thank you for this consult.   Follow Up  Return in about 3 months (around 4/29/2025).    Frank Ferrara MD   01/29/25

## 2025-02-03 DIAGNOSIS — I83.813 VARICOSE VEINS OF BOTH LOWER EXTREMITIES WITH PAIN: Primary | Chronic | ICD-10-CM

## 2025-02-03 RX ORDER — LORAZEPAM 1 MG/1
1 TABLET ORAL TAKE AS DIRECTED
Qty: 4 TABLET | Refills: 0 | Status: SHIPPED | OUTPATIENT
Start: 2025-02-03

## 2025-02-12 ENCOUNTER — OFFICE VISIT (OUTPATIENT)
Age: 65
End: 2025-02-12
Payer: COMMERCIAL

## 2025-02-12 ENCOUNTER — HOSPITAL ENCOUNTER (OUTPATIENT)
Facility: HOSPITAL | Age: 65
Discharge: HOME | End: 2025-02-12
Admitting: SURGERY
Payer: COMMERCIAL

## 2025-02-12 VITALS
HEART RATE: 82 BPM | DIASTOLIC BLOOD PRESSURE: 78 MMHG | OXYGEN SATURATION: 96 % | HEIGHT: 65 IN | BODY MASS INDEX: 29.89 KG/M2 | SYSTOLIC BLOOD PRESSURE: 136 MMHG | WEIGHT: 179.4 LBS | TEMPERATURE: 97.5 F

## 2025-02-12 DIAGNOSIS — I87.2 VENOUS (PERIPHERAL) INSUFFICIENCY: ICD-10-CM

## 2025-02-12 DIAGNOSIS — I83.813 VARICOSE VEINS OF BOTH LOWER EXTREMITIES WITH PAIN: Chronic | ICD-10-CM

## 2025-02-12 DIAGNOSIS — Z86.79 HISTORY OF VARICOSE VEINS: Primary | ICD-10-CM

## 2025-02-12 DIAGNOSIS — Z86.79 HISTORY OF VARICOSE VEINS: ICD-10-CM

## 2025-02-12 LAB
BH CV LOW VAS RIGHT GREAT SAPH AK CM FIELD: 0.39 CM
BH CV LOW VAS RIGHT GREATER SAPH AK VESSEL: 1
BH CV LOW VAS RIGHT GREATER SAPH BK VESSEL: 1
BH CV LOWER VASCULAR RIGHT COMMON FEMORAL COMPRESS: NORMAL
BH CV LOWER VASCULAR RIGHT DISTAL FEMORAL COMPRESS: NORMAL
BH CV LOWER VASCULAR RIGHT EXTERNAL ILIAC COMPRESS: NORMAL
BH CV LOWER VASCULAR RIGHT GASTRONEMIUS COMPRESS: NORMAL
BH CV LOWER VASCULAR RIGHT GREATER SAPH AK COMPRESS: NORMAL
BH CV LOWER VASCULAR RIGHT GREATER SAPH BK COMPRESS: NORMAL
BH CV LOWER VASCULAR RIGHT LESSER SAPH COMPRESS: NORMAL
BH CV LOWER VASCULAR RIGHT MID FEMORAL COMPRESS: NORMAL
BH CV LOWER VASCULAR RIGHT PERONEAL COMPRESS: NORMAL
BH CV LOWER VASCULAR RIGHT POPLITEAL COMPRESS: NORMAL
BH CV LOWER VASCULAR RIGHT POSTERIOR TIBIAL COMPRESS: NORMAL
BH CV LOWER VASCULAR RIGHT PROFUNDA FEMORAL COMPRESS: NORMAL
BH CV LOWER VASCULAR RIGHT PROXIMAL FEMORAL COMPRESS: NORMAL
BH CV LOWER VASCULAR RIGHT SAPHENOFEMORAL JUNCTION COMPRESS: NORMAL
BH CV LOWER VASCULAR RIGHT SOLEAL COMPRESS: NORMAL

## 2025-02-12 PROCEDURE — 93971 EXTREMITY STUDY: CPT

## 2025-02-12 RX ORDER — NAPROXEN 500 MG/1
500 TABLET ORAL 2 TIMES DAILY WITH MEALS
Qty: 20 TABLET | Refills: 1 | OUTPATIENT
Start: 2025-02-12

## 2025-02-12 NOTE — PROGRESS NOTES
Patient Name: Carlie Brennan    MRN: [id number] Encounter Date: 2025      Consulting Service: Vascular Surgery    Referring Provider: No ref. provider found       CHIEF COMPLAINT:  Chief Complaint   Patient presents with    Post op     EVLA       Subjective    HPI: Carlie Brennan is a 64 y.o. female is being seen for evaluation/management of  now right leg greater saphenous ablation.  Things look great.  This is easier on her possibly because she took the Ativan or possibly just because it was a single vein that was easier to treat.  Regardless it looks good.  She will watch the area around her knee to make sure if anything starts to close she has not compressed.  She has a vein in that area that I think will go down.    PAST MEDICAL HISTORY:   Past Medical History:   Diagnosis Date    Colon polyp     Found during colonoscopy    GERD (gastroesophageal reflux disease)     Glaucoma     Pressure remains low but have it checked regularly.    History of varicose veins     BLE with pain    Hypertension     Iron deficiency anemia 2018    Localized edema     2023    Meniere's disease, unspecified ear     Pregnancy         Rosacea     2023    Vertigo       PAST SURGICAL HISTORY:   Past Surgical History:   Procedure Laterality Date    ABDOMINOPLASTY       SECTION      x 2    COLONOSCOPY N/A 2018    Procedure: COLONOSCOPY INTO CECUM AND NORMAL TI WITH HOT SNARE POLYPECTOMY;  Surgeon: Lincoln Chino MD;  Location: [facility];  Service: Gastroenterology    COLONOSCOPY N/A 2024    Procedure: COLONOSCOPY INTO CECUM AND TI WITH POLYPECTOMIES (COLD BX/COLD SNARE);  Surgeon: Lincoln Chino MD;  Location:  [facility];  Service: Gastroenterology;  Laterality: N/A;  PRE: HX OF POLYPS  POST: POLYPS, DIVERTICULOSIS, h emorrhoids    COSMETIC SURGERY  10/2016    Abdominoplasty    LASIK      VARICOSE VEIN SURGERY Left 2025    Left GSV Medial and Anterior  "Branch Ablation      FAMILY HISTORY:   Family History   Problem Relation Age of Onset    Meniere's disease Mother     Dementia Mother     COPD Mother     Migraines Sister     Suicidality Brother     Coronary artery disease Brother       SOCIAL HISTORY:   Social History     Tobacco Use    Smoking status: Never    Smokeless tobacco: Never   Vaping Use    Vaping status: Never Used   Substance Use Topics    Alcohol use: No     Comment: socially    Drug use: No      MEDICATIONS:   Current Outpatient Medications on File Prior to Visit   Medication Sig Dispense Refill    benzonatate (Tessalon Perles) 100 MG capsule Take 2 capsules by mouth 3 (Three) Times a Day As Needed for Cough. 30 capsule 0    finasteride (PROSCAR) 5 MG tablet       HYDROcodone-acetaminophen (Norco) 5-325 MG per tablet Take 1 tablet by mouth Every 6 (Six) Hours As Needed for Severe Pain. 20 tablet 0    LORazepam (Ativan) 1 MG tablet Take 1 tablet by mouth Take As Directed for 2 doses. Take 1 to 2 tablets 3 hours before procedure arrival. Take 1 to 2 tablets 1 hour before procedure arrival. 4 tablet 0    meclizine (ANTIVERT) 12.5 MG tablet Take 1 tablet by mouth 3 (Three) Times a Day As Needed for Dizziness. 30 tablet 0    methylPREDNISolone (MEDROL) 4 MG dose pack Take as directed on package instructions. 21 tablet 0    naproxen (Naprosyn) 500 MG tablet Take 1 tablet by mouth 2 (Two) Times a Day With Meals. 20 tablet 1    omeprazole (PriLOSEC) 20 MG capsule Take 1 capsule by mouth Daily As Needed.      triamterene-hydrochlorothiazide (MAXZIDE-25) 37.5-25 MG per tablet Take 1 tablet by mouth Daily. 30 tablet 2     No current facility-administered medications on file prior to visit.       ALLERGIES: Patient has no known allergies.       Objective   Vitals:    02/12/25 0825   BP: 136/78   Pulse: 82   Temp: 97.5 °F (36.4 °C)   TempSrc: Temporal   SpO2: 96%   Weight: 81.4 kg (179 lb 6.4 oz)   Height: 165.1 cm (65\")     Body mass index is 29.85 kg/m².  BMI " is >= 25 and <30. (Overweight) The following options were offered after discussion;: weight loss educational material (shared in after visit summary), exercise counseling/recommendations, and Information on healthy weight added to patient's after visit summary.      PHYSICAL EXAM:   Physical Exam   Right leg with minimal bruising no evidence of cellulitis or phlebitis  Result Review   LABS:    CBC          4/25/2024    10:51 10/22/2024    08:52   CBC   WBC 5.33  12.02    RBC 5.05  4.70    Hemoglobin 14.8  14.8    Hematocrit 44.8  42.7    MCV 88.7  90.9    MCH 29.3  31.5    MCHC 33.0  34.7    RDW 12.1  12.2    Platelets 238  272      BMP          4/25/2024    10:51 10/22/2024    08:52   BMP   BUN 22  16    Creatinine 0.93  0.86    Sodium 141  141    Potassium 3.8  3.9    Chloride 104  102    CO2 26.0  27.5    Calcium 9.3  9.3      Lipid Panel          10/22/2024    08:52   Lipid Panel   Total Cholesterol 214    Triglycerides 104    HDL Cholesterol 53    VLDL Cholesterol 19    LDL Cholesterol  142               Results Review:       I reviewed the patient's new clinical results.    The following radiologic or non-invasive studies have been reviewed by me: Right leg scan looks good with no evidence of DVT and good closure of the right greater saphenous  Duplex Venous Lower Extremity - Left CAR 01/29/2025    Interpretation Summary    Successful ablation left anterior and medial greater saphenous veins.  No DVT noted.     No radiology results for the last 30 days.                ASSESSMENT/PLAN:   Diagnoses and all orders for this visit:    1. History of varicose veins (Primary)    2. Varicose veins of both lower extremities with pain    3. Venous (peripheral) insufficiency       64 y.o. female with successful right leg ablation.  Plans for 3-month follow-up scan of both legs already made.  She looks great and we will continue to progress her way.  She may increase her activities with exercises a little bit but will be  conscious of the right leg.    I discussed the plan with the patient who is agreeable to the plan of care at this point. Thank you for this consult.   Follow Up  Return in about 3 months (around 5/12/2025).    Frank Ferrara MD   02/12/25

## 2025-02-21 ENCOUNTER — TELEPHONE (OUTPATIENT)
Dept: INTERNAL MEDICINE | Facility: CLINIC | Age: 65
End: 2025-02-21
Payer: COMMERCIAL

## 2025-02-21 NOTE — TELEPHONE ENCOUNTER
"Attempted to call patient regarding FMLA papers, LVM requesting call back    HUB TO READ  Your FMLA papers are ready. Should we send them to you or should we fax them?    FORMS ARE IN [facility]S \"HOLDING FOR PATIENT'S   FOLDER IN [place]  "

## 2025-02-21 NOTE — TELEPHONE ENCOUNTER
Name: DONNA MAURICIO      Relationship: PATIENT      Best Callback Number:       HUB PROVIDED THE RELAY MESSAGE FROM THE OFFICE      PATIENT: VOICED UNDERSTANDING AND HAS NO FURTHER QUESTIONS AT THIS TIME    ADDITIONAL INFORMATION:  SHE WANTS THEM FAXED TO [employer] THE PATIENT SAYS THE FAX NUMBER SHOULD BE ON THE FORM.

## 2025-04-23 ENCOUNTER — OFFICE VISIT (OUTPATIENT)
Dept: INTERNAL MEDICINE | Facility: CLINIC | Age: 65
End: 2025-04-23
Payer: COMMERCIAL

## 2025-04-23 VITALS
OXYGEN SATURATION: 99 % | HEART RATE: 69 BPM | HEIGHT: 65 IN | SYSTOLIC BLOOD PRESSURE: 128 MMHG | BODY MASS INDEX: 30.75 KG/M2 | TEMPERATURE: 97.9 F | DIASTOLIC BLOOD PRESSURE: 84 MMHG | WEIGHT: 184.6 LBS

## 2025-04-23 DIAGNOSIS — Z12.39 SCREENING BREAST EXAMINATION: ICD-10-CM

## 2025-04-23 DIAGNOSIS — H40.003 GLAUCOMA SUSPECT OF BOTH EYES: ICD-10-CM

## 2025-04-23 DIAGNOSIS — I10 PRIMARY HYPERTENSION: Primary | Chronic | ICD-10-CM

## 2025-04-23 DIAGNOSIS — K21.9 GASTROESOPHAGEAL REFLUX DISEASE WITHOUT ESOPHAGITIS: Chronic | ICD-10-CM

## 2025-04-23 PROCEDURE — 99214 OFFICE O/P EST MOD 30 MIN: CPT | Performed by: NURSE PRACTITIONER

## 2025-05-06 PROBLEM — H40.003 GLAUCOMA SUSPECT OF BOTH EYES: Chronic | Status: ACTIVE | Noted: 2025-05-06

## 2025-05-06 PROBLEM — H40.003 GLAUCOMA SUSPECT OF BOTH EYES: Status: ACTIVE | Noted: 2025-05-06

## 2025-05-06 PROBLEM — M85.80 OSTEOPENIA: Status: ACTIVE | Noted: 2025-05-06

## 2025-05-06 NOTE — PROGRESS NOTES
Chief Complaint  Hypertension (6 month follow up)  Subjective        Carlie Brennan presents to [facility]  Hypertension    History of Present Illness  The patient presents for evaluation of varicose veins, glaucoma suspect, sinus issues, weight management, and health maintenance.    Treatment for varicose veins was performed on 01/29/2025 and 02/12/2025, which was more painful than anticipated. The recovery process has been challenging, with persistent tenderness and slight discoloration. A follow-up appointment is scheduled with Dr. Ferrara on 05/07/2025 for an ultrasound.     Exercise has been resumed, but weight gain is reported due to prolonged periods of inactivity. Compression socks were prescribed but are worn intermittently due to discomfort. Lorazepam was taken for anxiety during the second procedure, which was found beneficial.    Elevated eye pressures have been noted, and she is considered a glaucoma suspect. An upcoming appointment with the ophthalmologist is scheduled in 2 months.    A viral illness was experienced in 01/2025, but no influenza or COVID-19 infections have occurred. Mucinex is taken for sinus issues, which have been more frequent recently. Increased pressure and drainage were noted last week but have since resolved.    Recurrent heartburn and indigestion are attributed to weight gain and poor diet. Bowel movements are regular, although not closely monitored since dietary changes.     Sleep is generally sufficient, but occasional insomnia or early waking occurs.     Triamterene is taken consistently on Mondays, Wednesdays, and Fridays. A history of ankle swelling is noted, which worsens without medication. Cramps occur with medication but are infrequent. Good hydration is maintained. Stomach cramps at bedtime are triggered by certain movements and managed through breathing exercises. No bowel movements follow the cramps.    Objective   Vital Signs:  /84  "(BP Location: Left arm, Patient Position: Sitting, Cuff Size: Adult)   Pulse 69   Temp 97.9 °F (36.6 °C)   Ht 165.1 cm (65\")   Wt 83.7 kg (184 lb 9.6 oz)   SpO2 99%   BMI 30.72 kg/m²   Estimated body mass index is 30.72 kg/m² as calculated from the following:    Height as of this encounter: 165.1 cm (65\").    Weight as of this encounter: 83.7 kg (184 lb 9.6 oz).            Physical Exam  Constitutional:       Appearance: She is well-developed. She is not ill-appearing.   HENT:      Head: Normocephalic.      Right Ear: Hearing, tympanic membrane and external ear normal.      Left Ear: Hearing, tympanic membrane and external ear normal.      Nose: Nose normal. No nasal deformity, mucosal edema or rhinorrhea.      Right Sinus: No maxillary sinus tenderness or frontal sinus tenderness.      Left Sinus: No maxillary sinus tenderness or frontal sinus tenderness.      Mouth/Throat:      Dentition: Normal dentition.   Eyes:      General: Lids are normal.         Right eye: No discharge.         Left eye: No discharge.      Conjunctiva/sclera: Conjunctivae normal.      Right eye: No exudate.     Left eye: No exudate.  Neck:      Thyroid: No thyroid mass or thyromegaly.      Vascular: No carotid bruit.      Trachea: Trachea normal.   Cardiovascular:      Rate and Rhythm: Regular rhythm.      Pulses: Normal pulses.      Heart sounds: Normal heart sounds. No murmur heard.  Pulmonary:      Effort: No respiratory distress.      Breath sounds: Normal breath sounds. No decreased breath sounds, wheezing, rhonchi or rales.   Abdominal:      General: Bowel sounds are normal.      Palpations: Abdomen is soft.      Tenderness: There is no abdominal tenderness.   Musculoskeletal:      Cervical back: Normal range of motion. No edema.   Lymphadenopathy:      Head:      Right side of head: No submental, submandibular, tonsillar, preauricular, posterior auricular or occipital adenopathy.      Left side of head: No submental, " submandibular, tonsillar, preauricular, posterior auricular or occipital adenopathy.   Skin:     General: Skin is warm and dry.      Nails: There is no clubbing.   Neurological:      Mental Status: She is alert.   Psychiatric:         Behavior: Behavior is cooperative.            Result Review :  The following data was reviewed by: RAY Mobley on 04/23/2025:  Common labs          10/22/2024    08:52   Common Labs   Glucose 86    BUN 16    Creatinine 0.86    Sodium 141    Potassium 3.9    Chloride 102    Calcium 9.3    Albumin 4.2    Total Bilirubin 0.2    Alkaline Phosphatase 81    AST (SGOT) 20    ALT (SGPT) 16    WBC 12.02    Hemoglobin 14.8    Hematocrit 42.7    Platelets 272    Total Cholesterol 214    Triglycerides 104    HDL Cholesterol 53    LDL Cholesterol  142      Data reviewed : Radiologic studies DEXA 8/2024      Results  Labs   - Magnesium levels: 10/2024, Normal   - Cholesterol levels: 10/2024, Slightly elevated   - Kidney function test: 10/2024, Normal   - Liver function test: 10/2024, Normal   - Blood sugar levels: 10/2024, Normal             Assessment and Plan   Diagnoses and all orders for this visit:    1. Primary hypertension (Primary)  Assessment & Plan:  - BP is stable in the office today.  - Triam-HCTZ is taking Mon-Wed-Fri daily.      2. Glaucoma suspect of both eyes  Assessment & Plan:  Glaucoma suspect.  - Eye pressures were elevated, and she is considered a glaucoma suspect.  - Ophthalmologist noted borderline glaucoma.  - Will follow up with her ophthalmologist in 2 months.  - Discussed the discomfort during eye pressure testing due to dry eyes.      3. Gastroesophageal reflux disease without esophagitis  Assessment & Plan:  - Notes increase in symptoms with recent weight gain.  - Currently managed on omeprazole, denies abdominal pain.  - RTC if sx do not improve with planned lifestyle changes/weight loss.      4. Screening breast examination  -     Mammo Screening Digital  Tomosynthesis Bilateral With CAD; Future      Assessment & Plan     Health maintenance.  - Last mammogram was in 10/2023, and last Pap smear was also in 10/2023.  - Both Pap smears and mammograms have been normal.  - Mammogram has been ordered and scheduling will contact her.  - Blood work conducted in 10/2024 showed normal magnesium, thyroid, kidney, liver function, and sugar levels; cholesterol was slightly elevated.           Follow Up   Return in about 6 months (around 10/23/2025) for Annual physical.  Patient was given instructions and counseling regarding her condition or for health maintenance advice. Please see specific information pulled into the AVS if appropriate.     Patient or patient representative verbalized consent for the use of Ambient Listening during the visit with  RAY Mobley for chart documentation. 5/6/2025  18:53 EDT

## 2025-05-06 NOTE — ASSESSMENT & PLAN NOTE
- Notes increase in symptoms with recent weight gain.  - Currently managed on omeprazole, denies abdominal pain.  - RTC if sx do not improve with planned lifestyle changes/weight loss.

## 2025-05-06 NOTE — ASSESSMENT & PLAN NOTE
Glaucoma suspect.  - Eye pressures were elevated, and she is considered a glaucoma suspect.  - Ophthalmologist noted borderline glaucoma.  - Will follow up with her ophthalmologist in 2 months.  - Discussed the discomfort during eye pressure testing due to dry eyes.

## 2025-05-07 ENCOUNTER — OFFICE VISIT (OUTPATIENT)
Age: 65
End: 2025-05-07
Payer: COMMERCIAL

## 2025-05-07 ENCOUNTER — HOSPITAL ENCOUNTER (OUTPATIENT)
Facility: HOSPITAL | Age: 65
Discharge: HOME | End: 2025-05-07
Admitting: SURGERY
Payer: COMMERCIAL

## 2025-05-07 VITALS — DIASTOLIC BLOOD PRESSURE: 78 MMHG | BODY MASS INDEX: 30.76 KG/M2 | HEIGHT: 65 IN | SYSTOLIC BLOOD PRESSURE: 130 MMHG

## 2025-05-07 DIAGNOSIS — I83.813 VARICOSE VEINS OF BOTH LOWER EXTREMITIES WITH PAIN: Chronic | ICD-10-CM

## 2025-05-07 DIAGNOSIS — I87.2 VENOUS (PERIPHERAL) INSUFFICIENCY: ICD-10-CM

## 2025-05-07 DIAGNOSIS — I83.813 VARICOSE VEINS OF BOTH LOWER EXTREMITIES WITH PAIN: Primary | Chronic | ICD-10-CM

## 2025-05-07 PROCEDURE — 93970 EXTREMITY STUDY: CPT

## 2025-05-07 NOTE — PROGRESS NOTES
Patient Name: Carlie Brennan    MRN: [id number] Encounter Date: 2025      Consulting Service: Vascular Surgery    Referring Provider: No ref. provider found       CHIEF COMPLAINT:  Chief Complaint   Patient presents with    Varicose Veins       Subjective    HPI: Carlie Brennan is a 64 y.o. female is being seen for evaluation/management of vein intervention.  Patient presents today for 3 month follow-up of  EVLA procedure bilaterally.  The patient's symptoms have improved status post procedure.  Current varicose veins have diminished in diameter.  Patient denies issues such as numbness, phlebitis or brown staining.  Based on current condition it appears the patient is stable for as needed follow-up.    PAST MEDICAL HISTORY:   Past Medical History:   Diagnosis Date    Colon polyp     Found during colonoscopy    GERD (gastroesophageal reflux disease)     Glaucoma     Pressure remains low but have it checked regularly.    History of varicose veins     BLE with pain    Hypertension     Iron deficiency anemia 2018    Localized edema     2023    Meniere's disease, unspecified ear     Pregnancy         Rosacea     2023    Vertigo       PAST SURGICAL HISTORY:   Past Surgical History:   Procedure Laterality Date    ABDOMINOPLASTY       SECTION      x 2    COLONOSCOPY N/A 2018    Procedure: COLONOSCOPY INTO CECUM AND NORMAL TI WITH HOT SNARE POLYPECTOMY;  Surgeon: Lincoln Chino MD;  Location:  [facility];  Service: Gastroenterology    COLONOSCOPY N/A 2024    Procedure: COLONOSCOPY INTO CECUM AND TI WITH POLYPECTOMIES (COLD BX/COLD SNARE);  Surgeon: Lincoln Chino MD;  Location:  [facility];  Service: Gastroenterology;  Laterality: N/A;  PRE: HX OF POLYPS  POST: POLYPS, DIVERTICULOSIS, h emorrhoids    COSMETIC SURGERY  10/2016    Abdominoplasty    LASIK      VARICOSE VEIN SURGERY Left 2025    Left GSV Medial and Anterior Branch Ablation     "  FAMILY HISTORY:   Family History   Problem Relation Age of Onset    Meniere's disease Mother     Dementia Mother     COPD Mother     Migraines Sister     Suicidality Brother     Coronary artery disease Brother       SOCIAL HISTORY:   Social History     Tobacco Use    Smoking status: Never     Passive exposure: Never    Smokeless tobacco: Never   Vaping Use    Vaping status: Never Used   Substance Use Topics    Alcohol use: No     Comment: socially    Drug use: No      MEDICATIONS:   Current Outpatient Medications on File Prior to Visit   Medication Sig Dispense Refill    finasteride (PROSCAR) 5 MG tablet       meclizine (ANTIVERT) 12.5 MG tablet Take 1 tablet by mouth 3 (Three) Times a Day As Needed for Dizziness. 30 tablet 0    omeprazole (PriLOSEC) 20 MG capsule Take 1 capsule by mouth Daily As Needed.      triamterene-hydrochlorothiazide (MAXZIDE-25) 37.5-25 MG per tablet Take 1 tablet by mouth Daily. 30 tablet 2    naproxen (Naprosyn) 500 MG tablet Take 1 tablet by mouth 2 (Two) Times a Day With Meals. (Patient not taking: Reported on 5/7/2025) 20 tablet 1     No current facility-administered medications on file prior to visit.       ALLERGIES: Patient has no known allergies.       Objective   Vitals:    05/07/25 1539   BP: 130/78   BP Location: Right arm   Height: 165 cm (64.96\")     Body mass index is 30.76 kg/m².          PHYSICAL EXAM:   Physical Exam  Constitutional:       Appearance: Normal appearance.   HENT:      Head: Normocephalic and atraumatic.      Nose: Nose normal.   Eyes:      Extraocular Movements: Extraocular movements intact.      Pupils: Pupils are equal, round, and reactive to light.   Cardiovascular:      Rate and Rhythm: Normal rate.      Pulses: Normal pulses.      Heart sounds: Normal heart sounds.   Pulmonary:      Effort: Pulmonary effort is normal.      Breath sounds: Normal breath sounds.   Abdominal:      General: Abdomen is flat. Bowel sounds are normal.      Palpations: Abdomen " is soft.   Musculoskeletal:         General: Normal range of motion.      Cervical back: Normal range of motion and neck supple.      Right lower le+ Edema present.      Left lower le+ Edema present.   Skin:     General: Skin is warm and dry.   Neurological:      General: No focal deficit present.      Mental Status: She is alert and oriented to person, place, and time. Mental status is at baseline.   Psychiatric:         Mood and Affect: Mood normal.         Thought Content: Thought content normal.          Result Review   LABS:    CBC          10/22/2024    08:52   CBC   WBC 12.02    RBC 4.70    Hemoglobin 14.8    Hematocrit 42.7    MCV 90.9    MCH 31.5    MCHC 34.7    RDW 12.2    Platelets 272      BMP          10/22/2024    08:52   BMP   BUN 16    Creatinine 0.86    Sodium 141    Potassium 3.9    Chloride 102    CO2 27.5    Calcium 9.3      Lipid Panel          10/22/2024    08:52   Lipid Panel   Total Cholesterol 214    Triglycerides 104    HDL Cholesterol 53    VLDL Cholesterol 19    LDL Cholesterol  142               Results Review:       I reviewed the patient's new clinical results.    The following radiologic or non-invasive studies have been reviewed by me: Normal extremity venous study negative for DVT with successful ablation left greater medial and anterior branch and right greater medial branch.  Duplex Venous Lower Extremity - Right CAR 2025    Interpretation Summary    Successful right greater saphenous ablation without DVT noted.     No radiology results for the last 30 days.                ASSESSMENT/PLAN:   Diagnoses and all orders for this visit:    1. Varicose veins of both lower extremities with pain (Primary)    2. Venous (peripheral) insufficiency       64 y.o. female with successful ablation of left and right saphenous systems with excellent clinical result so far she is having no real complaints and is no longer needed wearing her stockings she is aware that if something  starts to come back she is to give us a call otherwise we will see her now on a as needed basis make it    I discussed the plan with the patient who is agreeable to the plan of care at this point. Thank you for this consult.   Follow Up  Return if symptoms worsen or fail to improve.    Frank Ferrara MD   05/07/25

## 2025-05-08 LAB
BH CV LOW VAS LEFT EXTERNAL ILIAC AUGMENT: NORMAL
BH CV LOW VAS LEFT EXTERNAL ILIAC COMPRESS: NORMAL
BH CV LOW VAS LEFT GREATER SAPH AK VESSEL: 1
BH CV LOW VAS LEFT GREATER SAPH BK VESSEL: 1
BH CV LOW VAS RIGHT GREATER SAPH AK VESSEL: 1
BH CV LOW VAS RIGHT GREATER SAPH BK VESSEL: 1
BH CV LOWER VASCULAR LEFT ANTERIOR SAPH COMPRESS: NORMAL
BH CV LOWER VASCULAR LEFT ANTERIOR SAPH VESSEL SCRIPT: 1
BH CV LOWER VASCULAR LEFT COMMON FEMORAL AUGMENT: NORMAL
BH CV LOWER VASCULAR LEFT COMMON FEMORAL COMPETENT: NORMAL
BH CV LOWER VASCULAR LEFT COMMON FEMORAL COMPRESS: NORMAL
BH CV LOWER VASCULAR LEFT COMMON FEMORAL PHASIC: NORMAL
BH CV LOWER VASCULAR LEFT COMMON FEMORAL SPONT: NORMAL
BH CV LOWER VASCULAR LEFT DISTAL FEMORAL COMPRESS: NORMAL
BH CV LOWER VASCULAR LEFT EXTERNAL ILIAC COMPETENT: NORMAL
BH CV LOWER VASCULAR LEFT EXTERNAL ILIAC PHASIC: NORMAL
BH CV LOWER VASCULAR LEFT EXTERNAL ILIAC SPONT: NORMAL
BH CV LOWER VASCULAR LEFT GASTRONEMIUS COMPRESS: NORMAL
BH CV LOWER VASCULAR LEFT GREATER SAPH AK COMPRESS: NORMAL
BH CV LOWER VASCULAR LEFT GREATER SAPH BK COMPRESS: NORMAL
BH CV LOWER VASCULAR LEFT LESSER SAPH COMPRESS: NORMAL
BH CV LOWER VASCULAR LEFT MID FEMORAL AUGMENT: NORMAL
BH CV LOWER VASCULAR LEFT MID FEMORAL COMPETENT: NORMAL
BH CV LOWER VASCULAR LEFT MID FEMORAL COMPRESS: NORMAL
BH CV LOWER VASCULAR LEFT PERONEAL AUGMENT: NORMAL
BH CV LOWER VASCULAR LEFT PERONEAL COMPRESS: NORMAL
BH CV LOWER VASCULAR LEFT POPLITEAL AUGMENT: NORMAL
BH CV LOWER VASCULAR LEFT POPLITEAL COMPETENT: NORMAL
BH CV LOWER VASCULAR LEFT POPLITEAL COMPRESS: NORMAL
BH CV LOWER VASCULAR LEFT POSTERIOR TIBIAL AUGMENT: NORMAL
BH CV LOWER VASCULAR LEFT POSTERIOR TIBIAL COMPRESS: NORMAL
BH CV LOWER VASCULAR LEFT PROFUNDA FEMORAL COMPRESS: NORMAL
BH CV LOWER VASCULAR LEFT PROXIMAL FEMORAL COMPRESS: NORMAL
BH CV LOWER VASCULAR LEFT SAPHENOFEMORAL JUNCTION COMPRESS: NORMAL
BH CV LOWER VASCULAR LEFT SOLEAL COMPRESS: NORMAL
BH CV LOWER VASCULAR RIGHT COMMON FEMORAL AUGMENT: NORMAL
BH CV LOWER VASCULAR RIGHT COMMON FEMORAL COMPETENT: NORMAL
BH CV LOWER VASCULAR RIGHT COMMON FEMORAL COMPRESS: NORMAL
BH CV LOWER VASCULAR RIGHT COMMON FEMORAL PHASIC: NORMAL
BH CV LOWER VASCULAR RIGHT COMMON FEMORAL SPONT: NORMAL
BH CV LOWER VASCULAR RIGHT DISTAL FEMORAL COMPRESS: NORMAL
BH CV LOWER VASCULAR RIGHT EXTERNAL ILIAC AUGMENT: NORMAL
BH CV LOWER VASCULAR RIGHT EXTERNAL ILIAC COMPETENT: NORMAL
BH CV LOWER VASCULAR RIGHT EXTERNAL ILIAC COMPRESS: NORMAL
BH CV LOWER VASCULAR RIGHT EXTERNAL ILIAC PHASIC: NORMAL
BH CV LOWER VASCULAR RIGHT EXTERNAL ILIAC SPONT: NORMAL
BH CV LOWER VASCULAR RIGHT GASTRONEMIUS COMPRESS: NORMAL
BH CV LOWER VASCULAR RIGHT GREATER SAPH AK COMPRESS: NORMAL
BH CV LOWER VASCULAR RIGHT GREATER SAPH BK COMPRESS: NORMAL
BH CV LOWER VASCULAR RIGHT LESSER SAPH COMPRESS: NORMAL
BH CV LOWER VASCULAR RIGHT MID FEMORAL AUGMENT: NORMAL
BH CV LOWER VASCULAR RIGHT MID FEMORAL COMPETENT: NORMAL
BH CV LOWER VASCULAR RIGHT MID FEMORAL COMPRESS: NORMAL
BH CV LOWER VASCULAR RIGHT PERONEAL AUGMENT: NORMAL
BH CV LOWER VASCULAR RIGHT PERONEAL COMPRESS: NORMAL
BH CV LOWER VASCULAR RIGHT POPLITEAL AUGMENT: NORMAL
BH CV LOWER VASCULAR RIGHT POPLITEAL COMPETENT: NORMAL
BH CV LOWER VASCULAR RIGHT POPLITEAL COMPRESS: NORMAL
BH CV LOWER VASCULAR RIGHT POSTERIOR TIBIAL AUGMENT: NORMAL
BH CV LOWER VASCULAR RIGHT POSTERIOR TIBIAL COMPRESS: NORMAL
BH CV LOWER VASCULAR RIGHT PROFUNDA FEMORAL COMPRESS: NORMAL
BH CV LOWER VASCULAR RIGHT PROXIMAL FEMORAL COMPRESS: NORMAL
BH CV LOWER VASCULAR RIGHT SAPHENOFEMORAL JUNCTION COMPRESS: NORMAL
BH CV LOWER VASCULAR RIGHT SOLEAL COMPRESS: NORMAL

## (undated) DEVICE — SNAR POLYP CAPTIVATOR RND STFF 2.4 240CM 10MM 1P/U

## (undated) DEVICE — TUBING, SUCTION, 1/4" X 10', STRAIGHT: Brand: MEDLINE

## (undated) DEVICE — THE TORRENT IRRIGATION SCOPE CONNECTOR IS USED WITH THE TORRENT IRRIGATION TUBING TO PROVIDE IRRIGATION FLUIDS SUCH AS STERILE WATER DURING GASTROINTESTINAL ENDOSCOPIC PROCEDURES WHEN USED IN CONJUNCTION WITH AN IRRIGATION PUMP (OR ELECTROSURGICAL UNIT).: Brand: TORRENT

## (undated) DEVICE — CANN NASL CO2 TRULINK W/O2 A/

## (undated) DEVICE — THE SINGLE USE ETRAP – POLYP TRAP IS USED FOR SUCTION RETRIEVAL OF ENDOSCOPICALLY REMOVED POLYPS.: Brand: ETRAP

## (undated) DEVICE — LN SMPL CO2 SHTRM SD STREAM W/M LUER

## (undated) DEVICE — SENSR O2 OXIMAX FNGR A/ 18IN NONSTR

## (undated) DEVICE — SNAR POLYP SENSATION STDOVL 27 240 BX40

## (undated) DEVICE — KT ORCA ORCAPOD DISP STRL

## (undated) DEVICE — CANN O2 ETCO2 FITS ALL CONN CO2 SMPL A/ 7IN DISP LF

## (undated) DEVICE — SINGLE-USE BIOPSY FORCEPS: Brand: RADIAL JAW 4

## (undated) DEVICE — ADAPT CLN BIOGUARD AIR/H2O DISP

## (undated) DEVICE — Device: Brand: DEFENDO AIR/WATER/SUCTION AND BIOPSY VALVE